# Patient Record
Sex: MALE | Race: WHITE
[De-identification: names, ages, dates, MRNs, and addresses within clinical notes are randomized per-mention and may not be internally consistent; named-entity substitution may affect disease eponyms.]

---

## 2019-04-18 ENCOUNTER — HOSPITAL ENCOUNTER (OUTPATIENT)
Dept: HOSPITAL 75 - LAB FS | Age: 69
End: 2019-04-18
Attending: PEDIATRICS
Payer: MEDICARE

## 2019-04-18 DIAGNOSIS — E87.5: Primary | ICD-10-CM

## 2019-04-18 LAB
ALBUMIN SERPL-MCNC: 4.2 GM/DL (ref 3.2–4.5)
ALP SERPL-CCNC: 49 U/L (ref 40–136)
ALT SERPL-CCNC: 59 U/L (ref 0–55)
BILIRUB SERPL-MCNC: 0.5 MG/DL (ref 0.1–1)
BUN/CREAT SERPL: 19
CALCIUM SERPL-MCNC: 9.2 MG/DL (ref 8.5–10.1)
CHLORIDE SERPL-SCNC: 102 MMOL/L (ref 98–107)
CO2 SERPL-SCNC: 27 MMOL/L (ref 21–32)
CREAT SERPL-MCNC: 1.11 MG/DL (ref 0.6–1.3)
GFR SERPLBLD BASED ON 1.73 SQ M-ARVRAT: > 60 ML/MIN
GLUCOSE SERPL-MCNC: 105 MG/DL (ref 70–105)
POTASSIUM SERPL-SCNC: 5 MMOL/L (ref 3.6–5)
PROT SERPL-MCNC: 7 GM/DL (ref 6.4–8.2)
SODIUM SERPL-SCNC: 141 MMOL/L (ref 135–145)

## 2019-04-18 PROCEDURE — 36415 COLL VENOUS BLD VENIPUNCTURE: CPT

## 2019-04-18 PROCEDURE — 80053 COMPREHEN METABOLIC PANEL: CPT

## 2021-11-17 ENCOUNTER — HOSPITAL ENCOUNTER (EMERGENCY)
Dept: HOSPITAL 75 - ER FS | Age: 71
Discharge: ANOTHER HOSPITAL | End: 2021-11-17
Payer: MEDICARE

## 2021-11-17 VITALS — DIASTOLIC BLOOD PRESSURE: 65 MMHG | SYSTOLIC BLOOD PRESSURE: 98 MMHG

## 2021-11-17 DIAGNOSIS — J44.9: ICD-10-CM

## 2021-11-17 DIAGNOSIS — I62.02: Primary | ICD-10-CM

## 2021-11-17 DIAGNOSIS — E87.2: ICD-10-CM

## 2021-11-17 DIAGNOSIS — R41.0: ICD-10-CM

## 2021-11-17 DIAGNOSIS — D64.9: ICD-10-CM

## 2021-11-17 DIAGNOSIS — R00.0: ICD-10-CM

## 2021-11-17 DIAGNOSIS — E86.0: ICD-10-CM

## 2021-11-17 DIAGNOSIS — N17.9: ICD-10-CM

## 2021-11-17 LAB
%HYPO/RBC NFR BLD AUTO: (no result) %
ALBUMIN SERPL-MCNC: 4 GM/DL (ref 3.2–4.5)
ALP SERPL-CCNC: 68 U/L (ref 40–136)
ALT SERPL-CCNC: 70 U/L (ref 0–55)
ANISOCYTOSIS BLD QL SMEAR: (no result)
APTT BLD: 29 SEC (ref 24–35)
APTT PPP: YELLOW S
ARTERIAL PATENCY WRIST A: (no result)
BACTERIA #/AREA URNS HPF: (no result) /HPF
BARBITURATES UR QL: NEGATIVE
BASE EXCESS STD BLDA CALC-SCNC: 2.6 MMOL/L (ref -2.5–2.5)
BASOPHILS # BLD AUTO: 0 10^3/UL (ref 0–0.1)
BASOPHILS NFR BLD AUTO: 0 % (ref 0–10)
BASOPHILS NFR BLD MANUAL: 1 %
BDY SITE: (no result)
BENZODIAZ UR QL SCN: NEGATIVE
BILIRUB SERPL-MCNC: 0.7 MG/DL (ref 0.1–1)
BILIRUB UR QL STRIP: NEGATIVE
BODY TEMPERATURE: 36.8
BUN/CREAT SERPL: 29
CALCIUM SERPL-MCNC: 9.3 MG/DL (ref 8.5–10.1)
CHLORIDE SERPL-SCNC: 107 MMOL/L (ref 98–107)
CO2 BLDA CALC-SCNC: 33.3 MMOL/L (ref 21–31)
CO2 SERPL-SCNC: 26 MMOL/L (ref 21–32)
COCAINE UR QL: NEGATIVE
CREAT SERPL-MCNC: 1.57 MG/DL (ref 0.6–1.3)
EOSINOPHIL # BLD AUTO: 0.1 10^3/UL (ref 0–0.3)
EOSINOPHIL NFR BLD AUTO: 1 % (ref 0–10)
EOSINOPHIL NFR BLD MANUAL: 0 %
FIBRINOGEN PPP-MCNC: CLEAR MG/DL
GFR SERPLBLD BASED ON 1.73 SQ M-ARVRAT: 44 ML/MIN
GLUCOSE SERPL-MCNC: 110 MG/DL (ref 70–105)
GLUCOSE UR STRIP-MCNC: NEGATIVE MG/DL
HCT VFR BLD CALC: 26 % (ref 40–54)
HGB BLD-MCNC: 6.7 G/DL (ref 13.3–17.7)
HYALINE CASTS #/AREA URNS LPF: (no result) /LPF
INHALED O2 FLOW RATE: (no result) L/MIN
INR PPP: 1.2 (ref 0.8–1.4)
KETONES UR QL STRIP: (no result)
LEUKOCYTE ESTERASE UR QL STRIP: NEGATIVE
LYMPHOCYTES # BLD AUTO: 0.5 X 10^3 (ref 1–4)
LYMPHOCYTES NFR BLD AUTO: 5 % (ref 12–44)
MANUAL DIFFERENTIAL PERFORMED BLD QL: YES
MCH RBC QN AUTO: 22 PG (ref 25–34)
MCHC RBC AUTO-ENTMCNC: 26 G/DL (ref 32–36)
MCV RBC AUTO: 85 FL (ref 80–99)
METHADONE UR QL SCN: NEGATIVE
METHAMPHETAMINE SCREEN URINE S: NEGATIVE
MONOCYTES # BLD AUTO: 1.1 X 10^3 (ref 0–1)
MONOCYTES NFR BLD AUTO: 11 % (ref 0–12)
MONOCYTES NFR BLD: 2 %
NEUTROPHILS # BLD AUTO: 8.1 X 10^3 (ref 1.8–7.8)
NEUTROPHILS NFR BLD AUTO: 82 % (ref 42–75)
NEUTS BAND NFR BLD MANUAL: 91 %
NEUTS BAND NFR BLD: 0 %
NITRITE UR QL STRIP: NEGATIVE
OPIATES UR QL SCN: NEGATIVE
OXYCODONE UR QL: NEGATIVE
PCO2 BLDA: 68 MMHG (ref 35–45)
PH BLDA: 7.27 [PH] (ref 7.37–7.43)
PH UR STRIP: 5.5 [PH] (ref 5–9)
PLATELET # BLD: 434 10^3/UL (ref 130–400)
PMV BLD AUTO: 11.9 FL (ref 9–12.2)
PO2 BLDA: 85 MMHG (ref 79–93)
POTASSIUM SERPL-SCNC: 4.8 MMOL/L (ref 3.6–5)
PROPOXYPH UR QL: NEGATIVE
PROT SERPL-MCNC: 7 GM/DL (ref 6.4–8.2)
PROT UR QL STRIP: (no result)
PROTHROMBIN TIME: 15.5 SEC (ref 12.2–14.7)
RBC #/AREA URNS HPF: (no result) /HPF
SAO2 % BLDA FROM PO2: 95 % (ref 94–100)
SODIUM SERPL-SCNC: 145 MMOL/L (ref 135–145)
SP GR UR STRIP: 1.02 (ref 1.02–1.02)
SQUAMOUS #/AREA URNS HPF: (no result) /HPF
TRICYCLICS UR QL SCN: NEGATIVE
VARIANT LYMPHS NFR BLD MANUAL: 6 %
VENTILATION MODE VENT: NO
WBC # BLD AUTO: 9.9 10^3/UL (ref 4.3–11)
WBC #/AREA URNS HPF: (no result) /HPF

## 2021-11-17 PROCEDURE — 85610 PROTHROMBIN TIME: CPT

## 2021-11-17 PROCEDURE — 86141 C-REACTIVE PROTEIN HS: CPT

## 2021-11-17 PROCEDURE — 83880 ASSAY OF NATRIURETIC PEPTIDE: CPT

## 2021-11-17 PROCEDURE — 83605 ASSAY OF LACTIC ACID: CPT

## 2021-11-17 PROCEDURE — 87040 BLOOD CULTURE FOR BACTERIA: CPT

## 2021-11-17 PROCEDURE — 80053 COMPREHEN METABOLIC PANEL: CPT

## 2021-11-17 PROCEDURE — 84484 ASSAY OF TROPONIN QUANT: CPT

## 2021-11-17 PROCEDURE — 93041 RHYTHM ECG TRACING: CPT

## 2021-11-17 PROCEDURE — 82805 BLOOD GASES W/O2 SATURATION: CPT

## 2021-11-17 PROCEDURE — 36415 COLL VENOUS BLD VENIPUNCTURE: CPT

## 2021-11-17 PROCEDURE — 80320 DRUG SCREEN QUANTALCOHOLS: CPT

## 2021-11-17 PROCEDURE — 71045 X-RAY EXAM CHEST 1 VIEW: CPT

## 2021-11-17 PROCEDURE — 70450 CT HEAD/BRAIN W/O DYE: CPT

## 2021-11-17 PROCEDURE — 80306 DRUG TEST PRSMV INSTRMNT: CPT

## 2021-11-17 PROCEDURE — 99291 CRITICAL CARE FIRST HOUR: CPT

## 2021-11-17 PROCEDURE — 85730 THROMBOPLASTIN TIME PARTIAL: CPT

## 2021-11-17 PROCEDURE — 93005 ELECTROCARDIOGRAM TRACING: CPT

## 2021-11-17 PROCEDURE — 82274 ASSAY TEST FOR BLOOD FECAL: CPT

## 2021-11-17 PROCEDURE — 85027 COMPLETE CBC AUTOMATED: CPT

## 2021-11-17 PROCEDURE — 81000 URINALYSIS NONAUTO W/SCOPE: CPT

## 2021-11-17 PROCEDURE — 85007 BL SMEAR W/DIFF WBC COUNT: CPT

## 2021-11-17 NOTE — DIAGNOSTIC IMAGING REPORT
INDICATION: Confusion, altered mental status, shortness of

breath.



COMPARISON: None.



FINDINGS:  shunt line is seen on the right. Lungs are clear.

The heart is normal. There is no pneumothorax. Osseous structures

are stable.



IMPRESSION: Negative chest.



Dictated by: 



  Dictated on workstation # [id number]

## 2021-11-17 NOTE — DIAGNOSTIC IMAGING REPORT
PROCEDURE: CT head without contrast.



TECHNIQUE: Multiple contiguous axial images were obtained through

the brain without the use of intravenous contrast. Auto Exposure

Controls were utilized during the CT exam to meet ALARA standards

for radiation dose reduction. 



INDICATION: Altered mental status, confusion.



COMPARISON: None available.



FINDINGS: A lead/catheter is identified extending into the right

frontal lobe through the right frontal bone. This is associated

with hypodensity along the course of this lead. This does not

extend into the ventricular system. An additional shunt catheter

is present entering via a posterior right parietal approach with

the distal tip extending to midline within the ventricular

system. A small amount of slightly hyperdense fluid is noted

within the extra-axial location overlying the left cerebral

hemisphere. This measures up to 4 mm in maximal thickness. There

is no significant midline shift. No uncal herniation. Focal

hypodensity is identified involving the posterior right frontal

and anterior right parietotemporal lobes adjacent to the sylvian

fissure. This appears to be associated with the cortex and

underlying white matter. Prominence of the ventricular system is

noted. Aneurysm coils are identified near the expected location

of the tip of the basilar artery. Scattered vascular

calcifications are present. The paranasal sinuses are clear.

Besides postsurgical changes, the calvarium and extracalvarial

soft tissues are unremarkable.



IMPRESSION: Minimal extra-axial slight hyperdensities overlying

the left cerebellar hemisphere concerning for a tiny

chronic-to-subacute subdural hematoma without mass effect.



Focal hypodensities adjacent to the right sylvian fissure. Though

indeterminate, this could relate to a recent infarction. Further

evaluation with MRI of the brain would help to further evaluate.



Ventricular shunt catheters are in place. Mild prominence of the

ventricular system is identified. Comparison to prior imaging

would be recommended to evaluate for change or developing

hydrocephalus.



Additional chronic and postsurgical changes as described above.



Findings discussed with Dr. Elizabeth at 1623 hours on November 17, 2021.



Dictated by: 



  Dictated on workstation # [id number]

## 2021-11-17 NOTE — ED GENERAL
General


Stated Complaint:  AMS


Source of Information:  Patient, EMS


Exam Limitations:  Other (slow to answer questions and somnolent)





History of Present Illness


Date Seen by Provider:  2021


Time Seen by Provider:  15:10


Initial Comments


71 year old male presenting by EMS with altered mental status. He has a history 

of COPD and is to be wearing oxygen but was found laying on the floor without 

any oxygen tubing on him.  There was no evidence of acute fall or injury.  He 

had been found by a neighbor when they went to check on him.  When EMS put him 

back on his oxygen transported the said that he did become more alert.  

According to his family he has had a previous brain aneurysm and had some mental

difficulties since then.  That was over 15 years ago when [facility] [place] was 

going by [facility].  He does live on his own and cares for himself but

his family does have DPOA.  He is answering questions as to his name and place 

as being in the hospital but is not oriented to time.  He cannot answer any 

questions about why he was here or what was going on for him today.  Family 

reports that last Thursday they had called 911 to try and get him transported to

be evaluated because he was having slurred speech and confusion and thought that

he was having a stroke at that time.  However when EMS arrived he had refused 

transport.


Associated Systoms:  No Chest Pain (denies chest pains), No Cough, No 

Fever/Chills, No Headaches (denies headache), No Nausea/Vomiting; Shortness of 

Air (chronic with COPD)





Allergies and Home Medications


Allergies


Coded Allergies:  


     No Known Drug Allergies (Unverified , 21)





Patient Home Medication List


Home Medication List Reviewed:  Yes





Review of Systems


Review of Systems


Constitutional:  No chills, No fever


Unable to obtain full ROS due to patient being somnolent and not answering 

questions.





Past Medical-Social-Family Hx


Past Medical History


Surgery/Hospitalization HX:  


Brain aneurysm treated at [place],  shunt, COPD that is oxygen dependent,


Hepatitis C





Physical Exam


Vital Signs





Vital Signs - First Documented








 21





 15:10 15:15


 


Temp 36.0 


 


Pulse 115 


 


Resp 23 


 


B/P (MAP) 126/67 (86) 


 


Pulse Ox 90 


 


O2 Delivery Nasal Cannula 


 


O2 Flow Rate  2.00





Capillary Refill :


Height, Weight, BMI


Height: '"


Weight: lbs. oz. kg;  BMI


Method:


General Appearance:  No Apparent Distress, Chronically ill, Other (somnolent and

slow to answer questions. oriented to person and place as hospital)


HEENT:  PERRL/EOMI; No Pharynx Normal (dry mucous membranes)


Neck:  Full Range of Motion, Supple


Respiratory:  Chest Non Tender, Lungs Clear, No Accessory Muscle Use, No 

Respiratory Distress, Decreased Breath Sounds; No Stridor


Cardiovascular:  Normal Peripheral Pulses, Tachycardia


Gastrointestinal:  Normal Bowel Sounds, No Pulsatile Mass, Non Tender, Soft


Rectal:  Heme Negative Stool


Extremity:  Normal Capillary Refill, Normal Range of Motion, Pedal Edema (1+ 

pitting edema BLE)


Neurologic/Psychiatric:  No Alert (somnolent but awakens to voice), No Oriented 

x3 (oriented to self and place); No Motor/Sensory Deficits, Other (moving all 

extremities without any evidence of focal deficit. he was not following commands

consistently enough to obtain a NIHSS.)


Skin:  Warm/Dry, Ecchymosis (several bruises in various stages of healing), 

Pallor





Focused Exam


Sepsis Stage:  Ruled Out


Reason for ruling out sepsis:  No source for infection


Lactate Level


21 15:30: Lactic Acid Level 4.12*H


21 18:40: Lactic Acid Level 1.41





Time of Focused Exam:  18:30


Respiratory:  Chest Non Tender, Lungs Clear, No Accessory Muscle Use, No 

Respiratory Distress, Decreased Breath Sounds


Cardiovascular:  Normal Peripheral Pulses


Capillary Refill:  Less Than 3 Seconds


Peripheral Pulses:  2+ Radial Pulses (R), 2+ Radial Pulses (L)


Skin:  warm/dry, pallor


Lactic Acid Level





Laboratory Tests








Test


 21


15:30 21


18:40


 


Lactic Acid Level


 4.12 MMOL/L


(0.50-2.00)  *H 1.41 MMOL/L


(0.50-2.00)








Within 3hrs of presentation:  Admin fluids, Blood cultures prior to ABX's, Focus

exam, Lactate level





Progress/Results/Core Measures


Suspected Sepsis


Recent Fever Within 48 Hours:  No


New/Unexplained  Altered Menta:  Yes


Within 3hrs of presentation:  Admin fluids, Blood cultures prior to ABX's, Focus

exam


SIRS


Temperature: 


Pulse:  


Respiratory Rate: 


 


Laboratory Tests


21 15:30: White Blood Count 9.9


Blood Pressure  / 


Mean: 


 





21 15:30: Lactic Acid Level 4.12*H


21 18:40: Lactic Acid Level 1.41


Laboratory Tests


21 15:30: 


Creatinine 1.57H, INR Comment 1.2, Platelet Count 434H, Total Bilirubin 0.7








Results/Orders


Lab Results





Laboratory Tests








Test


 21


15:30 21


15:42 21


18:40 21


19:20 Range/Units


 


 


White Blood Count


 9.9 


 


 


 


 4.3-11.0


10^3/uL


 


Red Blood Count


 3.06 L


 


 


 


 4.30-5.52


10^6/uL


 


Hemoglobin 6.7 *L    13.3-17.7  g/dL


 


Hematocrit 26 L    40-54  %


 


Mean Corpuscular Volume 85     80-99  fL


 


Mean Corpuscular Hemoglobin 22 L    25-34  pg


 


Mean Corpuscular Hemoglobin


Concent 26 L


 


 


 


 32-36  g/dL





 


Red Cell Distribution Width 21.2 H    10.0-14.5  %


 


Platelet Count


 434 H


 


 


 


 130-400


10^3/uL


 


Mean Platelet Volume 11.9     9.0-12.2  fL


 


Immature Granulocyte % (Auto) 1      %


 


Neutrophils (%) (Auto) 82 H    42-75  %


 


Lymphocytes (%) (Auto) 5 L    12-44  %


 


Monocytes (%) (Auto) 11     0-12  %


 


Eosinophils (%) (Auto) 1     0-10  %


 


Basophils (%) (Auto) 0     0-10  %


 


Neutrophils # (Auto) 8.1 H    1.8-7.8  X 10^3


 


Lymphocytes # (Auto) 0.5 L    1.0-4.0  X 10^3


 


Monocytes # (Auto) 1.1 H    0.0-1.0  X 10^3


 


Eosinophils # (Auto)


 0.1 


 


 


 


 0.0-0.3


10^3/uL


 


Basophils # (Auto)


 0.0 


 


 


 


 0.0-0.1


10^3/uL


 


Immature Granulocyte # (Auto)


 0.1 


 


 


 


 0.0-0.1


10^3/uL


 


Neutrophils % (Manual) 91      %


 


Lymphocytes % (Manual) 6      %


 


Monocytes % (Manual) 2      %


 


Eosinophils % (Manual) 0      %


 


Basophils % (Manual) 1      %


 


Band Neutrophils 0      %


 


Hypochromasia MODERATE      


 


Anisocytosis MODERATE      


 


Prothrombin Time 15.5 H    12.2-14.7  SEC


 


INR Comment 1.2     0.8-1.4  


 


Activated Partial


Thromboplast Time 29 


 


 


 


 24-35  SEC





 


Sodium Level 145     135-145  MMOL/L


 


Potassium Level 4.8     3.6-5.0  MMOL/L


 


Chloride Level 107       MMOL/L


 


Carbon Dioxide Level 26     21-32  MMOL/L


 


Anion Gap 12     5-14  MMOL/L


 


Blood Urea Nitrogen 46 H    7-18  MG/DL


 


Creatinine


 1.57 H


 


 


 


 0.60-1.30


MG/DL


 


Estimat Glomerular Filtration


Rate 44 


 


 


 


  





 


BUN/Creatinine Ratio 29      


 


Glucose Level 110 H      MG/DL


 


Lactic Acid Level


 4.12 *H


 


 1.41 


 


 0.50-2.00


MMOL/L


 


Calcium Level 9.3     8.5-10.1  MG/DL


 


Corrected Calcium 9.3     8.5-10.1  MG/DL


 


Total Bilirubin 0.7     0.1-1.0  MG/DL


 


Aspartate Amino Transf


(AST/SGOT) 117 H


 


 


 


 5-34  U/L





 


Alanine Aminotransferase


(ALT/SGPT) 70 H


 


 


 


 0-55  U/L





 


Alkaline Phosphatase 68       U/L


 


Troponin I < 0.30     <0.30  NG/ML


 


C-Reactive Protein < 0.30     <0.50  MG/DL


 


Pro-B-Type Natriuretic Peptide 3700.0 H    <75.0  PG/ML


 


Total Protein 7.0     6.4-8.2  GM/DL


 


Albumin 4.0     3.2-4.5  GM/DL


 


Serum Alcohol < 10     <10  MG/DL


 


Urine Color  YELLOW     


 


Urine Clarity  CLEAR     


 


Urine pH  5.5    5-9  


 


Urine Specific Gravity  1.025 H   1.016-1.022  


 


Urine Protein  TRACE H   NEGATIVE  


 


Urine Glucose (UA)  NEGATIVE    NEGATIVE  


 


Urine Ketones  TRACE H   NEGATIVE  


 


Urine Nitrite  NEGATIVE    NEGATIVE  


 


Urine Bilirubin  NEGATIVE    NEGATIVE  


 


Urine Urobilinogen  0.2    < = 1.0  MG/DL


 


Urine Leukocyte Esterase  NEGATIVE    NEGATIVE  


 


Urine RBC (Auto)  NEGATIVE    NEGATIVE  


 


Urine RBC  NONE     /HPF


 


Urine WBC  2-5     /HPF


 


Urine Squamous Epithelial


Cells 


 NONE 


 


 


  /HPF





 


Urine Crystals  NONE     /LPF


 


Urine Bacteria  TRACE     /HPF


 


Urine Casts  PRESENT     /LPF


 


Urine Hyaline Casts  2-5 H    /LPF


 


Urine Mucus  NEGATIVE     /LPF


 


Urine Culture Indicated  NO     


 


Urine Opiates Screen  NEGATIVE    NEGATIVE  


 


Urine Oxycodone Screen  NEGATIVE    NEGATIVE  


 


Urine Methadone Screen  NEGATIVE    NEGATIVE  


 


Urine Propoxyphene Screen  NEGATIVE    NEGATIVE  


 


Urine Barbiturates Screen  NEGATIVE    NEGATIVE  


 


Ur Tricyclic Antidepressants


Screen 


 NEGATIVE 


 


 


 NEGATIVE  





 


Urine Phencyclidine Screen  NEGATIVE    NEGATIVE  


 


Urine Amphetamines Screen  NEGATIVE    NEGATIVE  


 


Urine Methamphetamines Screen  NEGATIVE    NEGATIVE  


 


Urine Benzodiazepines Screen  NEGATIVE    NEGATIVE  


 


Urine Cocaine Screen  NEGATIVE    NEGATIVE  


 


Urine Cannabinoids Screen  NEGATIVE    NEGATIVE  


 


Blood Gas Puncture Site    LT RADIAL   


 


Blood Gas Patient Temperature    36.8   


 


Arterial Blood pH    7.27 *L 7.37-7.43  


 


Arterial Blood Partial


Pressure CO2 


 


 


 68 H


 35-45  MMHG





 


Arterial Blood Partial


Pressure O2 


 


 


 85 


 79-93  MMHG





 


Arterial Blood HCO3    31 H 23-27  MMOL/L


 


Arterial Blood Total CO2


 


 


 


 33.3 H


 21.0-31.0


MMOL/L


 


Arterial Blood Oxygen


Saturation 


 


 


 95 


   %





 


Arterial Blood Base Excess


 


 


 


 2.6 H


 -2.5-2.5


MMOL/L


 


Omkar Test    OK   


 


Blood Gas Ventilator Setting    NO   


 


Blood Gas Inspired Oxygen    2 LITERS   








My Orders





Orders - EDMUND FORD MD


Monitor-Rhythm Ecg Trace Only (21 15:21)


Ed Iv/Invasive Line Start (21 15:21)


Cbc With Automated Diff (21 15:21)


Comprehensive Metabolic Panel (21 15:21)


Crp Fs (21 15:21)


Troponin I Fs (21 15:21)


Protime With Inr (21 15:)


Partial Thromboplastin Time (21 15:21)


Ekg Tracing (21 15:21)


O2 (21 15:21)


Blood Culture (21 15:21)


Probnp Fs (21 15:21)


Lactic Acid Analyzer (21 15:21)


Drug Screen Stat (Urine) (21 15:21)


Alcohol (21 15:21)


Ua Culture If Indicated (21 15:21)


Chest 1 View Ap/Pa Only (21 15:21)


Ct Head Wo (21 15:21)


Manual Differential (21 15:30)


Fecal Occult Bedside (21 16:00)


Ns Iv 1000 Ml (Sodium Chloride 0.9%) (21 17:14)


Ns Iv 1000 Ml (Sodium Chloride 0.9%) (21 19:11)


Arterial Blood Gas (21 19:20)





Vital Signs/I&O











 11/17/21 11/17/21





 15:10 15:15


 


Temp 36.0 


 


Pulse 115 


 


Resp 23 


 


B/P (MAP) 126/67 (86) 


 


Pulse Ox 90 92


 


O2 Delivery Nasal Cannula Nasal Cannula


 


O2 Flow Rate  2.00





Capillary Refill :


Progress Note #1:  


Progress Note


With his decreased mental status will obtain CT head to look for stroke or 

bleeding. ECG to look for acute MI or arrhythmia. Labs to check for anemia, 

sepsis, electrolyte imbalance, renal failure, hepatic failure, heart failure, 

coagulopathy. Try to get ABG since pt has history of COPD and is not compliant 

with oxygen and treatment.


Progress Note #2:  


   Time:  16:00


Progress Note


Hemoglobin came back at 6.7 so Hemoccult at bedside was done.  The bedside 

Hemoccult was negative.  His chemistry was still pending.


Progress Note #3:  


   Time:  16:23


Progress Note


Call received by radiology about CT scan of the head showing subacute to chronic

subdural hematoma, with findings for hypodensity along the right sylvian 

fissure, that might indicate a recent infarct.  shunt in place with dilated 

ventricles but no old study for comparison so unable to state if ventricles were

more dilated than usual or not. Findings for previous clipping or coiling 

consistent with hx from family about 2006 aneurysm. 





Chemistry came back showing elevated lactic acid of 4.12 and elevated BUN of 46 

with creatinine of 1.57.  His other electrolytes were all stable.  He did have 

negative troponin of less than 0.3. Will give a Liter of NS to help with sinus 

tachycardia from his ECG and telemetry monitoring as well as dehydration and 

elevated Lactic acid. Castellano catheter was placed to obtain UA and monitor urine 

output. Despite multiple attempts pt was moving too much and ABG was not able to

be obtained. CXR clear of infiltrate or effusion. 





When updated patient and family, the family was requesting redo what ever was 

felt to be medically indicated to help him.  Patient was not making any comments

1 way or the other about admission or transfer.  Family was requesting patient 

try to go to [place] since he initially had his brain aneurysm treated in  at

[facility] which is now [facility].  They were also okay with going to [facility] which 

is also in [place].





 call placed to [facility] One call and reached LISETH Moon.  She stated that [facility] was at capacity and that [facility] was at capacity with 36 admit 

holds in the ED.  She stated that if we called back overnight to order in 24 

hours they may have a change in bed status but currently they were at capacity.





1737 call placed to [facility] in [place] and spoke with LISETH Plascencia.  He stated that 

they were on MedSurg diversion.  They did have me speak with Dr. Coronel from 

Neurosurgery in case she thought the patient could go to ICU type bed at 

[facility]. After discussing the case with her, she felt he would be better served 

to go to a facility that also had vascular surgery with his history of aneurysm 

and now having subdural hemorrhage and hypodensities in brain. 





1759 I spoke with LISETH Ingram at [facility].  He stated that all of the [facility] 

facilities in the [facility] area were at capacity for PCU and ICU beds.





1803 I spoke with LISETH Can at [facility] and gave her information 

about the patient.  She stated that she would go over things with the physician 

liaison and reach out if the patient could be accepted to .





1845 Pamella called back from  to state that they were at capacity and were 

unable to accept the patient.





185 I spoke with LISETH Dorantes, at [facility] and gave her some 

basic information on the patient.  She requested facesheet and the images to be 

clouded so they could be reviewed by Neurosurgery and transfer doctor. Will call

back once they have those things. 





 Call placed to [facility] for assistance with finding bed placement 

for the pateint. 





 [facility] called back and Dr. Felipe and Dr. Amador 

called back. After review of the case with them they accepted him to come to the

ED at [facility] on the [address] to be further evaluated and worked up for anemia, 

subdural hematoma, possible new infarct with hypodensities along sylvian 

fissure. 


Will have nurses call back to obtain report so patient may come to the ED.





Patient did have an ABG come back that showed mild acidosis with pH of 7.27 and 

pCO2 retention of 68 with pO2 of 85 and O2 sat of 95 on 2 lpm O2. His recheck of

Lactic acid after 1 L of NS did improve from 4.12 to 1.4. His heart rate came 

down from 120 to 106. As he never had a source for infection it was felt his 

elevated lactic acid was due to dehydration, hypoxia from non compliance with 

COPD treatment. Thus no antibiotics were given. 


He did have elevated proBNP of 3700 but no prior level for comparison and his 

CXR was clear without signs of infiltrate or failure.





ECG


Initial ECG Impression Date:  2021


Initial ECG Impression Time:  16:11


Initial ECG Rate:  115


Initial ECG Rhythm:  S.Tach


Initial ECG Comparisson:  No Previous ECG Available


Comment


Sinus tachycardia with a heart rate of 115 bpm.  Early repolarization changes in

diffuse leads with ST depression.  IN interval 138 ms.  QT interval 329 ms with 

a QTc interval 455 ms.  No acute ST elevation.  No prior tracing for comparison.

 There is baseline wander and artifact due to movement





Diagnostic Imaging





   Diagonstic Imaging:  Xray


   Plain Films/CT/US/NM/MRI:  chest


Comments


                 [facility] [place], [place]





NAME:   REBECCA BAR R


MED REC#:   [id number]


ACCOUNT#:   [id number]


PT STATUS:   REG ER


:   1950


PHYSICIAN:   EDMUND FORD MD


ADMIT DATE:   21/ER FS


                                  ***Signed***


Date of Exam:21





CHEST 1 VIEW AP/PA ONLY








INDICATION: Confusion, altered mental status, shortness of


breath.





COMPARISON: None.





FINDINGS:  shunt line is seen on the right. Lungs are clear.


The heart is normal. There is no pneumothorax. Osseous structures


are stable.





IMPRESSION: Negative chest.





Dictated by: 





  Dictated on workstation # [id number]








Dict:   218


Trans:   21


AS6 [id number]





Interpreted by:     JORDAN NASH


Electronically signed by: JORDAN NASH 21


   Reviewed:  Reviewed by Me








   Diagonstic Imaging:  CT


   Plain Films/CT/US/NM/MRI:  head


Comments


                 [facility] [place] [place]





NAME:   REBECCA BAR R


MED REC#:   [id number]


ACCOUNT#:   [id number]


PT STATUS:   REG ER


:   1950


PHYSICIAN:   EDMUND FORD MD


ADMIT DATE:   21/ER FS


                                  ***Signed***


Date of Exam:21





CT HEAD WO








PROCEDURE: CT head without contrast.





TECHNIQUE: Multiple contiguous axial images were obtained through


the brain without the use of intravenous contrast. Auto Exposure


Controls were utilized during the CT exam to meet ALARA standards


for radiation dose reduction. 





INDICATION: Altered mental status, confusion.





COMPARISON: None available.





FINDINGS: A lead/catheter is identified extending into the right


frontal lobe through the right frontal bone. This is associated


with hypodensity along the course of this lead. This does not


extend into the ventricular system. An additional shunt catheter


is present entering via a posterior right parietal approach with


the distal tip extending to midline within the ventricular


system. A small amount of slightly hyperdense fluid is noted


within the extra-axial location overlying the left cerebral


hemisphere. This measures up to 4 mm in maximal thickness. There


is no significant midline shift. No uncal herniation. Focal


hypodensity is identified involving the posterior right frontal


and anterior right parietotemporal lobes adjacent to the sylvian


fissure. This appears to be associated with the cortex and


underlying white matter. Prominence of the ventricular system is


noted. Aneurysm coils are identified near the expected location


of the tip of the basilar artery. Scattered vascular


calcifications are present. The paranasal sinuses are clear.


Besides postsurgical changes, the calvarium and extracalvarial


soft tissues are unremarkable.





IMPRESSION: Minimal extra-axial slight hyperdensities overlying


the left cerebellar hemisphere concerning for a tiny


chronic-to-subacute subdural hematoma without mass effect.





Focal hypodensities adjacent to the right sylvian fissure. Though


indeterminate, this could relate to a recent infarction. Further


evaluation with MRI of the brain would help to further evaluate.





Ventricular shunt catheters are in place. Mild prominence of the


ventricular system is identified. Comparison to prior imaging


would be recommended to evaluate for change or developing


hydrocephalus.





Additional chronic and postsurgical changes as described above.





Findings discussed with Dr. Ford at 1623 hours on 2021.





Dictated by: 





  Dictated on workstation # [id number]








Dict:   21


Trans:   21


AS6 [id number]





Interpreted by:     JOSE MANUEL BELL MD


Electronically signed by: JOSE MANUEL BELL MD 21


   Reviewed:  Reviewed by Me





Critical Care Note


Critical Care


Total Time (minutes)


60 minutes


Progress


60 minutes of critical care time was spent in direct care of the patient.  This 

time excludes separately billable procedures.  Time was spent in obtaining 

history from patient, family, electronic medical record, ordering test and 

reviewing results, ordering interventions and reviewing response, discussion 

with consultants, documentation in the chart.  Patient was at risk of neurologic

compromise, cardiovascular compromise. He required my direct attention and care 

to manage his medical condition and then spent over 2.5 hours working on 

arranging transfer of patient.





Departure


Impression





   Primary Impression:  


   Subacute subdural hematoma


   Additional Impressions:  


   Anemia


   Qualified Codes:  D64.9 - Anemia, unspecified


   Acute kidney injury


   Dehydration


   COPD (chronic obstructive pulmonary disease)


   Qualified Codes:  J44.9 - Chronic obstructive pulmonary disease, unspecified


   Subacute confusional state of cerebrovascular origin


   Carbon dioxide retention


Disposition:   XFER SHT-TRM HOSP


Condition:  Stable





Transfer


Transfer Reason:  Exceeds level of care 

(Neurosurgery/Neurology/Hematology/Pulmonology)


Time Spoke to Accepting Phy:  19:54


Transfer Progress Notes


Discussed with Dr. Felipe and Dr. Amador at [facility]. 

They had reviewed his images and I reviewed information about the patient. They 

agreed he needed additional work up and evaluation to determine better what was 

going on for him with his anemia, subdural hematoma, possible new infarct, 

chronic hypoxia with CO2 retention and COPD.


Transfer Facility:  


[facility]


Method of Transfer:  EMS





Departure-Patient Inst.


Referrals:  


SAM SANTIZO MD (PCP/Family)


Primary Care Physician











EDMUND FORD MD               2021 15:20

## 2021-11-22 NOTE — XMS REPORT
Encounter Summary

                             Created on: 2021



Catrachito Angel

External Reference #: [id number]

: 1950

Sex: Male



Demographics





                          Address                   [address] [place]  [address]

 

                          Home Phone                [phone]

 

                          Preferred Language        English

 

                          Marital Status            Single

 

                          Hinduism Affiliation     Unknown

 

                          Race                      White

 

                          Ethnic Group              Not  or 





Author





                          Author                    [facility]

 

                          Organization              [facility]

 

                          Address                   Unknown

 

                          Phone                     Unavailable







Support





                Name            Relationship    Address         Phone

 

                Steven Yanez ECON            Unknown         [phone]







Care Team Providers





                    Care Team Member Name Role                Phone

 

                    Henrik Allison MD        PCP                 [phone]







Reason for Visit

* 



 



                           Reason                    Comments

 

 



                           Altered Mental Status     pt transfer from [facility]

i. Found on floor today  by 

neighbor. Dx with



                                         SDH and subacute stroke. pt is A&Ox1





* Auth/Cert



                    Diagnoses / Procedures Referred By Contact Referred To Conta

ct



                                         Specialty   

 

                                        



Diagnoses



Subdural hemorrhage (HCC)



Dehydration



Altered mental status, unspecified altered mental status type















SDH, altered mental status



Fall

                                        



                                        











      



           Referral ID  Status    Reason    Start Date  Expiration  Visits    Vi

sits



                     Date                Requested           Authorized

 

      



                     [id number]             1                   1











Encounter Details





                          Care Team                 Description



                     Date                Type                Department  

 

                                        



Sarah Lugo MD



[address] [place], [place] [id number] [phone] (Work)



[phone] (Fax)





Francisco Herrmann MD



[address] [facility] [address]



[place], [place] [id number] [phone] (Work)



[phone] (Fax)





Alysha Tsai MD



[address] [place] [address] [phone] (Work)



[phone] (Fax)                      Altered mental status, unspecified alter

ed mental status type

(Primary Dx); 

Subdural hemorrhage (HCC); 

Dehydration



                     2021          [facility]  



                           Encounter                 [address] [place], [place] [id number] [phone]  







Social History





                                        Date



                 Tobacco Use     Types           Packs/Day       Years Used 

 

                                         



                                         Never Assessed    







 



                           Sex Assigned at Birth     Date Recorded

 

 



                                         Not on file 



documented as of this encounter



Last Filed Vital Signs





                    Reading             Time Taken          Comments



                                         Vital Sign   

 

                    160/71              2021  9:00 AM CST  



                                         Blood Pressure   

 

                    120                 2021  9:00 AM CST  



                                         Pulse   

 

                    37 C (98.6 F)   2021  8:00 AM CST  



                                         Temperature   

 

                    27                  2021  9:00 AM CST  



                                         Respiratory Rate   

 

                    98%                 2021  9:00 AM CST  



                                         Oxygen Saturation   

 

                    -                   -                    



                                         Inhaled Oxygen   



                                         Concentration   

 

                    68.4 kg (150 lb 12.7 oz) 2021  5:27 AM CST  



                                         Weight   

 

                    167.6 cm (5' 6")    2021 11:48 PM CST  



                                         Height   

 

                    24.34               2021 11:48 PM CST  



                                         Body Mass Index   



documented in this encounter



Progress Notes

* Kristen Cowart MD - 2021  1:52 PM CST



Formatting of this note is different from the original.



[facility] 



INFECTIOUS DISEASE PROGRESS NOTE 



REASON FOR SEEING PATIENT: 



Suspected meningitis



SUBJECTIVE: 



Mental status is about the same.  No change in mental status



OBJECTIVE: 



No fever, white blood cell count 10.85



MEDICATIONS: 



Scheduled Medications:

 acyclovir  10 mg/kg (Ideal) Intravenous Q12H MARQUISE 

 ampicillin  2 g Intravenous Q6H MARQUISE 

 bisacodyL  10 mg Rectal Daily 

 cefepime  2 g Intravenous Q12H MARQUISE 

 famotidine  20 mg Oral Q12H 

 Or 

 famotidine  20 mg Intravenous Q12H 

 guanFACINE  2 mg Per NG tube BID 

 haloperidol lactate  5 mg Intravenous Once 

 insulin lispro  2-7 Units Subcutaneous Q6H MARQUISE 

 ipratropium-albuteroL  3 mL Inhalation 4x daily 

 therapeutic multivitamin  1 tablet Oral Daily 

 polyethylene glycol  17 g Oral Daily 

 senna-docusate  2 tablet Oral BID 

 thiamine  250 mg Intravenous TID 

 vancomycin  750 mg Intravenous Q12H 



Continuous Infusions:



PRN Medications:

alteplase, dextrose 50%, flumazeniL, glucagon **OR** glucagon, glucose, hydrALAZ
INE, lipase-protease-amylase **AND** sodium bicarbonate, LORazepam **OR** LORaze
luis, magnesium hydroxide, ondansetron, prochlorperazine **OR** prochlorperazine 
**OR** prochlorperazine



LABORATORY RESULTS: 



Last CBC:

Most Recent Result within the last 7 days 

Lab Units 21

2350 21

0627 21

0029 21

1223 21

0028 21

1226 21

0102 21

2249 21 

WBC TH/uL 10.85  --  11.36*  --  12.75*  --  12.78*  --  13.22* 

HEMOGLOBIN g/dL 8.0* 8.0* 6.8* 7.0* 7.3* 7.8* 6.0*   < > 6.1* 

HEMATOCRIT % 29* 28* 25* 26* 27* 28* 23*   < > 23* 

PLATELET COUNT Th/uL 169  --  202  --  238  --  322  --  330 

 < > = values in this interval not displayed. 



Last BMP:

Most Recent Result within the last 7 days 

Lab Units 21

0827 21

2350 21

1945 21

0803 21

0029 21

2143 21

2325 21 

SODIUM mEq/L 153* 153* 153* 150* 152* 149* 147*   < > 147* 

POTASSIUM mEq/L  --  4.4  --   --  4.3 4.6 4.6  --  5.0 

CARBON DIOXIDE mEq/L  --  29  --   --  28 28 29  --  28 

BLOOD UREA NITROGEN mg/dL  --  46*  --   --  46* 46* 44*  --  43* 

CREATININE mg/dL  --  1.20  --   --  1.20 1.20 1.40*  --  1.50* 

CALCIUM mg/dL  --  8.3  --   --  8.0* 9.6 8.8  --  8.9 

 < > = values in this interval not displayed. 



Lab Results 

Component Value Date 

 PROCALCIT 0.09 (H) 2021 



CULTURES: 



Results for orders placed or performed during the hospital encounter of 21


Extra Urine Specimen in Grey Tube 

 Collection Time: 21 11:16 PM 

 Specimen: Urine Clean Catch 

     -  

 RAINBOW DRAW HOLD SPECIMENS [place] Draw/Extra Tube Hold Specimen  



CULTURE SUMMARY: 



Not available



SEROLOGIES: 



SARS-CoV-2 PCR negative



IMAGING 



MRI brain pending



PHYSICAL EXAMINATION: 



Vitals: 

 21 1315 

BP: (!) 147/115 

Pulse: (!) 123 

Resp: 22 

Temp:  

TempSrc:  

SpO2: 94% 

Weight:  

Height:  



Temp (24hrs), Av.5 C (97.7 F), Min:36.1 C (96.9 F), Max:36.8 C (98
.2 F)



General:  Does not follow command

Head: Normocephalic, atraumatic 

Neck: no adenopathy

Chest: clear to auscultation bilaterally 

CV: regular rhythm without murmur, gallop or rub

Abdomen: normal bowel sounds, soft, nontender



PROBLEM LIST: 



Principal Problem:

  Encephalopathy

Active Problems:

  UTI (urinary tract infection)

  SDH (subdural hematoma) (HCC)

  History of cerebral aneurysm

  S/P  shunt

  COPD (chronic obstructive pulmonary disease) (HCC)

  Bronchitis

  Elevated INR

  GREGORY (acute kidney injury) (HCC)

  Hypernatremia

  Other specified anemias

  Elevated troponin

  Acute pain due to trauma

  Coagulopathy (HCC)

  Acute encephalopathy

  Chronic respiratory failure with hypoxia (HCC)

  COPD with acute exacerbation (HCC)

  Leukocytosis

  Anemia

  Transaminitis

  Hx of hepatitis C



 LOS: 4 days 



IMPRESSION: 



1. Altered mental status, possible meningitis encephalitis, lumbar puncture have
been unsuccessful.

2. Chronic hypoxic respiratory failure

3. History of cerebral aneurysm s/p coiling of left occipital

4. Urinary tract infection



RECOMMENDATIONS 



 Follow-up on urine culture

 Follow-up on MRI results

 Noted that family found several empty bottles of whiskey and empty hydrocodon
e bottles around his home.  Patient is started on CIWA protocol.

 Continue current antimicrobial, based on the MRI findings will decide about h
is antimicrobial regimen.



 Electronically signed by Kristen Cowart MD 2021 1:52 PM

--------------







Electronically signed by Kristen Cowart MD at 2021  1:53 PM CST

* Valerie Esqueda APRN - 2021  8:43 AM CST



Formatting of this note is different from the original.

NEUROLOGY PROGRESS NOTE 



Patient: Catrachito Angel

: 1950

MRN: [id number]

PCP: Henrik Allison MD

LOS: 4



CHIEF COMPLAINT

Encephalopathy 



INTERVAL HISTORY

History is provided via: nurse 



No acute events overnight. Does not participate in exam this morning, does not f
ollow commands or answer questions. Remains on precedex for agitation and receiv
ed Ativan per CIWA protocol. MRI brain pending; awaiting records regarding  sh
unt to determine if MRI compatible.



REVIEW OF SYSTEMS

Unable to obtain ROS; does not answer questions 



MEDICATIONS

 dexmedeTOMIDINE 0.8 mcg/kg/hr (21 0251) 



 acyclovir  10 mg/kg (Ideal) Intravenous Q12H MARQUISE 

 ampicillin  2 g Intravenous Q6H MARQUISE 

 bisacodyL  10 mg Rectal Daily 

 cefepime  2 g Intravenous Q12H MARQUISE 

 famotidine  20 mg Oral Q12H 

 Or 

 famotidine  20 mg Intravenous Q12H 

 guanFACINE  2 mg Per NG tube BID 

 haloperidol lactate  5 mg Intravenous Once 

 insulin lispro  2-7 Units Subcutaneous Q6H MARQUISE 

 ipratropium-albuteroL  3 mL Inhalation 4x daily 

 therapeutic multivitamin  1 tablet Oral Daily 

 polyethylene glycol  17 g Oral Daily 

 senna-docusate  2 tablet Oral BID 

 thiamine  500 mg Intravenous TID 

 Followed by 

 thiamine  250 mg Intravenous TID 

 vancomycin  750 mg Intravenous Q12H 



alteplase, dextrose 50%, flumazeniL, glucagon **OR** glucagon, glucose, hydrALAZ
INE, lipase-protease-amylase **AND** sodium bicarbonate, LORazepam **OR** LORaze
luis, magnesium hydroxide, ondansetron, prochlorperazine **OR** prochlorperazine 
**OR** prochlorperazine



PHYSICAL EXAMINATION

Patient Vitals for the past 4 hrs:

 BP Temp Temp src Pulse Resp SpO2 Weight 

21 0823    92 28 97 %  

21 0800 136/65 36.6 C (97.9 F) Axillary 80 20 99 %  

21 0700 (!) 140/69   76 20 98 %  

21 0600 126/84   83 18 97 % 88 kg (194 lb 0.1 oz) 

21 0500 122/60   75 18 95 %  



Systolic (24hrs), Av , Min:112 , Max:153 



Diastolic (24hrs), Av, Min:52, Max:91



Ht Readings from Last 1 Encounters: 

21 1.676 m (5' 6") 



Wt Readings from Last 1 Encounters: 

21 88 kg (194 lb 0.1 oz) 



Body mass index is 31.31 kg/m.



General:

 Mr. Angel is a thin ill appearing male in no acute distress. 

 Head:  Oropharynx dry and cracked.  Head normocephalic, atraumatic. 

 Cardiac:  Regular rate and rhythm.  

 Lungs: Wheezing lung sounds bilaterally 

 Abdomen: soft, non-tender with + BS. NG tube in place 

 Extremities:  Edema in bilateral upper and lower extremities. Scattered bruis
ing in bilateral upper extremities  



Mental status, cognition, and cortical functions:

 Mental status: Sleeping; minimally responsive to voice and painful stimuli. D
oes not follow commands or answer questions.



Cranial nerves:

 Pupils are equal, round, and reactive to light. 

 Does not open eyes at time of exam to track 

 Gaze is conjugate with eyes held open 

 Muscles of facial expression are symmetric at rest 



Motor:

 Muscle bulk is thin throughout. 

 Muscle tone is normal throughout. 

 Spontaneous movement in all extremities 



Reflexes (right/left):

 Biceps:  2/2 

 Brachioradialis:  2/2 

 Patellae:  2/2

 Plantar:  Mute bilaterally. 

 No clonus 



Sensation:

 Withdrawals to painful stimuli in all extremities



STUDIES REVIEWED

Neurology specific images personally reviewed



CT Head wo contrast



Result Date: 2021

Impression: Patient motion artifact and suboptimal positioning degrades image qu
ality. 1. Mixed attenuation left holohemispheric likely subacute on chronic subd
ural hematoma which measures up to 6 mm in maximal thickness. No significant mas
s effect or discrete midline shift given suboptimal patient positioning. Similar
ventriculomegaly when compared to outside CT. 2. Gray-white loss is identified 
within the right frontotemporal lobe likely representing a subacute or chronic r
ight MCA territory infarct. 3. Right parietal approach ventricular shunt cathete
r is identified with the distal intracranial and terminating near the foramen of
Cook. The partially visualized ventricular shunt catheter tubing and port alonzo
ear intact where visualized. 4. Postprocedural changes of prior aneurysm coiling
are identified possibly involving a basilar tip aneurysm. ATTESTATION STATEMENT:
The staff radiologist has personally reviewed the images and dictated, reviewed,
or edited the final report. READING SITE: [facility]. 



CT Head wo contrast



Result Date: 2021

Impression: 1. Stable mixed attenuation left holohemispheric extra-axial collect
ion likely representing subacute on chronic subdural hematoma measures up to 5 m
m in maximal thickness. No new or enlarging intracranial hemorrhage. No signific
ant mass effect or discrete midline shift. Similar ventriculomegaly. 2. Stable g
ray-white loss involving the right frontotemporal lobes and right insula likely 
representing a subacute right MCA territory infarct. 3. Stable right parietal ap
proach ventricular shunt catheter. 4. Redemonstrated postprocedural changes of p
rior aneurysm coiling are identified possibly involving a basilar tip aneurysm o
r PCOM aneurysm given location. ATTESTATION STATEMENT: The Staff Radiologist has
personally reviewed the images and dictated, reviewed, or edited the final repo
rt.



CT Lumbar Spine reconstructed



Result Date: 2021

1.  No acute osseous abnormality of the lumbar spine. 2.  Mild to moderate multi
level lumbar spondylosis. READING SITE: [facility]. ATTESTATION STATEMENT
: The Staff Radiologist has personally reviewed the images and dictated, reviewe
d, or edited the final report.



CT Thoracic Spine reconstructed



Result Date: 2021

Impression: Mild to moderate multilevel degenerative thoracic spondylosis. READI
NG SITE: [facility]. ATTESTATION STATEMENT: The Staff Radiologist has per
sonally reviewed the images and dictated, reviewed, or edited the final report. 



IR Lumbar puncture w fluoro



Result Date: 2021

Unable to collect spinal fluid at multiple levels. The spinal needle tip was con
firmed within the thecal sac on AP and lateral on multiple levels. ATTESTATION S
TATEMENT: The Staff Physician has personally reviewed the images and dictated, r
eviewed, or edited the final report. READING SITE: [facility]
[place]



Echo Complete with Doppler and Color Flow



Result Date: 2021

  1. Normal left ventricular systolic function, with an estimated ejection fract
ion of 60%. 

  2. Normal right ventricular size and systolic function. 

  3. No significant valvular abnormalities. 

  No previous study available for comparison. 



  Dr Johnny Benavidez

  (Electronically Signed)

  Final Date:    2021 12:38



LABS REVIEWED

Most Recent Result within the last 7 days 

Lab Units 21

2350 21

0627 21

0029 21

0640 21

0028 21

1226 21

1200 

WBC TH/uL 10.85   < > 11.36*  --  12.75*  --   --  

HEMOGLOBIN g/dL 8.0*   < > 6.8*   < > 7.3*   < >  --  

HEMATOCRIT % 29*   < > 25*   < > 27*   < >  --  

PLATELET COUNT Th/uL 169   < > 202  --  238  --   --  

% SEGMENTED NEUTROPHILS %  --   --   --   --  87*  --   --  

% LYMPHOCYTES %  --   --   --   --  3*  --   --  

% MONOCYTES %  --   --   --   --  9  --   --  

% EOSINOPHILS %  --   --   --   --  0  --   --  

% BASOPHILS %  --   --   --   --  0  --   --  

SODIUM mEq/L 153*   < > 152*  --   --    < >  --  

POTASSIUM mEq/L 4.4   < > 4.3  --   --    < >  --  

CARBON DIOXIDE mEq/L 29   < > 28  --   --    < >  --  

BLOOD UREA NITROGEN mg/dL 46*   < > 46*  --   --    < >  --  

GLUCOSE mg/dL 173*   < > 151*  --   --    < >  --  

CREATININE mg/dL 1.20   < > 1.20  --   --    < >  --  

GFR FEMALE AA mL/min/1.73m*2 53.4*   < > 53.4*  --   --    < >  --  

GFR FEMALE NON-AA mL/min/1.73m*2 44.0*   < > 44.0*  --   --    < >  --  

CALCIUM mg/dL 8.3   < > 8.0*  --   --    < >  --  

PHOSPHORUS mg/dL 3.3  --   --   --   --    < >  --  

ALBUMIN g/dL 3.2*   < > 3.2*  --   --   --   --  

PROTEIN TOTAL SERUM g/dL  --   --  5.2*  --   --   --   --  

ALKALINE PHOSPHATASE U/L  --   --  50  --   --   --   --  

ALANINE AMINOTRANSFERASE U/L  --   --  114*  --   --   --   --  

ASPARTATE AMINOTRANSFERASE U/L  --   --  98*  --   --   --   --  

APTT Sec  --   --   --   --   --   --  30 

INR   --   --  1.3*   < >  --   --  1.1 

 < > = values in this interval not displayed. 



Most Recent Result within the last 7 days 

Lab Units 21

1238 21

1035 

VITAMIN B12 pg/mL  --  1,218* 

THYROID STIMULATING HORMONE uIU/mL 5.53*  --  



IMPRESSION

 Encephalopathy; with decline over the past few months and significant change
in mental status over the past week. Etiology unclear; metabolic derangements, 
medication effects, possible alcohol withdrawal are likely contributing factor. 
Due to slow decline over past few months question if he has another underlying c
ause-unable to obtain CSF on lumbar puncture  

 Anemia (?) cause

 Chronic subdural hematoma 

 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
balbina s/p  shunt

 Suspected alcohol abuse

 Anemia

 Coagulopathy

 COPD exacerbation

 Acute kidney injury



 

RECOMMENDATIONS

 MRI head w/wo contrast pending- awaiting information on  shunt 

 Consider further workup of anemia/coagulopathy  

 Labs: serum paraneoplastic panel, TPO and thyroglobulin antibody 

 Continue Thiamine replacement 

 CIWA protocol based on family reported alcohol abuse 

 Neuro checks per unit routine 

 Supportive care and ongoing management of metabolic derangements 

 Neurology will continue to follow 



GIA Olivo  2021 8:43 AM  Can contact me through [employer] with
any questions or concerns 



PPE Statement: GIA Olivo used Yellow precautions (Level 1 mask wo
rn over level 3 mask, eye protection, and gloves).



Principal Problem:

  Encephalopathy

Active Problems:

  UTI (urinary tract infection)

  SDH (subdural hematoma) (HCC)

  History of cerebral aneurysm

  S/P  shunt

  COPD (chronic obstructive pulmonary disease) (HCC)

  Bronchitis

  Elevated INR

  GREGORY (acute kidney injury) (HCC)

  Hypernatremia

  Other specified anemias

  Elevated troponin

  Acute pain due to trauma

  Coagulopathy (HCC)

  Acute encephalopathy

  Chronic respiratory failure with hypoxia (HCC)

  COPD with acute exacerbation (HCC)

  Leukocytosis

  Anemia

  Transaminitis

  Hx of hepatitis C







Electronically signed by GIA Olivo at 2021  9:24 AM CST

* Jocy Matt, PharmD - 2021  1:59 PM CST



Formatting of this note is different from the original.

Follow-up Pharmacokinetic Consult: Anti-Infective Dosing



Assessment/Plan

Pharmacy has been consulted on Catrachito Angel to dose vancomycin for meningi
tis.



Based on a vancomycin trough of 20 mcg/mL, will decrease dose to vancomycin 750 
mg  IV every 12 hours as further accumulation expected. Plan to target vancomyci
n trough of 15-20 mcg/mL.



Pharmacy will continue to follow the patients culture results and clinical pr
ogress daily. 



Relevant clinical data and objective history reviewed:

Creatinine 

Date Value Ref Range Status 

2021 1.20 0.70 - 1.30 mg/dL Final 

2021 1.20 0.70 - 1.30 mg/dL Final 

2021 1.40 (H) 0.70 - 1.30 mg/dL Final 



Dialysis Modality Requirements: None; Estimated Creatinine Clearance: 54.7 mL/mi
n (based on SCr of 1.2 mg/dL).



I/O last 3 completed shifts:

In: 7580.2 [I.V.:4274.2; Blood:443; NG/GT:494; IV Piggyback:2369.1]

Out: 1145 [Urine:1145]

Lab Results 

Component Value Date/Time 

 WBC 11.36 (H) 2021 12:29 AM 



Lab Results 

Component Value Date/Time 

 PROCALCIT 0.09 (H) 2021 01:02 AM 



Temp Readings from Last 3 Encounters: 

21 36.4 C (97.6 F) (Axillary) 



Culture: No results found for this visit on 21. 



Carley HackettD



Electronically signed by Jocy Matt PharmD at 2021  2:00 PM CST

* Kristen Cowart MD - 2021 10:50 AM CST



Formatting of this note is different from the original.



[facility] 



INFECTIOUS DISEASE PROGRESS NOTE 



REASON FOR SEEING PATIENT: 



Suspected meningitis



SUBJECTIVE: 



Mental status is about the same.



OBJECTIVE: 



No fever, white blood cell count 11.36



MEDICATIONS: 



Scheduled Medications:

 acyclovir  10 mg/kg (Ideal) Intravenous Q12H MARQUISE 

 ampicillin  2 g Intravenous Q6H MARQUISE 

 bisacodyL  10 mg Rectal Daily 

 cefepime  2 g Intravenous Q12H MARQUISE 

 famotidine  20 mg Oral Q12H 

 Or 

 famotidine  20 mg Intravenous Q12H 

 guanFACINE  2 mg Per NG tube BID 

 haloperidol lactate  5 mg Intravenous Once 

 insulin lispro  2-7 Units Subcutaneous Q6H MARQUISE 

 ipratropium-albuteroL  3 mL Inhalation 4x daily 

 senna-docusate  2 tablet Oral BID 

 [START ON 2021] thiamine  100 mg Intravenous Daily 

 Or 

 [START ON 2021] thiamine  100 mg Oral Daily 

 vancomycin  1,000 mg Intravenous Q12H 



Continuous Infusions:

 dexmedeTOMIDINE Stopped (21 1018) 

 lactated Ringers 100 mL/hr (21 0722) 



PRN Medications:

alteplase, dextrose 50%, flumazeniL, glucagon **OR** glucagon, glucose, hydrALAZ
INE, lipase-protease-amylase **AND** sodium bicarbonate, LORazepam **OR** LORaze
luis, ondansetron, prochlorperazine **OR** prochlorperazine **OR** prochlorperazi
ne, sodium chloride 0.9%



LABORATORY RESULTS: 



Last CBC:

Most Recent Result within the last 7 days 

Lab Units 21

0627 21

0029 21

1223 21

0028 21

1226 21

0102 21

2249 

WBC TH/uL  --  11.36*  --  12.75*  --  12.78* 13.22* 

HEMOGLOBIN g/dL 8.0* 6.8* 7.0* 7.3* 7.8* 6.0* 6.1* 

HEMATOCRIT % 28* 25* 26* 27* 28* 23* 23* 

PLATELET COUNT Th/uL  --  202  --  238  --  322 330 



Last BMP:

Most Recent Result within the last 7 days 

Lab Units 21

0803 21

0029 21

2143 21

2325 21

2249 

SODIUM mEq/L 150* 152* 149* 147* 147* 

POTASSIUM mEq/L  --  4.3 4.6 4.6 5.0 

CARBON DIOXIDE mEq/L  --  28 28 29 28 

BLOOD UREA NITROGEN mg/dL  --  46* 46* 44* 43* 

CREATININE mg/dL  --  1.20 1.20 1.40* 1.50* 

CALCIUM mg/dL  --  8.0* 9.6 8.8 8.9 



Lab Results 

Component Value Date 

 PROCALCIT 0.09 (H) 2021 



CULTURES: 



No results found for this visit on 21.



CULTURE SUMMARY: 



Not available



SEROLOGIES: 



SARS-CoV-2 PCR negative



IMAGING 



MRI brain pending



PHYSICAL EXAMINATION: 



Vitals: 

 21 0900 

BP: 135/52 

Pulse: 87 

Resp: (!) 31 

Temp:  

TempSrc:  

SpO2: 96% 

Weight:  

Height:  



Temp (24hrs), Av.5 C (97.7 F), Min:36.1 C (97 F), Max:37.2 C (99 
F)



General:  Does not follow command

Head: Normocephalic, atraumatic 

Neck: no adenopathy

Chest: clear to auscultation bilaterally 

CV: regular rhythm without murmur, gallop or rub

Abdomen: normal bowel sounds, soft, nontender



PROBLEM LIST: 



Principal Problem:

  Encephalopathy

Active Problems:

  UTI (urinary tract infection)

  SDH (subdural hematoma) (HCC)

  History of cerebral aneurysm

  S/P  shunt

  COPD (chronic obstructive pulmonary disease) (HCC)

  Bronchitis

  Elevated INR

  GREGORY (acute kidney injury) (HCC)

  Hypernatremia

  Other specified anemias

  Elevated troponin

  Acute pain due to trauma

  Coagulopathy (HCC)

  Acute encephalopathy

  Chronic respiratory failure with hypoxia (HCC)

  COPD with acute exacerbation (HCC)

  Leukocytosis

  Anemia

  Transaminitis

  Hx of hepatitis C



 LOS: 3 days 



IMPRESSION: 



1. Altered mental status, possible meningitis encephalitis, lumbar puncture have
been unsuccessful.

2. Chronic hypoxic respiratory failure

3. History of cerebral aneurysm s/p coiling of left occipital

4. Urinary tract infection



RECOMMENDATIONS 



 Follow-up on urine culture

 Follow-up on MRI results

 Noted that family found several empty bottles of whiskey and empty hydrocodon
e bottles around his home.  Patient is started on CIWA protocol.

 Continue current antimicrobial, based on the MRI findings will decide about h
is antimicrobial regimen.



 Electronically signed by Kristen Cowart MD 2021 10:50 AM

--------------







Electronically signed by Kristen Cowart MD at 2021 10:54 AM CST

* CHANO Grimm - 2021  5:53 AM CST



Formatting of this note is different from the original.

  Critical Care Progress Note  



PATIENT NAME: Catrachito Angel

DATE of SERVICE: 2021 

CPI: [id number]

AGE: 71 y.o.

: 1950



CHIEF COMPLAINT: Acute encephalopathy, concern for meninginitis/encephalitis on 
broad spectrum therapies 



DATE OF PROCEDURES: none



HOSPITAL COURSE: Due to patient arriving with altered mental status, primary med
ical information obtained from medical records/family report at bedside. Mr. Darcie paredes is a 70 yo male with a PMHx of COPD requiring baseline O2 supplement, H
x of cerebral aneurysm s/p coil, chronic hepatis C and Hx of  shunt placement.
He presented to [facility] ED via EMS on  when neighbor reportedly found him unco
nscious in his home with his home oxygen off. Per family report, patient had bee
n declining over the last month significantly. CT head with chronic Left SDH gerardo
suring 3 mm and demonstrates  shunt. Decision made to transfer to [facility] ED for c
omprehensive trauma w/u. On arrival to ED, he was hypertensive and started on ca
rdene infusion for SBP < 160 mmHg. He was subsequently admitted to Neurosurgical
ICU for further management. 2021 attempted 2 LP's and unable to obtained. 
Initiated antibiotics for meningitis coverage and consulted ID. INR trending up 
required vitamin K.



PAST MEDICAL HISTORY: 

Past Medical History: 

Diagnosis Date 

 Cerebral aneurysm  

 COPD (chronic obstructive pulmonary disease) (HCC)  



PAST SURGICAL HISTORY:

Past Surgical History: 

Procedure Laterality Date 

 VENTRICULOPERITONEAL SHUNT   



SOCIAL HISTORY:

Social History 



Socioeconomic History 

 Marital status: Single 

  Spouse name: Not on file 

 Number of children: Not on file 

 Years of education: Not on file 

 Highest education level: Not on file 

Occupational History 

 Not on file 

Tobacco Use 

 Smoking status: Not on file 

 Smokeless tobacco: Not on file 

Substance and Sexual Activity 

 Alcohol use: Not on file 

 Drug use: Not on file 

 Sexual activity: Not on file 

Other Topics Concern 

 Not on file 

Social History Narrative 

 Not on file 



Social Determinants of Health 



Financial Resource Strain:  

 Difficulty of Paying Living Expenses: Not on file 

Stress:  

 Feeling of Stress : Not on file 

Intimate Partner Violence:  

 Fear of Current or Ex-Partner: Not on file 

 Emotionally Abused: Not on file 

 Physically Abused: Not on file 

 Sexually Abused: Not on file 



FAMILY HISTORY:

History reviewed. No pertinent family history.



ALLERGIES:

Patient has no known allergies.



PRIOR TO ADMISSION MEDICATIONS:

No medications prior to admission. 



24-HOUR HISTORY/EVENTS OF NOTE:

Remains critically ill with unknown etiology of his encephalopathy. Intermittent
ly becomes restless and agitated. Continue Precedex drip.  Unable to obtain LP. 
Antibiotics initiated for suspected meningitis and consulted ID. PICC obtained f
or multiple antibiotics. MRI of brain pending. INR trending up. Initiated vitami
n K. Consulted nutrition for tube feedings. Consulted ID. Discontinued steroids.
Initiated Tenex 1mg po bid and wean Precedex infusion to goal. Streptococcus pn
eumoniae negative. Repeat Hbg 7.0. Overnight, unable to wean off precedex infusi
on. Gave 25mcg fentanyl x1 for agitation. Midnight Hgb 6.8 and transfused 1 U ME
BCs. 



ROS:

ROS unobtainable because incomprehensible speech, would not particpiate in quest
ions 



               

PHYSICAL EXAM:

Vitals:

/71  | Pulse 81  | Temp (!) 35.9 C (96.6 F)  | Resp 20  | Ht 1.676 m (
5' 6")  | Wt 59.4 kg (130 lb 15.3 oz)  | SpO2 90%  | BMI 21.14 kg/m 



Respiratory Support: 

O2 Device: Nasal cannula

O2 Flow Rate (L/min): 4 L/min 

  



T-High:

Temp (24hrs), Av.5 C (97.7 F), Min:35.9 C (96.6 F), Max:36.9 C (98
.4 F)



Fluid Balance:

I/O last 24 Hours:

In: 3481.9 [I.V.:1960.5; NG/GT:307; IV Piggyback:1214.4]

Out: 680 [Urine:680]



General Appearance:    Lying in bed, agitated  

Neurologic:  Agitated, Opens eyes spontaneously and with painful stimulation. Do
es not answer orientation questions, responds to deep painful stimulation, with 
stimulation had attempts at vocalization of words, but speech was  Incomprehensi
ble speech.  No apparent facial droop present. Does not follow commands

RUE: Spontaneously moves; localizes 

RLE: Spontaneously moves; localizes

LUE: Spontaneously moves; localizes

LLE: Spontaneously moves; localizes 

HEENT:  Eyes: Left pupil 2mm and Right 2 mm round and reactive to light. Unable 
to test EOM's and visual fields due to mentation. Sclera white, no edema. Head: 
normocephalic, atraumatic. Neck: Trachea midline. No JVD. Throat/Mouth: oral muc
martina pink, no lesions 

Lungs:     Clear, but diminished throughout to auscultation bilaterally, no acce
ssory muscle use 

 Heart:    Regular rate and rhythm, S1/S2, no murmur, no rub 

Abdomen:     Soft, non-tender, bowel sounds hypoactive all four quadrants 

Genitourinary:    Castellano in place  

Extremities:   Extremities with full active ROM, moderate BUE edema  

Pulses/Perfusion:   2+ pulses radial and R pedal 1+ L pedal dopplered, warm and 
well perfused 

                               Skin:   Scattered generalized bruising  

                 Surgical Site:  None 

Exam copied from previous note and updated appropriately based on today's exam, 
Roberta Bailey, ANP 

LAB RESULTS:

Most Recent Result from last 24 hours 

Lab Units 21

0029 

WBC TH/uL 11.36* 

HEMOGLOBIN g/dL 6.8* 

HEMATOCRIT % 25* 

PLATELET COUNT Th/uL 202 



Most Recent Result from last 24 hours 

Lab Units 21

0029 

SODIUM mEq/L 152* 

POTASSIUM mEq/L 4.3 

CARBON DIOXIDE mEq/L 28 

BLOOD UREA NITROGEN mg/dL 46* 

CREATININE mg/dL 1.20 

GLUCOSE mg/dL 151* 

CALCIUM mg/dL 8.0* 



Most Recent Result from last 24 hours 

Lab Units 21

0029 

PROTEIN TOTAL SERUM g/dL 5.2* 

ALKALINE PHOSPHATASE U/L 50 

ALANINE AMINOTRANSFERASE U/L 114* 

ASPARTATE AMINOTRANSFERASE U/L 98* 

GLUCOSE mg/dL 151* 



Most Recent Result from last 24 hours 

Lab Units 21

0029 

MAGNESIUM mg/dL 2.30 



Most Recent Result from last 24 hours 

Lab Units 21

0029 

INR  1.3* 



ABG:

Most Recent Result within the last 7 days 

Lab Units 21

1110 

PH ARTERIAL units 7.29* 

PCO2 ARTERIAL mmHg 63* 

PO2 ARTERIAL mmHg 87 

BICARBONATE mEq/L 30.3* 



Cultures:

No results found for this visit on 21.



ECHOCARDIOGRAPHY:

Echo Complete with Doppler and Color Flow



Result Date: 2021

  1. Normal left ventricular systolic function, with an estimated ejection fract
ion of 60%. 

  2. Normal right ventricular size and systolic function. 

  3. No significant valvular abnormalities. 

  No previous study available for comparison. 



  Dr Johnny Benavidez

  (Electronically Signed)

  Final Date:    2021 12:38



RADIOLOGY/IMAGING:

CT Abdomen Pelvis wo contrast



Result Date: 2021



1. No evidence of acute traumatic or solid organ injury within the

abdomen.

2. Hyperdense material within mildly distended gallbladder may relate to

gallbladder sludge or cholelithiasis. Right upper quadrant ultrasound

can be obtained for further evaluation, as clinically indicated.

3. Hepatic cirrhosis. Small volume abdominal pelvic ascites.

4. Moderate colonic stool.



ATTESTATION STATEMENT:

The Staff Radiologist has personally reviewed the images and dictated,

reviewed, or edited the final report.



READING SITE: [facility]



CT Cervical Spine wo contrast



Result Date: 2021

1. No acute fracture. Slight anterolisthesis of C2 on C3 with mild

widening of the C2-C3 disc space may relate to patient positioning,

however ligamentous injury would be difficult to exclude given patient

history of trauma. MRI cervical spine is recommended for further

evaluation if clinically indicated.

2. Moderate degenerative cervical spondylosis.



ATTESTATION STATEMENT: The Staff Radiologist has personally reviewed the

images and dictated, reviewed, or edited the final report.



READING SITE: [facility]



CT Chest wo contrast



Result Date: 2021

1.  Diffuse interstitial edema.

2.  Posterior relaxation and scattered subsegmental atelectasis. No

pulmonary mass or confluent consolidation.

3.  Cardiomegaly. Heavy coronary artery calcifications. Aortic valve

leaflet calcifications could represent calcific aortic stenosis.

4.  Small bilateral pleural effusions with features of partial

loculation on the right. No pneumothorax.

5.  Small upper abdominal ascites.



READING SITE: [facility]



CT Head wo contrast



Result Date: 2021

Impression:



1. Stable mixed attenuation left holohemispheric extra-axial collection

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

5 mm in maximal thickness. No new or enlarging intracranial hemorrhage.

No significant mass effect or discrete midline shift. Similar

ventriculomegaly.

2. Stable gray-white loss involving the right frontotemporal lobes and

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



3. Stable right parietal approach ventricular shunt catheter. 

4. Redemonstrated postprocedural changes of prior aneurysm coiling are

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

given location.



ATTESTATION STATEMENT: The Staff Radiologist has personally reviewed the

images and dictated, reviewed, or edited the final report.



CT Lumbar Spine reconstructed



Result Date: 2021



1.  No acute osseous abnormality of the lumbar spine.

2.  Mild to moderate multilevel lumbar spondylosis.



READING SITE: [facility].



ATTESTATION STATEMENT:

The Staff Radiologist has personally reviewed the images and dictated,

reviewed, or edited the final report.



CT Thoracic Spine reconstructed



Result Date: 2021

Impression:



Mild to moderate multilevel degenerative thoracic spondylosis.



READING SITE: [facility].



ATTESTATION STATEMENT:

The Staff Radiologist has personally reviewed the images and dictated,

reviewed, or edited the final report.



IR Lumbar puncture w fluoro



Result Date: 2021



Unable to collect spinal fluid at multiple levels. The spinal needle tip

was confirmed within the thecal sac on AP and lateral on multiple

levels. 



ATTESTATION STATEMENT: The Staff Physician has personally reviewed the

images and dictated, reviewed, or edited the final report.



READING SITE: [facility]



XR Abdomen single view AP



Result Date: 2021

1.  Enteric tube coiled in the proximal stomach with sidehole near the

cardia and tip near the GE junction.

2.  Nonobstructive bowel gas pattern.

3.  Heterogeneous opacities in the right midlung zone. Recommend

attention on ordered follow-up CT.



COMMUNICATION: Finding of tube position was verbally communicated and

acknowledged via telephone to Angie Boles RN, at 2021 10:50 AM.



ATTESTATION STATEMENT: Staff radiologist has personally reviewed the

images and dictated, reviewed and/or edited the final report.



READING SITE: [facility]



XR Chest single view frontal



Result Date: 2021



1. Abnormal curvilinear lucency at the left lateral lung base and

costophrenic angle with apparent deep sulcus. Although no definitive

pleural line is noted, findings are equivocal for a small pneumothorax

given history of trauma. Contrast-enhanced chest CT is recommended for

further evaluation.



2. No focal airspace disease.



Results were communicated by telephone to patient's nurse Angie by Dr. Wali Garrett on 2021 at 9:56 AM.



READING SITE: [facility]



ATTESTATION STATEMENT:

The Staff Radiologist has personally reviewed this study and agrees with

the findings in this report.



Echo Complete with Doppler and Color Flow



Result Date: 2021

  1. Normal left ventricular systolic function, with an estimated ejection fract
ion of 60%. 

  2. Normal right ventricular size and systolic function. 

  3. No significant valvular abnormalities. 

  No previous study available for comparison. 



  Dr Johnny Benavidez

  (Electronically Signed)

  Final Date:    2021 12:38



MEDICATIONS:

acyclovir, 10 mg/kg (Ideal), Q12H MARQUISE

ampicillin, 2 g, Q6H MARQUISE

cefepime, 2 g, Q12H MARQUISE

famotidine, 20 mg, Q12H

 Or

famotidine, 20 mg, Q12H

guanFACINE, 1 mg, BID

ipratropium-albuteroL, 3 mL, 4x daily

senna-docusate, 1 tablet, BID

vancomycin, 1,000 mg, Q12H



INFUSIONS:

 dexmedeTOMIDINE 0.9 mcg/kg/hr (21) 

 lactated Ringers 100 mL/hr (21) 



ICU BEST PRACTICE:

 CODE STATUS: FULL CODE

 LOS: 3

 

 DELIRIUM PRESENT:  No

 SEDATION VACATION: N/A

 SBT: N/A

 

 DIET: tube feed Osmolite 1.5 Lonnie 



 GI PROPHYLAXIS: not indicated at this time 

 GLYCEMIC CONTROL: No

 VTE PREVENTION: SCDs

 

 CASTELLANO: No Castellano

 LINES: PICC Line left

NG/OG Tube

 DRAINS: none 

 AIRWAY: Not Intubated



 ANTIBIOTIC REVIEW: Yes

 MAR/HOME MED REVIEW: Yes

 BOWEL REGIMEN: Yes

 BM LAST 48 HRS: No



 THERAPIES: PT, OT and ST

 MOBILITY:  PT and OT



 PPE Statement: CHANO Grimm used Yellow precautions (Level 1 mask wor
n over level 3 mask, eye protection, and gloves).



DIAGNOSIS:

Neuro:

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 

Hx of cerebral aneurysm with coil

 shunt 

Encephalopathy  



Cardo:

        Elevated troponin



Pulm:

Chronic respiratory failure

COPD requiring baseline oxygen supplementation 

Bronchitis 



HEME:

Anemia 

Leukocytosis 

Elevated INR  



Renal:

Acute Kidney Injury vs CKD

Hypernatremia  



ID:

Concern for Encephalitis/Meningitis - Acyclovir, Ampicillin, Vancomycin, Cefepim
e D 3 



PLAN: 

Consult Neurology 

Liberalize neuro checks Q 4 hours

CIWA protocol with PRN ativan 

Thiamine 100 mg Q daily   

SBP goal < 160

Continue Acyclovir, Ampicillin, Vancomycin, Cefepime 

Wean Precedex gtt 

Increase Tenex 2 mg BID 

5 mg of IV Haldol x 1 for MRI 

Increase Senna 2 tablets BID

Bisacodyl 10 mg suppository  

Increase  mL Q 4 hours 

Trend NA every 12 hours 

Obtain MRI of the head when able 

Venous Duplex BUE 

Day/Night hygiene 



During multidisciplinary rounds, I personally reviewed the patient events of the
 previous 24 hours, physical exam, laboratory findings, radiologic studies inclu
ding images, fluid balance, neurologic status, cardiovascular status,  pulmonary
 status, metabolic status including nutrition, and medications. Catrachito Pelaez
er is HD 3 following admission after being found down at home. He remains enceph
alopathic today and requires both Tenex and precedex infusion for acute agitatio
n. His MRI imaging was delayed, since there was no documentation about his VPS. 
Radiology is planning on completing MRI today, hopefully it will give us more in
sight into potential infectious process with possible meningitis vs encephalitis
. Given that two LPs were attempted without success we will continue broad spect
rum therapies and follow ID recommendations closely. Given that his encephalopat
hy has not improved we consulted Neurology to help with treatment guidance. I di
scussed with Valerie NP with Neurology. She spoke to the family and reportedly t
hey found several empty bottles of whisky and empty hydrocodone bottles around h
is home. Given this new information he is likely in acute alcohol withdrawal, so
 I will add CIWA protocol. His NA continues to trend upwards so I will increase 
 ML Q 4 hours. I will trend NA Q 12 hours and continue LR at this time gi
anjali potential free water deficit. Unable to use 1/2 NS given his chronic SDH and
 risk for cerebral edema. He did have BUE swelling and I am concerned for DVT, s
o I will get venous duplex. He has yet to have bowel movement, so I will increas
e his senna 2 tablets BID and add bisacodyl suppository. I will continue to foll
ow him closely while he remains in the ICU. 



Unable to provide education due to mentation. 



I, CHANO Grimm, have reviewed all of these findings and the overall a
ssessment and plans for the day are discussed and documented in the Medical Pete
rd Note. I was personally present and involved in all aspects of patient care.



Level 3

CHANO Grimm  







Electronically signed by Alysha Tsai MD at 2021  8:06 AM CST





Associated attestation - Alysha Tsai MD - 2021  8:06 AM CST



Formatting of this note is different from the original.

Attestation signed by Alysha Tsai MD at 2021  1:43 PM 

Patient was seen and examined.  Agree with outlined plan

 

1-Encephalopathy: Slightly improved exam but remains encephalopathic. 

   MRI Pending

   Increase Tenex and wean Precedex 

   Neurology consult

 

Hypernatremia: Increase Free water per FT

 

I spent 33 min of Critical Care excluding procedure  



* Sara Nye RN CWON - 2021  4:07 PM CST



Formatting of this note might be different from the original.

Images from the original note were not included.

Initial Wound Assessment



Chief Complaint/Reason for Consult: feet



History of Present Illness: SDH and altered mental status



Focused Review of Systems: PMH: COPD,  shunt, 



Assessment:

Patient with chronic dry plaque to left foot.  Recommend daily hygiene and moist
urizing. Wound care team will complete consult at this time. 



  

Left 



Right 



Plan/Recommendation: 

 Q 2 hour turn while in bed

 Q 1 hour turn when in chair or HOB is over 30 degrees

 Pressure Redistribution Boot

 Chair Cushion 

 Reduce layers of linen

 Position Wedges

 Dressing changes: see above



Sara Nye RN CWON





Electronically signed by Sara Nye RN CWON at 2021  4:10 PM CST

* Griselda Bledsoe RN APRN - 2021  9:15 AM CST



Formatting of this note is different from the original.

  Critical Care Progress Note  



PATIENT NAME: Catrachito Angel

DATE of SERVICE: 2021 

CPI: [id number]

AGE: 71 y.o.

: 1950



CHIEF COMPLAINT: here with encephalopathy 



DATE OF PROCEDURES: none



HOSPITAL COURSE: Due to patient arriving with altered mental status, primary med
ical information obtained from medical records/family report at bedside. Mr. Darcie paredes is a 70 yo male with a PMHx of COPD requiring baseline O2 supplement, H
x of cerebral aneurysm s/p coil, chronic hepatis C and Hx of  shunt placement.
 He presented to [facility] ED via EMS on  when neighbor reportedly found him unco
nscious in his home with his home oxygen off. Per family report, patient had bee
n declining over the last month significantly. CT head with chronic Left SDH gerardo
suring 3 mm and demonstrates  shunt. Decision made to transfer to [facility] ED for c
omprehensive trauma w/u. On arrival to ED, he was hypertensive and started on ca
rdene infusion for SBP < 160 mmHg. He was subsequently admitted to Neurosurgical
 ICU for further management. 



PAST MEDICAL HISTORY: 

Past Medical History: 

Diagnosis Date 

 Cerebral aneurysm  

 COPD (chronic obstructive pulmonary disease) (HCC)  



PAST SURGICAL HISTORY:

Past Surgical History: 

Procedure Laterality Date 

 VENTRICULOPERITONEAL SHUNT   



SOCIAL HISTORY:

Social History 



Socioeconomic History 

 Marital status: Single 

  Spouse name: Not on file 

 Number of children: Not on file 

 Years of education: Not on file 

 Highest education level: Not on file 

Occupational History 

 Not on file 

Tobacco Use 

 Smoking status: Not on file 

 Smokeless tobacco: Not on file 

Substance and Sexual Activity 

 Alcohol use: Not on file 

 Drug use: Not on file 

 Sexual activity: Not on file 

Other Topics Concern 

 Not on file 

Social History Narrative 

 Not on file 



Social Determinants of Health 



Financial Resource Strain:  

 Difficulty of Paying Living Expenses: Not on file 

Stress:  

 Feeling of Stress : Not on file 

Intimate Partner Violence:  

 Fear of Current or Ex-Partner: Not on file 

 Emotionally Abused: Not on file 

 Physically Abused: Not on file 

 Sexually Abused: Not on file 



FAMILY HISTORY:

History reviewed. No pertinent family history.



ALLERGIES:

Patient has no known allergies.



PRIOR TO ADMISSION MEDICATIONS:

No medications prior to admission. 



24-HOUR HISTORY/EVENTS OF NOTE:

Continue NS @ 100ml/hr. Echocardiogram obtained with 60% EF. Discontinue azithro
mycin and start

Rocephin 2gm. Lactate 1.0. Ammonia <10. Repeat ABG 7.29/63/87/30.3. VEEG prolong
 obtained. NGT placed. Repeat H/H 7.8/28. Repeat INR 1.1. Initiated Precedex 
infusion for agitation. Overnight, unable to obtain LP in Neuro IR. Received 
multiple IV pushes of fentanyl and versed, and 5 mg haldol during procedure. 
Started on Vancomycin, cefepime, ampicillin and acyclovir. Upon return to NSICU 
notified of right pupil 1mm and left pupil 2mm, both reactive to light. Patient 
moved all extremities spontaneously, will continue to monitor. Episode of 
hypotension and bradycardia, paused precedex infusion. Gave 500mL bolus, repeat 
lactate 1.5. Restarted precedex infusion at 0.4 (half original dose) for 
agitation and gave 25mcg fentanyl x2 and 2.5mg of haldol. Nursing unable to 
obtain IV assess. Placed peripheral IV x2 under ultrasound guidance. Repeat H/H 
ordered for noon.



ROS:

ROS unobtainable because patient with AMS.  

               

PHYSICAL EXAM:

Vitals:

/53  | Pulse 76  | Temp 36.5 C (97.7 F)  | Resp 17  | Ht 1.676 m (5' 6
")  | Wt 59.4 kg (130 lb 15.3 oz)  | SpO2 100%  | BMI 21.14 kg/m 



Respiratory Support: 

O2 Device: Nasal cannula

O2 Flow Rate (L/min): 3 L/min 

  



T-High:

Temp (24hrs), Av.5 C (97.7 F), Min:35 C (95 F), Max:37.5 C (99.5 
F)



Fluid Balance:

I/O last 24 Hours:

In: 3031.3 [I.V.:1921.9; NG/GT:20; IV Piggyback:1089.4]

Out: 350 [Urine:350]



General Appearance:    Lying in bed, no acute distress 

Neurologic:  Arousable with noxious stimuli. Incomprehensible speech Responds to
 noxious stimuli. No facial droop present. Does not follow commands

RUE: Spontaneously moves; localizes 

RLE: Spontaneously moves; localizes

LUE: Spontaneously moves; localizes

LLE: Spontaneously moves; localizes 

HEENT:  Eyes: Left pupil 4mm and Right 3mm round and reactive to light. Unable t
o test EOM's and visual fields. Sclera white, no edema. Head: normocephalic, atr
aumatic. Neck: Trachea midline. No JVD. Throat/Mouth: oral mucosa pink, no lesio
ns 

Lungs:     Coarse to auscultation bilaterally, no accessory muscle use 

 Heart:    Regular rate and rhythm, S1/S2, no murmur, no rub 

Abdomen:     Soft, non-tender, bowel sounds active all four quadrants 

Genitourinary:    No edema; no lesions 

Extremities:   Extremities with full active ROM, no edema 

Pulses/Perfusion:   2+ pulses radial and dorsalis pedis, warm and well perfused 

                               Skin:   No rashes or lesions 

                 Surgical Site:  None 

Exam completed and changes made Griselda Bledsoe



LAB RESULTS:

Most Recent Result from last 24 hours 

Lab Units 21

0028 

WBC TH/uL 12.75* 

HEMOGLOBIN g/dL 7.3* 

HEMATOCRIT % 27* 

PLATELET COUNT Th/uL 238 



Most Recent Result from last 24 hours 

Lab Units 21

2143 

SODIUM mEq/L 149* 

POTASSIUM mEq/L 4.6 

CARBON DIOXIDE mEq/L 28 

BLOOD UREA NITROGEN mg/dL 46* 

CREATININE mg/dL 1.20 

GLUCOSE mg/dL 127* 

CALCIUM mg/dL 9.6 



Most Recent Result from last 24 hours 

Lab Units 21

2143 

GLUCOSE mg/dL 127* 



Most Recent Result from last 24 hours 

Lab Units 21

2143 

MAGNESIUM mg/dL 2.40 



Most Recent Result from last 24 hours 

Lab Units 21

0640 21

1200 21

1200 

APTT Sec  --   --  30 

INR  1.6*   < > 1.1 

 < > = values in this interval not displayed. 



ABG:

Most Recent Result within the last 7 days 

Lab Units 21

1110 

PH ARTERIAL units 7.29* 

PCO2 ARTERIAL mmHg 63* 

PO2 ARTERIAL mmHg 87 

BICARBONATE mEq/L 30.3* 



Cultures:

No results found for this visit on 21.



ECHOCARDIOGRAPHY:

Echo Complete with Doppler and Color Flow



Result Date: 2021

  1. Normal left ventricular systolic function, with an estimated ejection fract
ion of 60%. 

  2. Normal right ventricular size and systolic function. 

  3. No significant valvular abnormalities. 

  No previous study available for comparison. 



  Dr Johnny Benavidez

  (Electronically Signed)

  Final Date:    2021 12:38



RADIOLOGY/IMAGING:

CT Abdomen Pelvis wo contrast



Result Date: 2021



1. No evidence of acute traumatic or solid organ injury within the

abdomen.

2. Hyperdense material within mildly distended gallbladder may relate to

gallbladder sludge or cholelithiasis. Right upper quadrant ultrasound

can be obtained for further evaluation, as clinically indicated.

3. Hepatic cirrhosis. Small volume abdominal pelvic ascites.

4. Moderate colonic stool.



ATTESTATION STATEMENT:

The Staff Radiologist has personally reviewed the images and dictated,

reviewed, or edited the final report.



READING SITE: [facility] CT Cervical Spine wo contrast



Result Date: 2021

1. No acute fracture. Slight anterolisthesis of C2 on C3 with mild

widening of the C2-C3 disc space may relate to patient positioning,

however ligamentous injury would be difficult to exclude given patient

history of trauma. MRI cervical spine is recommended for further

evaluation if clinically indicated.

2. Moderate degenerative cervical spondylosis.



ATTESTATION STATEMENT: The Staff Radiologist has personally reviewed the

images and dictated, reviewed, or edited the final report.



READING SITE: [facility] CT Chest wo contrast



Result Date: 2021

1.  Diffuse interstitial edema.

2.  Posterior relaxation and scattered subsegmental atelectasis. No

pulmonary mass or confluent consolidation.

3.  Cardiomegaly. Heavy coronary artery calcifications. Aortic valve

leaflet calcifications could represent calcific aortic stenosis.

4.  Small bilateral pleural effusions with features of partial

loculation on the right. No pneumothorax.

5.  Small upper abdominal ascites.



READING SITE: [facility]



CT Head wo contrast



Result Date: 2021

Impression: Patient motion artifact and suboptimal positioning degrades

image quality. 



1. Mixed attenuation left holohemispheric likely subacute on chronic

subdural hematoma which measures up to 6 mm in maximal thickness. No

significant mass effect or discrete midline shift given suboptimal

patient positioning. Similar ventriculomegaly when compared to outside

CT.

2. Gray-white loss is identified within the right frontotemporal lobe

likely representing a subacute or chronic right MCA territory infarct.

3. Right parietal approach ventricular shunt catheter is identified with

the distal intracranial and terminating near the foramen of Cook. The

partially visualized ventricular shunt catheter tubing and port appear

intact where visualized. 

4. Postprocedural changes of prior aneurysm coiling are identified

possibly involving a basilar tip aneurysm.



ATTESTATION STATEMENT: The staff radiologist has personally reviewed the

images and dictated, reviewed, or edited the final report.



READING SITE: [facility].



CT Head wo contrast



Result Date: 2021

Impression:



1. Stable mixed attenuation left holohemispheric extra-axial collection

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

5 mm in maximal thickness. No new or enlarging intracranial hemorrhage.

No significant mass effect or discrete midline shift. Similar

ventriculomegaly.

2. Stable gray-white loss involving the right frontotemporal lobes and

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



3. Stable right parietal approach ventricular shunt catheter. 

4. Redemonstrated postprocedural changes of prior aneurysm coiling are

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

given location.



ATTESTATION STATEMENT: The Staff Radiologist has personally reviewed the

images and dictated, reviewed, or edited the final report.



CT Lumbar Spine reconstructed



Result Date: 2021



1.  No acute osseous abnormality of the lumbar spine.

2.  Mild to moderate multilevel lumbar spondylosis.



READING SITE: [facility].



ATTESTATION STATEMENT:

The Staff Radiologist has personally reviewed the images and dictated,

reviewed, or edited the final report.



CT Thoracic Spine reconstructed



Result Date: 2021

Impression:



Mild to moderate multilevel degenerative thoracic spondylosis.



READING SITE: [facility].



ATTESTATION STATEMENT:

The Staff Radiologist has personally reviewed the images and dictated,

reviewed, or edited the final report.



IR Lumbar puncture w fluoro



Result Date: 2021



Unable to collect spinal fluid at multiple levels. The spinal needle tip

was confirmed within the thecal sac on AP and lateral on multiple

levels. 



ATTESTATION STATEMENT: The Staff Physician has personally reviewed the

images and dictated, reviewed, or edited the final report.



READING SITE: [facility]



XR Abdomen single view AP



Result Date: 2021

1.  Enteric tube coiled in the proximal stomach with sidehole near the

cardia and tip near the GE junction.

2.  Nonobstructive bowel gas pattern.

3.  Heterogeneous opacities in the right midlung zone. Recommend

attention on ordered follow-up CT.



COMMUNICATION: Finding of tube position was verbally communicated and

acknowledged via telephone to Angie Boles RN, at 2021 10:50 AM.



ATTESTATION STATEMENT: Staff radiologist has personally reviewed the

images and dictated, reviewed and/or edited the final report.



READING SITE: [facility]



XR Chest single view frontal



Result Date: 2021



1. Abnormal curvilinear lucency at the left lateral lung base and

costophrenic angle with apparent deep sulcus. Although no definitive

pleural line is noted, findings are equivocal for a small pneumothorax

given history of trauma. Contrast-enhanced chest CT is recommended for

further evaluation.



2. No focal airspace disease.



Results were communicated by telephone to patient's nurse Angie by Dr. Wali Garrett on 2021 at 9:56 AM.



READING SITE: [facility]



ATTESTATION STATEMENT:

The Staff Radiologist has personally reviewed this study and agrees with

the findings in this report.



XR Chest single view frontal



Result Date: 2021

No acute chest abnormality. 



READING SITE: [facility]



THIS DOCUMENT HAS BEEN ELECTRONICALLY SIGNED BY ALEX GONCALVES MD



XR Pelvis one or two views



Result Date: 2021

Negative for acute pelvic fracture. 



READING SITE: [facility]



THIS DOCUMENT HAS BEEN ELECTRONICALLY SIGNED BY ALEX GONCALVES MD



Echo Complete with Doppler and Color Flow



Result Date: 2021

  1. Normal left ventricular systolic function, with an estimated ejection fract
ion of 60%. 

  2. Normal right ventricular size and systolic function. 

  3. No significant valvular abnormalities. 

  No previous study available for comparison. 



  Dr Johnny Benavidez

  (Electronically Signed)

  Final Date:    2021 12:38



MEDICATIONS:

acyclovir, 10 mg/kg (Ideal), Q12H MARQUISE

ampicillin, 2 g, Q6H MARQUISE

cefepime, 2 g, Q12H MARQUISE

famotidine, 20 mg, Q12H

 Or

famotidine, 20 mg, Q12H

ipratropium-albuteroL, 3 mL, 4x daily

methylPREDNISolone sodium succinate, 40 mg, Q8H MARQUISE

senna-docusate, 1 tablet, BID

vancomycin, 1,000 mg, Q12H



INFUSIONS:

 dexmedeTOMIDINE 0.8 mcg/kg/hr (21 2622) 

 lactated Ringers 100 mL/hr (21 1005) 



ICU BEST PRACTICE:

 CODE STATUS: FULL CODE

 LOS: 2

 

 DELIRIUM PRESENT:  No

 SEDATION VACATION: N/A

 SBT: N/A

 

 DIET: NPO, strict



 GI PROPHYLAXIS: not indicated at this time 

 GLYCEMIC CONTROL: No

 VTE PREVENTION: SCDs

 

 CASTELLANO: No Castellano

 LINES: PIV x2

 DRAINS: none 

 AIRWAY: Not Intubated



 ANTIBIOTIC REVIEW: Yes

 MAR/HOME MED REVIEW: Yes

 BOWEL REGIMEN: Yes

 BM LAST 48 HRS: N/A



 THERAPIES: PT, OT and ST

 MOBILITY:  PT and OT



 PPE Statement: Griselda Bledsoe RN APRN used Yellow precautions (Level 1 mask
 worn over level 3 mask, eye protection, and gloves).



DIAGNOSIS:

Neuro:

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 

Hx of cerebral aneurysm with coil

 shunt 

Encephalopathy  



Cardo:

        Elevated troponin



Pulm:

Chronic respiratory failure

COPD requiring baseline oxygen supplementation 

Bronchitis

 



HEME:

Anemia 

Leukocytosis 

Elevated INR  



Renal:

Acute Kidney Injury vs CKD

Hypernatremia  



ID:

Urinary tract infection - Ceftriaxone D2 



PLAN: 

Continue antibiotics for meningitis coverage

Consult for PICC

Consult ID

Continue Precedex infusion

Repeat H/H @ 1200

Maintain INR <1.4

INR now

Consult nutrition for tube feedings

Vitamin K 10mg IV

MRI of head

Consult ID



During multidisciplinary rounds, I personally reviewed the patient events of the
 previous 24 hours, physical exam, laboratory findings, radiologic studies inclu
ding images, fluid balance, neurologic status, cardiovascular status including p
ulmonary status, metabolic status including nutrition, and medications. Catrachito dan remains critically ill with unknown etiology of his encephalopathy. H
e intermittently becomes restless and agitated. Continue Precedex drip.  Unable 
to obtain LP. Antibiotics initiated for suspected meningitis and consulted ID. P
ICC obtained for multiple antibiotics. Will get MRI today. His INR is trending u
p. Initiated vitamin K. Consulted nutrition for tube feedings. I have personally
 reviewed his images, hospital medications, home medications, labs and discussed
 his care and plan with pharmacy, and the critical care team. Will continue to m
onitor closely while in the ICU.



I, Griselda Bledsoe RN APRN, have reviewed all of these findings and the overa
ll assessment and plans for the day are discussed and documented in the Medical 
Record Note. I was personally present and involved in all aspects of patient car
e.



Critical Care 35 minutes

Griselda Bledsoe RN APRN  







Electronically signed by Alysha Tsai MD at 2021  1:43 PM CST





Associated attestation - Alysha Tsai MD - 2021  1:43 PM CST



Formatting of this note might be different from the original.

Patient was seen and examined.  Agree with outlined plan



1-Encephalopathy: Slightly improved exam but remains encephalopathic. 

   EEG: diffuse slowing without evidence of Seizure

   LP unsuccessful per myself and IR, On Empiric Abx and Anti viral 

   MRI today



2-Hepatic failure:  INR 1.6, Give Vit K



I spent 33 min of Critical Care excluding procedure 



* Thomas Das NP - 2021  6:28 AM CST



Formatting of this note is different from the original.

[facility]

Trauma and Critical Care Specialists Progress Note



Patient Name:  Catrachito Angel

Date of Service:  2021 



Date of Admission: 2021

 LOS: 2 days 

MRN:  [id number]

YOB: 1950



HPI/BRIEF HOSPITAL COURSE:

Catrachito Angel is a 71 y.o. male w/ PMH most significant for COPD, chronic h
ypoxic respiratory failure dependent on supplemental oxygen, prior cerebral aneu
rysm coiling and  shunt placement, and hx Hep C who transferred from [facility] [place] and presented as a green trauma activation s/p unconscious on .  It was 
reported that patient has been fatigued with a decrease in function over the 
t month.  The family was concerned that the patient may have had a stroke last w
[employer] [place] but refused EMS transport.  Then , paramedics were called by patient's 
neighbor d/t found unresponsive with his oxygen off.  EMS took patient to [facility] and imaging revealed subacute CVA and left cerebellar subdural hemorrhage,
 then transferred to [facility] for further Trauma and Neurosurgical evaluation. 
 Initial labs significant for Na 147, Chl 112, BUN 43, Creat. 1.5, eGFR 55.6, tr
op 171, , , WBC, Hgb 6.1, COVID negative, ABG 7.31/60/90/2.4/30/2 
on 3L/NC, .  Initial 12 lead ECG: ST w/ PVC's.  Initial imaging significan
t for mixed attenuation left holohemispheric likely subacute on chronic subdural
 hematoma, ventriculomegaly, R frontotemporal lobes and R insula likely represen
ting a subacute R MCA territory infarct. 



: Admitted to Trauma service and NSICU.  Neuro Critical Care consulted for 
ICU management.  Neurosurgery consulted for SDH.  Given Vitamin K 10mg IV, 2u FF
P for coagulopathy.   Ceftriaxone IV initiated for UTI and Azithromycin initiate
d for complicated COPD exacerbation w/ solumedrol daily and DuoNebs.  CTH 0500 u
nchanged. 1u PRBC's ordered for Hgb 6.  Troponin elevated.



INJURIES:

1. None



24-Hour History/Events of Note:

[facility] scanned patient.



Past Medical History: 

Past Medical History: 

Diagnosis Date 

 Cerebral aneurysm  

 COPD (chronic obstructive pulmonary disease) (HCC)  



Past Surgical History:

Past Surgical History: 

Procedure Laterality Date 

 VENTRICULOPERITONEAL SHUNT   



Family History:

History reviewed. No pertinent family history.

Social History:

Social History 



Socioeconomic History 

 Marital status: Single 

  Spouse name: Not on file 

 Number of children: Not on file 

 Years of education: Not on file 

 Highest education level: Not on file 

Occupational History 

 Not on file 

Tobacco Use 

 Smoking status: Not on file 

 Smokeless tobacco: Not on file 

Substance and Sexual Activity 

 Alcohol use: Not on file 

 Drug use: Not on file 

 Sexual activity: Not on file 

Other Topics Concern 

 Not on file 

Social History Narrative 

 Not on file 



Social Determinants of Health 



Financial Resource Strain:  

 Difficulty of Paying Living Expenses: Not on file 

Stress:  

 Feeling of Stress : Not on file 

Intimate Partner Violence:  

 Fear of Current or Ex-Partner: Not on file 

 Emotionally Abused: Not on file 

 Physically Abused: Not on file 

 Sexually Abused: Not on file 



Allergies:

Patient has no known allergies.

Prior to Admission Medications:

No medications prior to admission. 



SUBJECTIVE:

Catrachito Angel is not able to participate in ROS or PE d/t LOC



Review of Systems:

ROS unobtainable because LOC



             

OBJECTIVE:

Vital Signs:

/54  | Pulse 73  | Temp 36.6 C (97.9 F)  | Resp 17  | Ht 1.676 m (5' 6
")  | Wt 59.4 kg (130 lb 15.3 oz)  | SpO2 100%  | BMI 21.14 kg/m 

Tmax:  Temp (24hrs), Av.6 C (97.8 F), Min:35 C (95 F), Max:37.5 C 
(99.5 F)



VS Ranges:

Temp:  [35 C (95 F)-37.5 C (99.5 F)] 36.6 C (97.9 F)

Pulse:  [] 73

Resp:  [9-30] 17

BP: ()/(40-99) 123/54 



Physical Exam:

General Appearance: lying with eyes open and restless, in no acute distress

Neurologic:  GCS 13 (E4 V4 M5), DORIS 3+, unable to fully assess d/t agitation

HEENT:  NC/AT, white sclera, no obvious abnormality or drainage 

Neck:  Supple, trachea midline, no JVD. 

Respiratory:

     Airway:  Patent, intact.

     Lungs:  Diffuse expiratory wheezes, diminished bilaterally. Unlabored respi
rations, symmetric chest rise, in no acute respiratory distress.  

Cardiovascular:

Heart:  RRR, no MRG noted

Peripheral Vascular: Bilateral radial and DP pulses +2, cap refill < 3 sec

Abdomen/Gastrointestinal:  Soft, non-distended. Unable to fully assess tendernes
s to palpation given current level of sedation.

Genitourinary:  Castellano in place with clear yellow urine. 

Extremities/musculoskeletal:  No obvious abnormality. Minimal spontaneous moveme
nt secondary to current LOC. Extremities warm and well perfused. No peripheral e
vlad.

Integumentary:  Scattered ecchymosis.  Warm and dry. Adequate peripheral perfusi
on.

Lines/drains/tubes:   PIV, castellano, R nare NGT

Incision(s)/wound(s):  None



Respiratory Support:  

2L/NC

Nasal cannula oxygen as needed to maintain adequate oxygen saturation

 



Intake/Output:

I/O last 24 Hours:

In: 3209.8 [I.V.:2179.9; NG/GT:20; IV Piggyback:1009.9]

Out: 875 [Urine:875]



Laboratory Results:

Most Recent Result from last 24 hours 

Lab Units 21

0028 

WBC TH/uL 12.75* 

HEMOGLOBIN g/dL 7.3* 

HEMATOCRIT % 27* 

PLATELET COUNT Th/uL 238 



Most Recent Result from last 24 hours 

Lab Units 21

2143 

SODIUM mEq/L 149* 

POTASSIUM mEq/L 4.6 

CARBON DIOXIDE mEq/L 28 

BLOOD UREA NITROGEN mg/dL 46* 

CREATININE mg/dL 1.20 

GLUCOSE mg/dL 127* 

CALCIUM mg/dL 9.6 



Most Recent Result from last 24 hours 

Lab Units 21

2143 

GLUCOSE mg/dL 127* 



Most Recent Result from last 24 hours 

Lab Units 21

2143 

MAGNESIUM mg/dL 2.40 



Most Recent Result from last 24 hours 

Lab Units 21

1200 

APTT Sec 30 

INR  1.1 



Microbiology:

No results found for this or any previous visit (from the past 168 hour(s)).

Imaging:

CT Abdomen Pelvis wo contrast



Result Date: 2021

No acute abdominopelvic injury identified. 



READING SITE: [facility]



THIS DOCUMENT HAS BEEN ELECTRONICALLY SIGNED BY ALEX GONCALVES MD



CT Cervical Spine wo contrast



Result Date: 2021

1. No acute fracture. Slight anterolisthesis of C2 on C3 with mild

widening of the C2-C3 disc space may relate to patient positioning,

however ligamentous injury would be difficult to exclude given patient

history of trauma. MRI cervical spine is recommended for further

evaluation if clinically indicated.

2. Moderate degenerative cervical spondylosis.



ATTESTATION STATEMENT: The Staff Radiologist has personally reviewed the

images and dictated, reviewed, or edited the final report.



READING SITE: [facility]



CT Chest wo contrast



Result Date: 2021

1.  Diffuse interstitial edema.

2.  Posterior relaxation and scattered subsegmental atelectasis. No

pulmonary mass or confluent consolidation.

3.  Cardiomegaly. Heavy coronary artery calcifications. Aortic valve

leaflet calcifications could represent calcific aortic stenosis.

4.  Small bilateral pleural effusions with features of partial

loculation on the right. No pneumothorax.

5.  Small upper abdominal ascites.



READING SITE: [facility]



CT Head wo contrast



Result Date: 2021

Impression: Patient motion artifact and suboptimal positioning degrades

image quality. 



1. Mixed attenuation left holohemispheric likely subacute on chronic

subdural hematoma which measures up to 6 mm in maximal thickness. No

significant mass effect or discrete midline shift given suboptimal

patient positioning. Similar ventriculomegaly when compared to outside

CT.

2. Gray-white loss is identified within the right frontotemporal lobe

likely representing a subacute or chronic right MCA territory infarct.

3. Right parietal approach ventricular shunt catheter is identified with

the distal intracranial and terminating near the foramen of Cook. The

partially visualized ventricular shunt catheter tubing and port appear

intact where visualized. 

4. Postprocedural changes of prior aneurysm coiling are identified

possibly involving a basilar tip aneurysm.



ATTESTATION STATEMENT: The staff radiologist has personally reviewed the

images and dictated, reviewed, or edited the final report.



READING SITE: [facility].



CT Head wo contrast



Result Date: 2021

Impression:



1. Stable mixed attenuation left holohemispheric extra-axial collection

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

5 mm in maximal thickness. No new or enlarging intracranial hemorrhage.

No significant mass effect or discrete midline shift. Similar

ventriculomegaly.

2. Stable gray-white loss involving the right frontotemporal lobes and

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



3. Stable right parietal approach ventricular shunt catheter. 

4. Redemonstrated postprocedural changes of prior aneurysm coiling are

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

given location.



ATTESTATION STATEMENT: The Staff Radiologist has personally reviewed the

images and dictated, reviewed, or edited the final report.



CT Lumbar Spine reconstructed



Result Date: 2021



1.  No acute osseous abnormality of the lumbar spine.

2.  Mild to moderate multilevel lumbar spondylosis.



READING SITE: [facility].



ATTESTATION STATEMENT:

The Staff Radiologist has personally reviewed the images and dictated,

reviewed, or edited the final report.



CT Thoracic Spine reconstructed



Result Date: 2021

Impression:



Mild to moderate multilevel degenerative thoracic spondylosis.



READING SITE: [facility].



ATTESTATION STATEMENT:

The Staff Radiologist has personally reviewed the images and dictated,

reviewed, or edited the final report.



XR Abdomen single view AP



Result Date: 2021

1.  Enteric tube coiled in the proximal stomach with sidehole near the

cardia and tip near the GE junction.

2.  Nonobstructive bowel gas pattern.

3.  Heterogeneous opacities in the right midlung zone. Recommend

attention on ordered follow-up CT.



COMMUNICATION: Finding of tube position was verbally communicated and

acknowledged via telephone to Angie Boles RN, at 2021 10:50 AM.



ATTESTATION STATEMENT: Staff radiologist has personally reviewed the

images and dictated, reviewed and/or edited the final report.



READING SITE: [facility]



XR Chest single view frontal



Result Date: 2021



1. Abnormal curvilinear lucency at the left lateral lung base and

costophrenic angle with apparent deep sulcus. Although no definitive

pleural line is noted, findings are equivocal for a small pneumothorax

given history of trauma. Contrast-enhanced chest CT is recommended for

further evaluation.



2. No focal airspace disease.



Results were communicated by telephone to patient's nurse Angie by Dr. Wali Garrett on 2021 at 9:56 AM.



READING SITE: [facility]



ATTESTATION STATEMENT:

The Staff Radiologist has personally reviewed this study and agrees with

the findings in this report.



XR Chest single view frontal



Result Date: 2021

No acute chest abnormality. 



READING SITE: [facility]



THIS DOCUMENT HAS BEEN ELECTRONICALLY SIGNED BY ALEX GONCALVES MD



XR Pelvis one or two views



Result Date: 2021

Negative for acute pelvic fracture. 



READING SITE: [facility]



THIS DOCUMENT HAS BEEN ELECTRONICALLY SIGNED BY ALEX GONCALVES MD



Echo Complete with Doppler and Color Flow



Result Date: 2021

  1. Normal left ventricular systolic function, with an estimated ejection fract
ion of 60%. 

  2. Normal right ventricular size and systolic function. 

  3. No significant valvular abnormalities. 

  No previous study available for comparison. 



  Dr Johnny Benavidez

  (Electronically Signed)

  Final Date:    2021 12:38



Medications:

Scheduled:

acyclovir, 10 mg/kg (Ideal), Q12H MARQUISE

ampicillin, 2 g, Q6H MARQUISE

cefepime, 2 g, Q12H MRAQUISE

famotidine, 20 mg, Q12H

 Or

famotidine, 20 mg, Q12H

ipratropium-albuteroL, 3 mL, 4x daily

methylPREDNISolone sodium succinate, 40 mg, Q8H MARQUISE

phenylephrine HCl in 0.9% NaCl, , 

senna-docusate, 1 tablet, BID

vancomycin, 1,000 mg, Q12H



PRN:

dextrose 50%, 25-50 mL, PRN * glucagon, 1 mg, PRN **OR** glucagon, 1 mg, PRN * g
lucose, 16-32 g, PRN * hydrALAZINE, 10-20 mg, Q2H PRN * ondansetron, 4 mg, Q6H P
RN * prochlorperazine, 2.5-5 mg, Q4H PRN **OR** prochlorperazine, 2.5-5 mg, Q4H 
PRN **OR** prochlorperazine, 25 mg, Q12H PRN



Infusions:

 dexmedeTOMIDINE 0.6 mcg/kg/hr (21) 

 sodium chloride 0.9 % 100 mL/hr (21) 

 



Multidisciplinary:

   Code Status: DNR

   Diet: Diet NPO

   GI Prophylaxis:  na

   VTE Prophylaxis:  SCDs

   :  Castellano for accurate I/Os

   Bowel regimen: Yes

   PT/OT/SLT/SW: PT, OT and ST

   Tertiary complete: Yes - NO ACUTE INJURIES REVEALED



Active Hospital Problems 

 Diagnosis 

 *Encephalopathy 

 UTI (urinary tract infection) 

 SDH (subdural hematoma) (HCC) 

 History of cerebral aneurysm 

 S/P  shunt 

 COPD (chronic obstructive pulmonary disease) (HCC) 

 Bronchitis 

 Elevated INR 

 GREGORY (acute kidney injury) (HCC) 

 Hypernatremia 

 Other specified anemias 

 Elevated troponin 

 Acute pain due to trauma 

 Coagulopathy (HCC) 

 Acute encephalopathy 

 Chronic respiratory failure with hypoxia (HCC) 

 COPD with acute exacerbation (HCC) 

 Leukocytosis 

 Anemia 

 Transaminitis 

 Hx of hepatitis C 



ASSESSMENT/PLAN:

- Patient admitted s/p being found unconscious by neighbors w/o his supplemental
 oxygen on

- CTH revealed subacute on chronic SDH

- Arteaga scanned patient  w/ no acute injuries revealed

- Patient's acute encephalopathy is not due to any acute injuries

- Trauma will sign off at this time d/t no traumatic injuries.  Please contact t
he Trauma Team at any time with questions or concerns regarding traumatic injury
.



PPE Statement: Thomas Das NP used Yellow precautions (Level 3 mask, eye prot
ection, and gloves).



During rounds today the patient events of the previous 24 hours, physical exam, 
laboratory findings, radiologic studies, fluid balance,neurologic status, cardio
vascular status including invasive monitoring data, pulmonary status, and metabo
lic status including nutrition, and medications were reviewed. Catrachito miguel remains in ICU after being found down at home. Pt continues to require monitor
ing of neurologic, hemodynamic, pulmonary, infectious, and pain management statu
s for signs of recovery and/or decline.  



Education on injuries, trajectory of hospitalization, recovery, medications, adilia
atment goal, and plan of care were discussed and mutually agreed upon with patie
nt/family. 



Thomas ROBIN have reviewed all of the above findings and the overall assessmen
t and plans for the day are discussed and documented in the Medical Record Note.
 I was personally present and involved in all aspects of patient care.  Time spe
nt, not including procedures 30.



Thomas Das, MSN, RN, APRN, AGAP-BC [facility] Nurse Practitioner

Available on [employer] [phone]

Pager [phone]



Electronically signed by Thomas Das NP at 2021 11:25 AM CST

* Sara Nye RN CWON - 2021  4:10 PM CST



Formatting of this note might be different from the original.

Attempted to see patient for wound care consult.  Patient undergoing bedside pro
cedure.  Will follow up at a later time. 



Electronically signed by Sara Nye RN CWON at 2021  4:11 PM CST

* Angie Boles RN - 2021  3:45 PM CST



Formatting of this note might be different from the original.

Precedex gtt increased to 1.3mcg/kg/hr, 5mg haldol, 2mg versed, and 50mcg fentan
yl given per Resident Nacho for bedside LP. VSS, pt appears comfortable. Will
 cont to monitor. 



Electronically signed by Angie Boles RN at 2021  3:49 PM CST

* Thomas Das NP - 2021  6:29 AM CST



Formatting of this note is different from the original.

[facility]

Trauma and Critical Care Specialists Progress Note



Patient Name:  Catrachito Angel

Date of Service:  2021 



Date of Admission: 2021

 LOS: 1 day 

MRN:  [id number]

YOB: 1950



HPI/BRIEF HOSPITAL COURSE:

Catrachito Angel is a 71 y.o. male w/ PMH most significant for COPD, chronic h
ypoxic respiratory failure dependent on supplemental oxygen, prior cerebral aneu
rysm coiling and  shunt placement, and hx Hep C who transferred from [facility] [place] and presented as a green trauma activation s/p found down w/ AMS on .  I
t was reported that patient has been fatigued with a decrease in function over t
he last month.  The family was concerned that the patient may have had a stroke 
last week but refused EMS transport.  Then , paramedics were called by sukh
ent's neighbor d/t found unresponsive with his oxygen off.  EMS took patient to 
[facility] and imaging revealed subacute CVA and left cerebellar subdural hemor
rhage, then transferred to [facility] for further Trauma and Neurosurgical evalua
tion.  Initial labs significant for Na 147, Chl 112, BUN 43, Creat. 1.5, eGFR 55
.6, trop 171, , , WBC, Hgb 6.1, COVID negative, ABG 7.31/60/90/2.4
/30/2 on 3L/NC, .  Initial 12 lead ECG: ST w/ PVC's.  Initial imaging sign
ificant for mixed attenuation left holohemispheric likely subacute on chronic gallo
bdural hematoma, ventriculomegaly, R frontotemporal lobes and R insula likely re
presenting a subacute R MCA territory infarct. 



: Admitted to Trauma service and NSICU.  Neuro Critical Care consulted for 
ICU management.  Neurosurgery consulted for SDH.  



INJURIES:

1. Unknown



24-Hour History/Events of Note:

Given Vitamin K 10mg IV, 2u FFP for coagulopathy.   Ceftriaxone IV initiated for
 UTI and Azithromycin initiated for complicated COPD exacerbation w/ solumedrol 
daily and DuoNebs.  CTH 0500 unchanged. 1u PRBC's ordered for Hgb 6.  Troponin e
levated.



Past Medical History: 

Past Medical History: 

Diagnosis Date 

 Cerebral aneurysm  

 COPD (chronic obstructive pulmonary disease) ([facility])  



Past Surgical History:

Past Surgical History: 

Procedure Laterality Date 

 VENTRICULOPERITONEAL SHUNT   



Family History:

History reviewed. No pertinent family history.

Social History:

Social History 



Socioeconomic History 

 Marital status: Single 

  Spouse name: Not on file 

 Number of children: Not on file 

 Years of education: Not on file 

 Highest education level: Not on file 

Occupational History 

 Not on file 

Tobacco Use 

 Smoking status: Not on file 

 Smokeless tobacco: Not on file 

Substance and Sexual Activity 

 Alcohol use: Not on file 

 Drug use: Not on file 

 Sexual activity: Not on file 

Other Topics Concern 

 Not on file 

Social History Narrative 

 Not on file 



Social Determinants of Health 



Financial Resource Strain:  

 Difficulty of Paying Living Expenses: Not on file 

Stress:  

 Feeling of Stress : Not on file 

Intimate Partner Violence:  

 Fear of Current or Ex-Partner: Not on file 

 Emotionally Abused: Not on file 

 Physically Abused: Not on file 

 Sexually Abused: Not on file 



Allergies:

Patient has no known allergies.

Prior to Admission Medications:

No medications prior to admission. 



SUBJECTIVE:

Catrachito Jeanne is not able to participate in ROS or PE d/t LOC



Review of Systems:

ROS unobtainable because LOC



             

OBJECTIVE:

Vital Signs:

/67 (BP Location: Left arm, Patient position: Supine)  | Pulse 95  | Temp 
36.6 C (97.8 F) (Oral)  | Resp 14  | Ht 1.676 m (5' 6")  | Wt 73.1 kg (161 l
b 2.5 oz)  | SpO2 94%  | BMI 26.01 kg/m 

Tmax:  Temp (24hrs), Av.8 C (98.3 F), Min:36.6 C (97.8 F), Max:37.2 
C (99 F)



VS Ranges:

Temp:  [36.6 C (97.8 F)-37.2 C (99 F)] 36.6 C (97.8 F)

Pulse:  [] 95

Resp:  [14-30] 14

BP: ()/() 135/67 



Physical Exam:

General Appearance: lying with eyes closed, appears comfortable lying in bed, in
no acute distress

Neurologic:  GCS 12 (E3 V4 M5), DORIS 3+, unable to fully assess d/t LOC

HEENT:  NC/AT, white sclera, no obvious abnormality or drainage 

Neck:  Supple, trachea midline, no JVD. 

Respiratory:

     Airway:  Patent, intact.

     Lungs:  Diffuse expiratory wheezes, diminished bilaterally. Unlabored respi
rations, symmetric chest rise, in no acute respiratory distress.  

Cardiovascular:

Heart:  RRR, no MRG noted

Peripheral Vascular: Bilateral carotid arteries palpable, bilateral radial and D
P pulses +2, cap refill < 3 sec

Abdomen/Gastrointestinal:  Soft, non-distended. Unable to fully assess tendernes
s to palpation given current level of sedation.

Genitourinary:  Castellano in place with clear yellow urine. 

Extremities/musculoskeletal:  No obvious abnormality. Minimal spontaneous moveme
nt secondary to current LOC. Extremities warm and well perfused. No peripheral e
vlad.

Integumentary:  Warm and dry. Adequate peripheral perfusion.

Lines/drains/tubes:   PIV, castellano

Incision(s)/wound(s):  None



Respiratory Support:  

2-4L/NC

Nasal cannula oxygen as needed to maintain adequate oxygen saturation

 



Intake/Output:

I/O last 24 Hours:

In: 669 [I.V.:622.3; IV Piggyback:46.8]

Out: 1225 [Urine:1225]



Laboratory Results:

Most Recent Result from last 24 hours 

Lab Units 21

1226 21

0102 21

0102 

WBC TH/uL  --   --  12.78* 

HEMOGLOBIN g/dL 7.8*   < > 6.0* 

HEMATOCRIT % 28*   < > 23* 

PLATELET COUNT Th/uL  --   --  322 

 < > = values in this interval not displayed. 



Most Recent Result from last 24 hours 

Lab Units 21

2325 

SODIUM mEq/L 147* 

POTASSIUM mEq/L 4.6 

CARBON DIOXIDE mEq/L 29 

BLOOD UREA NITROGEN mg/dL 44* 

CREATININE mg/dL 1.40* 

GLUCOSE mg/dL 83 

CALCIUM mg/dL 8.8 



Most Recent Result from last 24 hours 

Lab Units 21

0514 21

2325 21

2325 

PROTEIN TOTAL SERUM g/dL 6.0   < > 5.9 

ALKALINE PHOSPHATASE U/L 63   < > 62 

ALANINE AMINOTRANSFERASE U/L 146*   < > 123* 

ASPARTATE AMINOTRANSFERASE U/L 245*   < > 213* 

GLUCOSE mg/dL  --   --  83 

 < > = values in this interval not displayed. 

No lab components to display

Most Recent Result from last 24 hours 

Lab Units 21

1200 

APTT Sec 30 

INR  1.1 



Microbiology:

No results found for this or any previous visit (from the past 168 hour(s)).

Imaging:

CT Chest wo contrast



Result Date: 2021

1.  Diffuse interstitial edema.

2.  Posterior relaxation and scattered subsegmental atelectasis. No

pulmonary mass or confluent consolidation.

3.  Cardiomegaly. Heavy coronary artery calcifications. Aortic valve

leaflet calcifications could represent calcific aortic stenosis.

4.  Small bilateral pleural effusions with features of partial

loculation on the right. No pneumothorax.

5.  Small upper abdominal ascites.



READING SITE: [facility]



CT Head wo contrast



Result Date: 2021

Impression: Patient motion artifact and suboptimal positioning degrades

image quality. 



1. Mixed attenuation left holohemispheric likely subacute on chronic

subdural hematoma which measures up to 6 mm in maximal thickness. No

significant mass effect or discrete midline shift given suboptimal

patient positioning. Similar ventriculomegaly when compared to outside

CT.

2. Gray-white loss is identified within the right frontotemporal lobes

and right insula likely representing a subacute right MCA territory

infarct.

3. Right parietal approach ventricular shunt catheter is identified with

the distal intracranial and terminating near the foramen of Cook. The

partially visualized ventricular shunt catheter tubing and port appear

intact where visualized. 

4. Postprocedural changes of prior aneurysm coiling are identified

possibly involving a basilar tip aneurysm or PCOM aneurysm given

location.



------------------------------------------------------

PRELIMINARY REPORT



This examination has not been reviewed by a staff radiologist.

A final report will be issued after staff review.



-----------------------------------------------------------



READING SITE: [facility].



XR Abdomen single view AP



Result Date: 2021

1.  Enteric tube coiled in the proximal stomach with sidehole near the

cardia and tip near the GE junction.

2.  Nonobstructive bowel gas pattern.

3.  Heterogeneous opacities in the right midlung zone. Recommend

attention on ordered follow-up CT.



COMMUNICATION: Finding of tube position was verbally communicated and

acknowledged via telephone to Angie Boles RN, at 2021 10:50 AM.



ATTESTATION STATEMENT: Staff radiologist has personally reviewed the

images and dictated, reviewed and/or edited the final report.



READING SITE: [facility]



XR Chest single view frontal



Result Date: 2021



1. Abnormal curvilinear lucency at the left lateral lung base and

costophrenic angle with apparent deep sulcus. Although no definitive

pleural line is noted, findings are equivocal for a small pneumothorax

given history of trauma. Contrast-enhanced chest CT is recommended for

further evaluation.



2. No focal airspace disease.



Results were communicated by telephone to patient's nurse Angie by Dr. Wali Garrett on 2021 at 9:56 AM.



READING SITE: [facility]



ATTESTATION STATEMENT:

The Staff Radiologist has personally reviewed this study and agrees with

the findings in this report.



XR Chest single view frontal



Result Date: 2021

No acute chest abnormality. 



READING SITE: [facility]



THIS DOCUMENT HAS BEEN ELECTRONICALLY SIGNED BY ALEX GONCALVES MD



XR Pelvis one or two views



Result Date: 2021

Negative for acute pelvic fracture. 



READING SITE: [facility]



THIS DOCUMENT HAS BEEN ELECTRONICALLY SIGNED BY ALEX GONCALVES MD



Echo Complete with Doppler and Color Flow



Result Date: 2021

  1. Normal left ventricular systolic function, with an estimated ejection fract
ion of 60%. 

  2. Normal right ventricular size and systolic function. 

  3. No significant valvular abnormalities. 

  No previous study available for comparison. 



  Dr Johnny Benavidez

  (Electronically Signed)

  Final Date:    2021 12:38



Medications:

Scheduled:

cefTRIAXone, 2 g, Daily

famotidine, 20 mg, Q12H

 Or

famotidine, 20 mg, Q12H

ipratropium-albuteroL, 3 mL, 4x daily

methylPREDNISolone sodium succinate, 40 mg, Q8H MARQUISE

senna-docusate, 1 tablet, BID



PRN:

dextrose 50%, 25-50 mL, PRN * glucagon, 1 mg, PRN **OR** glucagon, 1 mg, PRN * g
lucose, 16-32 g, PRN * hydrALAZINE, 10-20 mg, Q2H PRN * labetaloL, 10-20 mg, Q2H
 PRN * ondansetron, 4 mg, Q6H PRN * prochlorperazine, 2.5-5 mg, Q4H PRN **OR** p
rochlorperazine, 2.5-5 mg, Q4H PRN **OR** prochlorperazine, 25 mg, Q12H PRN * so
dium chloride 0.9%, 250 mL, Once PRN * sodium chloride 0.9%, 250 mL, Once PRN



Infusions:

 dexmedeTOMIDINE 0.4 mcg/kg/hr (21 1237) 

 sodium chloride 0.9 % 100 mL/hr (21 0111) 

 



Multidisciplinary:

   Code Status: DNR

   Diet: Diet NPO

   GI Prophylaxis:  na

   VTE Prophylaxis:  SCDs

   :  Castellano for accurate I/Os

   Bowel regimen: Yes

   PT/OT/SLT/SW: PT, OT and ST

   Tertiary complete: No, Reassessment needed w/ improved LOC

   Cervical spine clearance:

   T & L spine clearance: 



Procedures/Surgeries:

None



Active Hospital Problems 

 Diagnosis 

 *Encephalopathy 

 UTI (urinary tract infection) 

 SDH (subdural hematoma) (HCC) 

 History of cerebral aneurysm 

 S/P  shunt 

 COPD (chronic obstructive pulmonary disease) (HCC) 

 Bronchitis 

 Elevated INR 

 GREGORY (acute kidney injury) (HCC) 

 Hypernatremia 

 Other specified anemias 

 Elevated troponin 

 Acute pain due to trauma 

 Coagulopathy (HCC) 

 Acute encephalopathy 

 Chronic respiratory failure with hypoxia (HCC) 

 COPD with acute exacerbation (HCC) 

 Leukocytosis 

 Anemia 

 Transaminitis 

 Hx of hepatitis C 



ASSESSMENT/PLAN:

Neurologic:

Acute pain due to trauma

- Continue multimodal pain regimen



Subacute on chronic SDH vs hygroma

Acute encephalopathy, possibly hepatic or multifactorial (toxic, hepatic, posttr
aumatic)

Hx cerebral aneurysm w/ coil

 shunt

- vEEG, ammonia, lactate

- Continue Precedex infusion for agitation/combativeness 

- Neurosurgery consulted

 - No neurosurgical interventions indicated

 - Obtain skull XR

 - Recommend further evaluation for causes of AMS

 - Recommend continuing to correct his coagulopathy and maintain his INR less th
an 1.4

 - Recommend holding SQH until coagulopathy has been corrected



Cardiovascular:

Elevated troponin

- Continue to trend troponin and EKG per protocol

- Echocardiogram ordered

- Continue PRN IVP hydralazine & labetalol for SBP > 160

- Continue to monitor hemodynamics w/ goal SBP < 160 & goal MAP > 65



Pulmonary:

COPD w/ exacerbation

Chronic hypoxic respiratory failure (baseline home supplemental oxygen)

- Continue Duo Nebs QID, methylprednisolone 40mg Q8h, 

- Titrate supplemental oxygen to maintain oxygen saturation >/= 90%

- Continue pulmonary hygiene w/ IS 10x/hr while awake, TCDB



Renal/Genitourinary:

GREGORY vs CKD

Hypernatremia

- Continue maintenance IVF at this time

- Continue castellano catheter for accurate I/O, evaluate for indication for removal 
daily

- Continue to monitor accurate I/O

- Continue to monitor electrolytes and replace as indicated



FEN/Gastrointestinal:

- NPO



GI prophylaxis

- Continue bowel regimen to prevent constipation, hold for multiple loose stools



Transaminitis

Hx Hep C



Endocrine:

No hx DM, glucose monitoring and management per protocol



Hematology/Oncology:

Coagulopathy w/ elevated INR

Leukocytosis

Anemia

- Continue to trend and transfuse as indicated

- Continue to monitor CBC, s/s hemorrhage, s/s infection



VTE prophylaxis:

- Continue SCD's

- Pharmacological ppx contraindicated d/t coagulopathy



Infectious Disease:

UTI

COPD exacerbation

- Continue ceftriaxone IV

- S/p azithromycin

- Continue to monitor fever, WBC, and s/s infection



Integumentary/Musculoskeletal:

BLE wounds

- Wound care consulted, appreciate recs



Trauma pt w/ AMS

- Pan scan pt to evaluate for other traumatic injuries/cause of ABLA



- Continue to monitor skin condition and assess for wounds, continue Q2h turns o
r encourage frequent position changes

- Encourage out of bed as tolerated

- Head of bed to 30 - 45 degrees

- Continue PT/OT as indicated



Incidental Findings:

- None



Disposition/Family:

- Continue ICU status at this time, appreciate nursing and staff

- ICU care per Neuro critical care team, appreciate assistance

- Home medications reviewed and restarted/continued as appropriate

- Appreciate assistance of care coordination



PPE Statement: Thomas Das, GAYATHRI used Yellow precautions (Level 3 mask, eye prot
ection, and gloves).



During rounds today the patient events of the previous 24 hours, physical exam, 
laboratory findings, radiologic studies, fluid balance,neurologic status, cardio
vascular status including invasive monitoring data, pulmonary status, and metabo
lic status including nutrition, and medications were reviewed. Catrachito Bowens
r remains in ICU after being found down at home. Pt continues to require monitor
ing of neurologic, hemodynamic, pulmonary, infectious, and pain management statu
s for signs of recovery and/or decline.  



Education on injuries, trajectory of hospitalization, recovery, medications, adilia
atment goal, and plan of care were discussed and mutually agreed upon with patie
nt/family. 



I, Thomas Das have reviewed all of the above findings and the overall assessmen
t and plans for the day are discussed and documented in the Medical Record Note.
 I was personally present and involved in all aspects of patient care.  Time spe
nt, not including procedures 35.



Thomas Das, MSN, RN, APRN, AGACNP-BC [facility] Nurse Practitioner

Available on [employer]

Mobile [phone]

Pager [phone]



Electronically signed by Thomas Das NP at 2021  1:10 PM CST

* JESSICA Mejía - 2021 12:34 AM CST



Formatting of this note is different from the original.

  Critical Care Progress Note  



PATIENT NAME: Catrachito Angel

DATE of SERVICE: 2021 

CPI: [id number]

AGE: 71 y.o.

: 1950



CHIEF COMPLAINT: Altered mental status 



DATE OF PROCEDURES: none



HOSPITAL COURSE: Due to patient arriving with altered mental status, primary med
ical information obtained from medical records/family report at bedside. Mr. Darcie paredes is a 70 yo male with a PMHx of COPD requiring baseline O2 supplement, H
x of cerebral aneurysm s/p coil, and Hx of  shunt placement. He presented to [facility] ED via EMS on  when neighbor reportedly found him unconscious in his miguel
e with his home oxygen off. Per family report, patient had been declining over t
he last month significantly. CT head with chronic Left SDH measuring 3 mm and de
monstrates  shunt. Decision made to transfer to [facility] ED for comprehensive traum
a w/u. On arrival to ED, he was hypertensive and started on cardene infusion for
 SBP < 160 mmHg. He was subsequently admitted to Neurosurgical ICU for further 
management. 



PAST MEDICAL HISTORY: 

Past Medical History: 

Diagnosis Date 

 Cerebral aneurysm  

 COPD (chronic obstructive pulmonary disease) (HCC)  



PAST SURGICAL HISTORY:

Past Surgical History: 

Procedure Laterality Date 

 VENTRICULOPERITONEAL SHUNT   



SOCIAL HISTORY:

Social History 



Socioeconomic History 

 Marital status: Single 

  Spouse name: Not on file 

 Number of children: Not on file 

 Years of education: Not on file 

 Highest education level: Not on file 

Occupational History 

 Not on file 

Tobacco Use 

 Smoking status: Not on file 

 Smokeless tobacco: Not on file 

Substance and Sexual Activity 

 Alcohol use: Not on file 

 Drug use: Not on file 

 Sexual activity: Not on file 

Other Topics Concern 

 Not on file 

Social History Narrative 

 Not on file 



Social Determinants of Health 



Financial Resource Strain:  

 Difficulty of Paying Living Expenses: Not on file 

Stress:  

 Feeling of Stress : Not on file 

Intimate Partner Violence:  

 Fear of Current or Ex-Partner: Not on file 

 Emotionally Abused: Not on file 

 Physically Abused: Not on file 

 Sexually Abused: Not on file 



FAMILY HISTORY:

History reviewed. No pertinent family history.



ALLERGIES:

Patient has no known allergies.



PRIOR TO ADMISSION MEDICATIONS:

No medications prior to admission. 



24-HOUR HISTORY/EVENTS OF NOTE:

Admit to NSICU for close neuro monitoring. 



ROS:

ROS unobtainable because patient with AMS. 

               

PHYSICAL EXAM:

Vitals:

BP (!) 184/158 Comment: MD Veronica aware of elevated BP | Pulse (!) 113 Co
mment: MD Veronica aware of elevated BP | Temp 37 C (98.6 F) (Axillary)
  | Resp 26 Comment: MD Veronica aware of elevated BP | Wt 73.1 kg (161 lb 
2.5 oz)  | SpO2 97% Comment: MD Veronica aware of elevated BP



Respiratory Support: 

O2 Device: Nasal cannula

O2 Flow Rate (L/min): 4 L/min 

  



T-High:

Temp (24hrs), Av.9 C (98.4 F), Min:36.7 C (98.1 F), Max:37 C (98.6
 F)



Fluid Balance:

No intake/output data recorded.



General Appearance:    Lying in bed, no acute distress 

Neurologic:  Arousable with noxious stimuli. Unable to assess speech or sensatio
n 2/2 AMS. Responds to noxious stimuli. No facial droop present. Moves all extre
mities spontaneously.  

HEENT:  Eyes: Pupils  mm, equal, round and reactive to light. EOMs intact withou
t nystagmus. Sclera white, no edema. Visual fields intact. Head: normocephalic, 
atraumatic. Neck: Trachea midline. No JVD. Throat/Mouth: oral mucosa pink, no le
sions 

Lungs:     Diffuse expiratory wheezing to auscultation bilaterally, no accessory
 muscle use 

 Heart:    Regular rate and rhythm, S1/S2, no murmur, no rub 

Abdomen:     Soft, non-tender, bowel sounds active all four quadrants 

Genitourinary:    Deferred 

Extremities:   Extremities with full active ROM, no edema 

Pulses/Perfusion:   2+ pulses radial and dorsalis pedis, warm and well perfused 

                               Skin:   No rashes or lesions 

                 Surgical Site:  None 



LAB RESULTS:

No lab components to display

Most Recent Result from last 24 hours 

Lab Units 21

2325 

SODIUM mEq/L 147* 

POTASSIUM mEq/L 4.6 

CARBON DIOXIDE mEq/L 29 

BLOOD UREA NITROGEN mg/dL 44* 

CREATININE mg/dL 1.40* 

GLUCOSE mg/dL 83 

CALCIUM mg/dL 8.8 



Most Recent Result from last 24 hours 

Lab Units 21

2325 

PROTEIN TOTAL SERUM g/dL 5.9 

ALKALINE PHOSPHATASE U/L 62 

ALANINE AMINOTRANSFERASE U/L 123* 

ASPARTATE AMINOTRANSFERASE U/L 213* 

GLUCOSE mg/dL 83 



No lab components to display

Most Recent Result from last 24 hours 

Lab Units 21

2249 

APTT Sec 43* 

INR  2.7* 



ABG:

Most Recent Result within the last 7 days 

Lab Units 21

2325 

PH ARTERIAL units 7.31* 

PCO2 ARTERIAL mmHg 60* 

PO2 ARTERIAL mmHg 90 

BICARBONATE mEq/L 30.2* 



Cultures:

No results found for this visit on 21.



ECHOCARDIOGRAPHY:

No results found.



RADIOLOGY/IMAGING:

CT Head wo contrast



Result Date: 2021

Impression: Patient motion artifact and suboptimal positioning degrades

image quality. 



1. Mixed attenuation left holohemispheric likely subacute on chronic

subdural hematoma which measures up to 6 mm in maximal thickness. No

significant mass effect or discrete midline shift given suboptimal

patient positioning. Similar ventriculomegaly when compared to outside

CT.

2. Gray-white loss is identified within the right frontotemporal lobes

and right insula likely representing a subacute right MCA territory

infarct.

3. Right parietal approach ventricular shunt catheter is identified with

the distal intracranial and terminating near the foramen of Cook. The

partially visualized ventricular shunt catheter tubing and port appear

intact where visualized. 

4. Postprocedural changes of prior aneurysm coiling are identified

possibly involving a basilar tip aneurysm or PCOM aneurysm given

location.



------------------------------------------------------

PRELIMINARY REPORT



This examination has not been reviewed by a staff radiologist.

A final report will be issued after staff review.



-----------------------------------------------------------



READING SITE: [facility].



XR Chest single view frontal



Result Date: 2021

No acute chest abnormality. 



READING SITE: [facility]



THIS DOCUMENT HAS BEEN ELECTRONICALLY SIGNED BY ALEX GONCALVES MD



XR Pelvis one or two views



Result Date: 2021

Negative for acute pelvic fracture. 



READING SITE: [facility]



THIS DOCUMENT HAS BEEN ELECTRONICALLY SIGNED BY ALEX GONCALVES MD



MEDICATIONS:

azithromycin, 500 mg, Daily

famotidine, 20 mg, Q12H

 Or

famotidine, 20 mg, Q12H

ipratropium-albuteroL, 3 mL, 4x daily

methylPREDNISolone sodium succinate, 40 mg, Q8H MARQUISE

phytonadione ((AQUA-MEPHYTON) IVPB, 10 mg, Once

senna-docusate, 1 tablet, BID



INFUSIONS:

 sodium chloride 0.9 % 100 mL/hr (21 9195) 



ICU BEST PRACTICE:

 CODE STATUS: FULL CODE

 LOS: 1

 

 DELIRIUM PRESENT:  No

 SEDATION VACATION: N/A

 SBT: N/A

 

 DIET: NPO, strict



 GI PROPHYLAXIS: not indicated at this time 

 GLYCEMIC CONTROL: No

 VTE PREVENTION: SCDs

 

 CASTELLANO: No Castellano

 LINES: PIV x2

 DRAINS: none 

 AIRWAY: Not Intubated



 ANTIBIOTIC REVIEW: Yes

 MAR/HOME MED REVIEW: Yes

 BOWEL REGIMEN: Yes

 BM LAST 48 HRS: N/A



 THERAPIES: PT, OT and ST

 MOBILITY:  PT and OT



 PPE Statement: JESSICA Mejía used Yellow precautions (Level 1 mask worn 
over level 3 mask, eye protection, and gloves). 



DIAGNOSIS:

Neuro:

Chronic Left sided SDH 

Altered mental status 

Hx of cerebral aneurysm with coil

 shunt  



Pulm:

COPD requiring baseline oxygen supplementation 

Bronchitis 



HEME:

Elevated INR  



Renal:

Acute Kidney Injury vs CKD

Hypernatremia  



ID:

Urinary tract infection - Ceftriaxone D1  



PLAN: 

Goal SBP < 160 mmHg

Labetalol 10-20 mg IV PRN q2h

Hydralazine 10-20 mg IV PRN q2h

Ceftriaxone 1 g for 5 days 

NS 0.9% @ 100 mL/hr 

Solumedrol 40 mg Daily  

Duoneb treatment q6h

Vit K 10 mg IV 

FFP transfusion 2 units 

Bowel regimen 

PRN pain regimen 

PRN antiemetics 

Labs - BMP, CBC, procal, CK, glucose, UA, tox screen 

F/u CT head @ 0500

Consult NSGY

PT/OT

Oxygen supplementation 

SCDs for VTE prophylaxis



I personally reviewed the patient events of the previous 24 hours, physical exam
, laboratory findings, radiologic studies including images, fluid balance, neuro
logic status, cardiovascular status, pulmonary status, metabolic status includin
g nutrition, and medications discussed with Dr. Sadler. Catrachito Angel is be
ing cared for following a chronic SDH with AMS. Will maintain SBP < 160 mmHg 
with PRN antihypertensives. UA demonstrated UTI, will treat with ceftriaxone and
 give IVF for adequate hydration. On exam he has diffuse pulmonary wheeze, will 
give solumedrol daily and scheduled duoneb treatments for COPD. Additionally, 
will start Azithromycin to cover pulmonary atypical for possible bronchitis. 
Will obtain labs including procalcitonin and leukocytosis for comprehensive 
infectious w/u. Coagulopathy with INR at 2.7, will give 2 units FFP and Vit K IV
 for reversal. No anticoagulation therapy noted on Rx review or chart review. W
ill obtain F/u CT head to reevaluate SDH and consult NSGY for further recommenda
tions for chronic SDH in setting of  drain. Will maintain SCDs for VTE prophyl
axis and started SQH once appropriate. I will continue to closely monitor his ne
urologic status while he remains in NSICU.  



No education provided 2/2 patient mental status. Will update family via phone on
 patient's condition and provide education as well as answer any questions. 



I, JESSICA Mejía, have reviewed all of these findings and the overall ass
essment and plans for the day are discussed and documented in the Medical Record
 Note. I was personally present and involved in all aspects of patient care.



Level 3

JESSICA Mejía  







Electronically signed by David Sadler MD at 2021  4:38 AM CST





Associated attestation - David Sadler MD - 2021  4:38 AM CST



Formatting of this note might be different from the original.

Pt presented after being found down in his apartment.  Small SDH on head CT, how
ever neuro findings most c/w global encephalopathy.  Pt alternates between somno
lence and agitation.   On exam, pt with cloudy urine from Castellano, and wheezing on
 exam.  lab findings with INR 2.7 from unknown etiology.  I am concerned that in
dolent infection may be the underlying etiology of encephalopathy.  Cx obtained 
and abx started.  Covering for both UTI and bronchitis.  Giving vit K and FFP gi
anjali SDH and high INR.  As patient is wheezing on exam, will treat for possible C
OPD exacerbation with steroids and duonebs.



David Sadler



documented in this encounter



H&P Notes

* Francisco Herrmann MD - 2021 11:13 PM CST



Formatting of this note is different from the original.

Trauma Surgery History and Physical

[facility]



 



 



PATIENT NAME: Catrachito Angel

DATE: 2021

MRN: [id number]

AGE: 71 y.o.

: 1950



 



TIME OF TRAUMA: Today

TRAUMA ACTIVATION: GREEN

TIME OF ACTIVATION: 

TIME OF TEAM ARRIVAL: 

MODE OF ARRIVAL: EMS Transfer



 



HISTORY OF PRESENT ILLNESS:  Catrachito Angel is a 71 y.o. male with history o
f oxygen dependent COPD, prior aneurysm coiling, and  shunt placement who pres
ents as a transfer GREEN Trauma activation s/p SDH and AMS. Per Triage report, lewis davis was found by neighbors unconscious in his home with his oxygen off. Per f
amily, patient "has never been quite right mentally" but has been able to care f
or himself until about a month ago and has declined  Significantly. Family expre
ssed concern of stroke last week but patient refused EMS transport to the ED. OS
H evaluation was significant for SDN, AMS, and anemia Hgb 6.7. On presentation t
o [facility], patient was GCS of 13, somnolent, not cooperative, minimally verbal, and 
not in acute distress. 



PAST MEDICAL HISTORY:

Past Medical History: 

Diagnosis Date 

 Cerebral aneurysm  

 COPD (chronic obstructive pulmonary disease) (HCC)  



PAST SURGICAL HISTORY:

Past Surgical History: 

Procedure Laterality Date 

 VENTRICULOPERITONEAL SHUNT   



 

FAMILY HISTORY:

History reviewed. No pertinent family history.

 



SOCIAL HISTORY:

Social History 



Socioeconomic History 

 Marital status: Single 

  Spouse name: Not on file 

 Number of children: Not on file 

 Years of education: Not on file 

 Highest education level: Not on file 

Occupational History 

 Not on file 

Tobacco Use 

 Smoking status: Not on file 

 Smokeless tobacco: Not on file 

Substance and Sexual Activity 

 Alcohol use: Not on file 

 Drug use: Not on file 

 Sexual activity: Not on file 

Other Topics Concern 

 Not on file 

Social History Narrative 

 Not on file 



Social Determinants of Health 



Financial Resource Strain:  

 Difficulty of Paying Living Expenses: Not on file 

Stress:  

 Feeling of Stress : Not on file 

Intimate Partner Violence:  

 Fear of Current or Ex-Partner: Not on file 

 Emotionally Abused: Not on file 

 Physically Abused: Not on file 

 Sexually Abused: Not on file 



ALLERGIES:

Patient has no known allergies.



 

PRIOR TO ADMISSION MEDICATIONS:

(Not in a hospital admission)



 



REVIEW OF SYSTEMS:

Review of Systems 

Unable to perform ROS: Acuity of condition 

Patient not cooperative, somnolent.

           



PHYSICAL EXAM:

BP (!) 150/135  | Pulse (!) 105  | Temp 36.7 C (98.1 F) (Axillary)  | Resp 2
4  | SpO2 98% 



 

PRIMARY SURVEY:

Airway: intact,: patent

Breathing: equal and symmetrical chest expansion, spontaneous

Circulation: +2 pulses: radial

Disability: GCS 13 E 4, V 4, M 5

 

Resuscitation: None



SECONDARY SURVEY:

HENT: Pupils: L 3mm R unable to assess; L DORIS; Csp: unable to assess; Trachea: 
no deviation; JVD: not observed; Face: atraumatic; Wounds: none

Chest: NTTP, CTA; wounds: none

Back: NTTP, ND; wounds: none

Abdomen: soft, NTTP, BS: 4 quadrants, wounds: none

Pelvis: NTTP, ND

: normal, Castellano: none

JAVID: tone intact, no heme

Extremity: RUE: 4/5, pulses: +2; LUE: 4/5; pulses: +1; RLE: 5/5; pulses: unable 
to assess;  LLE: 5/5; pulses: unable to assess; NGUYEN; 5/5 Motor; Sensation: intac
t

 



FRAIL Screen J Nutr Health Aging 2012;16(5)871

Unable to assess

 

Screen For Ask  

Fatigue Are you fatigued throughout the day?  

Resistance Are you unable to walk up a flight of stairs?  

Ambulation Are you unable to walk a block?  

Illness Does the patient have 5 or more of the following:



Hypertension

Diabetes Mellitus

Cancer (other than minor skin cancer)

Chronic lung disease

H/o Myocardial Infarction

Congestive Heart Failure

Angina

Asthma

Arthritis

H/o stroke

Chronic Kidney Disease  

Loss of Weight  Have you lost weight unexpectedly in the past 6 mos?



Or Has Epic documented >5% wt loss in 6 months?  

  Total Yes: 

Any yes or + for dementia = Use frailty order set

1-2 yes = pre-frail

3-5 yes = frail 

          

General Appearance: Somnolent, uncooperative, no distress

Neurologic: unable to assess.

Neck: Supple, symmetrical, trachea midline

Respiratory: Lungs clear to auscultation bilaterally

Heart: Regular rate and rhythm, S1 and S2 normal, no murmur

Abdomen: Soft, non-tender, bowel sounds active all four quadrants, no masses, no
 organomegaly

Back:  No C/T/L-spine TTP.  No step-offs

Extremities: Extremities normal, no edema

Skin: No rashes or lesions

Psychiatric: unable to assess



 

LAB RESULTS

Last CBC: 

No lab components to display



Last BMP:

Most Recent Result within the last 7 days 

Lab Units 21

2249 

SODIUM mEq/L 147* 

POTASSIUM mEq/L 5.0 

CARBON DIOXIDE mEq/L 28 

BLOOD UREA NITROGEN mg/dL 43* 

CALCIUM mg/dL 8.9 



Last CMP:

Most Recent Result within the last 7 days 

Lab Units 21

2249 

SODIUM mEq/L 147* 

POTASSIUM mEq/L 5.0 

CARBON DIOXIDE mEq/L 28 

BLOOD UREA NITROGEN mg/dL 43* 

CALCIUM mg/dL 8.9 

PROTEIN TOTAL SERUM g/dL 5.8 

ALKALINE PHOSPHATASE U/L 62 

ALANINE AMINOTRANSFERASE U/L 123* 

ASPARTATE AMINOTRANSFERASE U/L 217* 



 

No results found for: PHOS, HGB, HGBA1C

No lab components to display

Most Recent Result within the last 7 days 

Lab Units 21

2249 

APTT Sec 43* 

INR  2.7* 



RADIOLOGY:

XR Chest single view frontal



Result Date: 2021

No acute chest abnormality. READING SITE: [facility] THIS DOCUMENT HAS B
EEN ELECTRONICALLY SIGNED BY ALEX GONCALVES MD



XR Pelvis one or two views



Result Date: 2021

Negative for acute pelvic fracture. READING SITE: [facility] THIS DOCUME
NT HAS BEEN ELECTRONICALLY SIGNED BY ALEX GONCALVES MD



ASSESSMENT:

Catrachito Angel is a 71 y.o. male with history of oxygen dependent COPD, prio
r aneurysm coiling, and  shunt placement who presents as a transfer GREEN Trau
ma activation s/p SDH and AMS. Per Triage report, patient was found by neighbors
 unconscious in his home with his oxygen off. Per family, patient has declined s
ignificantly in the past month and is not able to care for himself like he used.
 Patient had a possible stroke last week but refused EMS transport to the ED. OS
H evaluation was significant for SDH, AMS, and anemia Hgb 6.7. On presentation t
o [facility], patient was GCS of 13, somnolent, not cooperative, minimally verbal, and 
not in acute distress. Patient was hypertensive 150s-170s, tachycardic in 100s, 
and tachypnic. On supplemental O2 saturating at 98%.



There are no active hospital problems to display for this patient.



PLAN:

-Admit to Neuro ICU

-Neurosurgery consulted

-CT Head STAT pending

-Repeat CT Head at 5AM

-Anemic Hgb 6.7 on presentation, repeat H/H pending.

-Diet: Keep NPO

-DVT ppx: SCD,hold chem ppx

-PT/OTwhen appropriate



Stella Rendon MD PGY-1

General Surgery

Staff: Dr. Herrmann



 

 --------------------------------------------------

[facility]

Surgical Services History and Physical

TRAUMA STAFF



PAGED:[id number] 2021

MD ARRIVED:2021

LEVEL OF ACTIVATION:GREEN

TIME OF ACCIDENT:UNKNOWN

MECHANISM:POSSIBLE GROUND LEVEL FALL. 



I have seen and examined this patient with the resident. I agree with the above 
resident's note, physical exam, assessment, and plan of care. 



Patient is a 71-year-old gentleman who was reportedly found by his neighbors at 
his home with altered mental status.

Patient by report was noted by neighbors about a week ago to be confused, with s
lurred speech, intermittently somnolent.  Concern was raised for possible stroke
 and patient was advised to seek medical attention.  Patient declined to see jose carlos
de attention.  Patient does have a known history of COPD.

Apparently neighbors were able to get into his house, found him to have altered 
mental status, and EMS was called.  Patient was taken to [facility].  Patient had a CT of his head which showed the presenc
e of a right ventricular peritoneal shunt, and a small left parietal acute on ch
ronic subdural hematoma.  There was no mass-effect or any acute stroke that was 
seen.  Patient was felt to have a Magan Coma Scale of approximately 13.  Elena
ion was made to transfer to [facility] for further care.

Patient by report has had previous aneurysm coiling.

Patient is unable to provide further history to details of what happened to him.
  There is no definite history of trauma.



PHx per above. 



10 point review of systems was negative except as mentioned above. 



Pt. Awake, confused, intermittently combative.

Head: No focal tenderness.  No external signs of trauma.

Neck: NT, FROM

Chest: good bilateral breath sounds. No focal chest wall tenderness. 

Cardiac: RRR

Abd: soft, NT. 

Pelvis: stable, non tender. 

Back: Non-tender. No abnormalities. 

Ext: No deformities. 

     -good 2+ bilateral pedal pulses. 

Neuro: Moves all four extremities well. Normal sensation UE's, and LE's. 

     -GCS-eyes 4, verbal 3, motor 6-13



CT head: Small left parietal acute on chronic subdural hematoma, 4 mm in size.

     -Right  shunt in place.

Chest x-ray: No acute abnormality

Pelvis: No acute abnormality



ECG: Sinus tach, PVCs.



Active Hospital Problems 

 Diagnosis 

 *UTI (urinary tract infection) 

 Other specified anemias 

 SDH (subdural hematoma) ([facility]) 

 Altered mental status 

 History of cerebral aneurysm 

 S/P  shunt 

 COPD (chronic obstructive pulmonary disease) ([facility]) 

 Bronchitis 

 Elevated INR 

 GREGORY (acute kidney injury) ([facility]) 

 Hypernatremia 

Anemia



PLAN:

Patient presenting with altered mental status, with Glascow coma scale of 13.

Patient with a small left parietal acute on chronic subdural hematoma, not expla
ining the patient's altered mental status.

No definite history of trauma.

Confusion and altered mental status has been ongoing for the past week.



Patient admitted to the neurosurgical ICU.

Serial neuro checks, physical exam, and labs.

Neurosurgery consultation has been requested from Dr. Ryan, who is aware.  
Routine consultation.

Discussed patient with Dr. Sadler, neurosurgical ICU staff.

Patient will have follow-up CTs of his head.

Patient will receive blood transfusion for anemia.



PPE Statement: Francisco Herrmann MD used Yellow precautions (Level 1 mask worn over
 level 3 mask, eye protection, and gloves).



Electronically signed by Francisco Herrmann 2021 5:46 AM

[phone]







Electronically signed by Francisco Herrmann MD at 2021  6:08 AM CST

documented in this encounter



Procedure Notes

* Alysha Tsai MD - 2021  4:42 PM CST



Procedure(s): LUMBAR PUNCTURE W/O FLUORO



Pre-Procedure Diagnose(s): Encephalopathy



Post-Procedure Diagnose(s): Encephalopathy



Formatting of this note might be different from the original.

Lumbar Puncture Procedure Note



Pre-operative Diagnosis: Enephalopathy



Post-operative Diagnosis: Same 



Indications: Diagnostic



Procedure Details 



Consent: Informed consent was obtained. Risks of the procedure were discussed in
cluding: infection, bleeding, pain and headache.



The patient was positioned under sterile conditions. Betadine solution and steri
le drapes were utilized. A spinal needle was inserted at L3/L4 interspace. Multi
ple passes was attempted but was unsuccessful in obtaining CSF 



Complications: None

      

Condition: Stable

Plan

IR consult for LP under Fluoroscopy 







Electronically signed by Alysha Tsai MD at 2021 11:26 AM CST

* Sebastian Sorto MD - 2021  4:06 PM CST



Associated Order(s): EEG PROLONGED (> 60 MIN)



Formatting of this note is different from the original.

NEURODIAGNOSTICS - EEG Report



DATE OF STUDY: 2021



TYPE OF EEG:  Prolonged



LENGTH OF EE min



REFERRING PROVIDER:  

GIA Hernandez



INTERPRETING PHYSICIAN:  

Sebastian Sorto M.D., M.S.



INDICATION:  

Evaluate for seizures.



MEDICATIONS:

 cefTRIAXone (ROCEPHIN) injection 2 g 

 dexmedeTOMIDINE (PRECEDEX) 4 mcg/mL infusion 100 mL (premix) 

 dextrose 50% (D50W) syringe 25-50 mL 

 famotidine (PEPCID) tablet 20 mg 

 Or 

 famotidine (PEPCID) injection 20 mg 

 glucagon (GLUCAGEN) injection 1 mg 

 Or 

 glucagon (GLUCAGEN) injection 1 mg 

 glucose chewable tablet 16-32 g 

 hydrALAZINE (APRESOLINE) injection 10-20 mg 

 ipratropium-albuteroL (DUO-NEB) 0.5-3 mg/3 mL nebulizer solution 3 mL 

 labetaloL (NORMODYNE,TRANDATE) injection 10-20 mg 

 lidocaine (pf) (XYLOCAINE-MPF) 20 mg/mL (2 %) injection 1-10 mL 

 methylPREDNISolone sod suc(PF) (SOLU-Medrol) injection 40 mg 

 ondansetron (ZOFRAN) injection 4 mg 

 prochlorperazine (COMPAZINE) injection 2.5-5 mg 

 Or 

 prochlorperazine (COMPAZINE) injection 2.5-5 mg 

 Or 

 prochlorperazine (COMPAZINE) suppository 25 mg 

 senna-docusate (PERICOLACE) 8.6-50 mg 1 tablet 

 sodium chloride 0.9% (NS) IV Bolus 

 sodium chloride 0.9% (NS) IV Bolus 

 sodium chloride 0.9% infusion 



FINDINGS:  



ABNORMALITY #1:

Background rhythm shows generalized delta frequency slowing in a 1 to 4 Hz frequ
ency range.  Intermixed faster theta frequency components can occur more promine
ntly over the central regions that at times can have poorly formed sleep morphol
ogies.



BACKGROUND:  

As above.



HYPERVENTILATION: 

Not performed.



INTERMITTENT PHOTIC STIMULATION: 

Not performed.



SLEEP:  

Normal transitions of wakefulness and sleep are not identified.



CARDIAC MONITOR:  

Shows a normal sinus rhythm. 



IMPRESSION:  

This is an abnormal prolonged EEG due to the presence of generalized slowing thr
oughout the recording.  This finding suggest midline subcortical dysfunction lik
ely corresponding to encephalopathy.  No clear epileptiform activity is identifi
ed.







Electronically signed by Sebastian Sorto MD at 2021  4:15 PM CST

documented in this encounter



Consult Notes

* Rosamaria Man MD - 2021 11:40 AM CST



Associated Order(s): IP CONSULT TO NEUROLOGY



Formatting of this note is different from the original.

[facility] Neurologist:



I personally interviewed and examined Mr. Catrachito Angel. I discussed the fi
ndings with GIA Cade and reviewed her note. I agree with the findi
ngs with exceptions noted. 



Mr. Angel is a 71 year-old man with a past history significant for COPD, h
epatitis C and basilar tip aneurysm rupture with SAH causing hydrocephalus and V
P shunt placement.  Patient is unable to provide any history but was admitted fo
r evaluation of confusion.  He has been noted to have a slow decline over the pa
st few months.  Family found him confused 9 days ago and he was taken to an [facility] hospital.  He was found to have a chronic subdural and was transferred to [facility] for evaluation.  He has remained altered and neurology is now asked to 
evaluate him.



Objective findings and Assessment/Plan are as follows:



Physical Exam:

Vital Signs: /65  | Pulse 82  | Temp 36.4 C (97.6 F) (Axillary)  | Res
p 15  | Ht 1.676 m (5' 6")  | Wt 80.6 kg (177 lb 11.1 oz)  | SpO2 98%  | BMI 28.
68 kg/m 



GEN:      Anxious and agitated appearing; pale and generally unkempt.  

NEURO:  Mental status:  The patient is awake and alert.  He focused on my face i
nitially just to the right, but with continued coaxing, also looked to the left 
and focused on nursing staff.  His speech is mostly unintelligible.  He repeated
 "You're the one" and appeared anxious, trying to move his legs off the side of 
the bed

Cranial nerves:  EOM's appear full without nystagmus.  Pupils are reactive symme
trically.  Unable to test visual fields although he focuses on objects to his le
ft and right.  The face is symmetric.  He did not open his mouth to evaluate the
 tongue or palate.

Motor:  He did not cooperate for strength testing, but seemed to be moving all e
xtremities symmetrically.  

Reflexes:  1+ and symmetric throughout

Coordination:  No abnormal tremor or movements noted.  

Gait:  Deferred



Assessment: 

 1)  Encephalopathy

 -  Family reported a decline over the past several months with more noticeabl
e confusion present over the past 9 days

 -  CIWA protocol as family reports finding many empty alcohol bottles in his 
home

 -  Thiamine 500mg IV TID for now

 -  Multivitamin

 -  Will check B12, TSH, RPR

 -  Patient appears dehydrated based on elevated sodium and BUN/creatinine rat
io

 -  Initial ammonia normal, although LFT's elevated.  Will recheck

 -  Attempt MRI brain when patient able to cooperate



Electronically signed by Rosamaria Man 2021 5:01 PM



PPE Statement: Rosamaria Man MD used Yellow precautions (Level 1 mask worn ove
r level 3 mask, eye protection, and gloves).

NEUROLOGY CONSULTATION



Patient Name: Catrachito Angel

: 1950

MRN: [id number]

PCP: Henrik Allison MD



Date of consultation:  2021

Requesting physician/service: Starla Sylvester NP 

Reason for consult: Encephalopathy 



HISTORY OF PRESENT ILLNESS

History is provided via: Spoke to TIBURCIO (cousin Steven Yanez) and his wife on 
the phone. Review of chart 

Visitors at the bedside: none at the time of exam 



Catrachito Angel is a 71 y.o.,  male with a past medical history of COPD- on h
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
rocephalus requiring  shunt, hepatitis C, tobacco and alcohol use. Neurology h
as been consulted for evaluation of persistent encephalopathy. 



Mr. Angel was transferred from [facility] to [facility] on 2021 after being 
found by family with altered mental status. CT head at OSH revealed a chronic gallo
bdural hematoma versus hygroma which measures approximately 4 mm at its widest p
ortion with no mass-effect. He was transferred to [place] for trauma and neurosurg
ical evaluation. 



I spoke to DPSARMAD (cousin Steven and his wife) [facility]. He currently lives in the basement of his parents house (both are 
d) and has for many years. 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 Not  and no children-report for the most
 part he has lived a social isolated life. Denies history of mental health diagn
osis. Report he has had a slow decline over the past few months. Noticed in conv
ersations he would talk about the end of times and "being a chosen one by God." 
 He was also having trouble caring for himself, noticed he was not eating well, 
house was unkept, spent a lot of time on the computer and was not getting out mu
. He didn't show up for [facility] appointments promptly family and neighbors t
o check on him.  On () when they checked on him and he was confused and the
y were concerned he had a stroke so EMS was called but he refused transport. Whe
n they checked on him on () he refused again and they felt he was capable t
o make the decision. When she returned to check on him on () she found him 
naked on the floor in the fetal position not on oxygen. He would respond minimal
ly and speech was incomprehensible, he had been incontinent.  When going through
 his house she found 3 empty bottles of whiskey-unclear how much or how often he
 drinks. She is also concerned that he may be using too much of his hydrocodone,
 his dose was recently increased in October. Denies previous history of seizures
. The last time he was found in this state was when he had his aneurysm.  Family
 reports they know a little about his past medical history, he does not share [facility] about his medical history.



Labs on arrival revealed elevated LFT's, coagulopathy with elevated INR 2.7, BUN
 44, creatinine 1.4, and sodium 147.  Hemoglobin 6.8.  He received FFP, vitamin 
K, and 2 units PRBCs. 



Neurosurgery consulted no interventions indicated. Repeat CT head was stable. ID
 consulted for suspected meningitis.  Multiple attempts by neuro IR for lumbar p
uncture, all attempts failed.  Started empirically on  with IV acyclovir, a
mpicillin, cefepime, and vancomycin to cover for potential bacterial/viral menin
gitis/encephalitis.  He has been afebrile throughout his hospitalization.  WBCs 
12.7>>11.3. 



Prolonged EEG  revealed generalized slowing corresponding to encephalopathy
.  No clear epileptiform activity identified.  No obvious seizure activity per I
CU staff. 



He is confused, agitated, and uncooperative at time of my exam. Moving all extre
mities. Requiring mitts and restraints to keep from pulling at lines. He has rec
eived IV fentanyl and is currently on Precedex infusion for agitation.



REVIEW OF SYSTEMS

Unable to obtain due to current mental status 



PAST MEDICAL HISTORY

Past Medical History: 

Diagnosis Date 

 Cerebral aneurysm  

 COPD (chronic obstructive pulmonary disease) (HCC)  



PAST SURGICAL HISTORY

Past Surgical History: 

Procedure Laterality Date 

 VENTRICULOPERITONEAL SHUNT   



FAMILY HISTORY

History reviewed. No pertinent family history.



No family status information on file. 



SOCIAL HISTORY

Social History 



Socioeconomic History 

 Marital status: Single 

  Spouse name: Not on file 

 Number of children: Not on file 

 Years of education: Not on file 

 Highest education level: Not on file 

Occupational History 

 Not on file 

Tobacco Use 

 Smoking status: Not on file 

 Smokeless tobacco: Not on file 

Substance and Sexual Activity 

 Alcohol use: Not on file 

 Drug use: Not on file 

 Sexual activity: Not on file 

Other Topics Concern 

 Not on file 

Social History Narrative 

 Not on file 



Social Determinants of Health 



Financial Resource Strain:  

 Difficulty of Paying Living Expenses: Not on file 

Stress:  

 Feeling of Stress : Not on file 

Intimate Partner Violence:  

 Fear of Current or Ex-Partner: Not on file 

 Emotionally Abused: Not on file 

 Physically Abused: Not on file 

 Sexually Abused: Not on file 



OUTPATIENT MEDICATIONS

There are no discharge medications for this patient.



CURRENT MEDICATIONS

 dexmedeTOMIDINE Stopped (21 1018) 

 lactated Ringers 100 mL/hr (21 0722) 



 acyclovir  10 mg/kg (Ideal) Intravenous Q12H MARQUISE 

 ampicillin  2 g Intravenous Q6H MARQUISE 

 bisacodyL  10 mg Rectal Daily 

 cefepime  2 g Intravenous Q12H MARQUISE 

 famotidine  20 mg Oral Q12H 

 Or 

 famotidine  20 mg Intravenous Q12H 

 guanFACINE  2 mg Per NG tube BID 

 haloperidol lactate  5 mg Intravenous Once 

 insulin lispro  2-7 Units Subcutaneous Q6H MARQUISE 

 ipratropium-albuteroL  3 mL Inhalation 4x daily 

 senna-docusate  2 tablet Oral BID 

 [START ON 2021] thiamine  100 mg Intravenous Daily 

 Or 

 [START ON 2021] thiamine  100 mg Oral Daily 

 vancomycin  1,000 mg Intravenous Q12H 



alteplase, dextrose 50%, flumazeniL, glucagon **OR** glucagon, glucose, hydrALAZ
INE, lipase-protease-amylase **AND** sodium bicarbonate, LORazepam **OR** LORaze
luis, ondansetron, prochlorperazine **OR** prochlorperazine **OR** prochlorperazi
ne, sodium chloride 0.9%



ALLERGIES

No Known Allergies



EXAMINATION

Patient Vitals for the past 4 hrs:

 BP Pulse Resp SpO2 

21 1132  76 18 98 % 

21 1115 136/68 78 14 97 % 

21 1100  88 24 96 % 

21 1000 (!) 143/73 71 17 98 % 

21 0900 135/52 87 (!) 31 96 % 

21 0800 101/81 77 23 98 % 

21 0747  92 26 97 % 



Systolic (24hrs), Av , Min:101 , Max:160 



Diastolic (24hrs), Av, Min:44, Max:106



Ht Readings from Last 1 Encounters: 

21 1.676 m (5' 6") 



Wt Readings from Last 1 Encounters: 

21 80.6 kg (177 lb 11.1 oz) 



Body mass index is 28.68 kg/m.



Neuro exam limited due to current mental status 

General:

 Mr. Angel is a thin ill appearing male in no acute distress. 

 Head:  Oropharynx dry and cracked.  Head normocephalic, atraumatic. 

 Cardiac:  Regular rate and rhythm.  

 Lungs: Coarse lung sounds bilaterally  

 Abdomen: soft, non-tender with + BS. NG tube in place 

 Extremities:  No cyanosis or edema.  No clear skin lesions noted. 



Mental status, cognition, and cortical functions:

 Mental status: Sleepy but awakens to voice. Restless,. Does not follow comman
ds or answer questions. Speech is incomprehensible. 



Cranial nerves:

 Pupils are equal, round, and reactive to light. 

 Visual fields-blinks to direct threat

 Will focus on examiner and track room appropriately at times

 Gaze is conjugate

 Muscles of facial expression are symmetric at rest 

 Hearing is intact to conversation. 

 Tongue is midline

Motor:

 Muscle bulk is thin throughout. 

 Muscle tone is normal throughout. 

 Moves all extremities spontaneously



Reflexes (right/left):

 Biceps:  2/2 

 Triceps:  2/2 

 Brachioradialis:  2/2 

 Patellae:  2/2

 Plantar:  Upgoing bilaterally. 



Sensation:

 Attends to light touch and noxious stimuli in all extremities



STUDIES REVIEWED

Neurology specific images personally reviewed



CT Cervical Spine wo contrast



Result Date: 2021

1. No acute fracture. Slight anterolisthesis of C2 on C3 with mild widening of t
he C2-C3 disc space may relate to patient positioning, however ligamentous injur
y would be difficult to exclude given patient history of trauma. MRI cervical sp
ine is recommended for further evaluation if clinically indicated. 2. Moderate d
egenerative cervical spondylosis. ATTESTATION STATEMENT: The Staff Radiologist h
as personally reviewed the images and dictated, reviewed, or edited the final re
port. READING SITE: [facility]



CT Head wo contrast



Result Date: 2021

Impression: Patient motion artifact and suboptimal positioning degrades image qu
ality. 1. Mixed attenuation left holohemispheric likely subacute on chronic subd
ural hematoma which measures up to 6 mm in maximal thickness. No significant mas
s effect or discrete midline shift given suboptimal patient positioning. Similar
ventriculomegaly when compared to outside CT. 2. Gray-white loss is identified 
within the right frontotemporal lobe likely representing a subacute or chronic r
ight MCA territory infarct. 3. Right parietal approach ventricular shunt cathete
r is identified with the distal intracranial and terminating near the foramen of
Cook. The partially visualized ventricular shunt catheter tubing and port alonzo
ear intact where visualized. 4. Postprocedural changes of prior aneurysm coiling
are identified possibly involving a basilar tip aneurysm. ATTESTATION STATEMENT:
The staff radiologist has personally reviewed the images and dictated, reviewed,
or edited the final report. READING SITE: [facility]. 



CT Head wo contrast



Result Date: 2021

Impression: 1. Stable mixed attenuation left holohemispheric extra-axial collect
ion likely representing subacute on chronic subdural hematoma measures up to 5 m
m in maximal thickness. No new or enlarging intracranial hemorrhage. No signific
ant mass effect or discrete midline shift. Similar ventriculomegaly. 2. Stable g
ray-white loss involving the right frontotemporal lobes and right insula likely 
representing a subacute right MCA territory infarct. 3. Stable right parietal ap
proach ventricular shunt catheter. 4. Redemonstrated postprocedural changes of p
rior aneurysm coiling are identified possibly involving a basilar tip aneurysm o
r PCOM aneurysm given location. ATTESTATION STATEMENT: The Staff Radiologist has
personally reviewed the images and dictated, reviewed, or edited the final repo
rt.



IR Lumbar puncture w fluoro



Result Date: 2021

Unable to collect spinal fluid at multiple levels. The spinal needle tip was con
firmed within the thecal sac on AP and lateral on multiple levels. ATTESTATION S
TATEMENT: The Staff Physician has personally reviewed the images and dictated, r
eviewed, or edited the final report. READING SITE: [facility]
[place]



Echo Complete with Doppler and Color Flow



Result Date: 2021

  1. Normal left ventricular systolic function, with an estimated ejection fract
ion of 60%. 

  2. Normal right ventricular size and systolic function. 

  3. No significant valvular abnormalities. 

  No previous study available for comparison. 



  Dr Johnny Benavidez

  (Electronically Signed)

  Final Date:    2021 12:38



LABS REVIEWED

Most Recent Result within the last 7 days 

Lab Units 21

0803 21

0627 21

0029 21

0029 21

0640 21

0028 21

2143 21

1226 21

1200 21

0102 

WBC TH/uL  --   --   --  11.36*  --  12.75*  --   --   --    < > 

HEMOGLOBIN g/dL  --  8.0*   < > 6.8*   < > 7.3*  --    < >  --    < > 

HEMATOCRIT %  --  28*   < > 25*   < > 27*  --    < >  --    < > 

PLATELET COUNT Th/uL  --   --   --  202  --  238  --   --   --    < > 

% SEGMENTED NEUTROPHILS %  --   --   --   --   --  87*  --   --   --    < > 

% LYMPHOCYTES %  --   --   --   --   --  3*  --   --   --    < > 

% MONOCYTES %  --   --   --   --   --  9  --   --   --    < > 

% EOSINOPHILS %  --   --   --   --   --  0  --   --   --    < > 

% BASOPHILS %  --   --   --   --   --  0  --   --   --    < > 

SODIUM mEq/L 150*  --    < > 152*  --   --  149*  --   --    < > 

POTASSIUM mEq/L  --   --   --  4.3  --   --  4.6  --   --    < > 

CARBON DIOXIDE mEq/L  --   --   --  28  --   --  28  --   --    < > 

BLOOD UREA NITROGEN mg/dL  --   --   --  46*  --   --  46*  --   --    < > 

GLUCOSE mg/dL  --   --   --  151*  --   --  127*  --   --    < > 

CREATININE mg/dL  --   --   --  1.20  --   --  1.20  --   --    < > 

GFR FEMALE AA mL/min/1.73m*2  --   --   --  53.4*  --   --  53.4*  --   --    < 
> 

GFR FEMALE NON-AA mL/min/1.73m*2  --   --   --  44.0*  --   --  44.0*  --   --  
 < > 

CALCIUM mg/dL  --   --   --  8.0*  --   --  9.6  --   --    < > 

PHOSPHORUS mg/dL  --   --   --   --   --   --  4.1  --   --   --  

ALBUMIN g/dL  --   --   --  3.2*  --   --   --   --   --    < > 

PROTEIN TOTAL SERUM g/dL  --   --   --  5.2*  --   --   --   --   --    < > 

ALKALINE PHOSPHATASE U/L  --   --   --  50  --   --   --   --   --    < > 

ALANINE AMINOTRANSFERASE U/L  --   --   --  114*  --   --   --   --   --    < > 

ASPARTATE AMINOTRANSFERASE U/L  --   --   --  98*  --   --   --   --   --    < >


APTT Sec  --   --   --   --   --   --   --   --  30  --  

INR   --   --   --  1.3*   < >  --   --   --  1.1  --  

 < > = values in this interval not displayed. 



No lab components to display



IMPRESSION

 Encephalopathy; likely secondary to metabolic derangements, UTI, possible al
cohol withdrawal, medication effects, superimposed on baseline cognitive dysfunc
tion.

 Chronic subdural hematoma

 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
balbina s/p  shunt

 Suspected alcohol abuse

 Anemia

 Coagulopathy

 COPD exacerbation

 Acute kidney injury



RECOMMENDATIONS

 Agree with [facility] protocol

 Thiamine 500 mg IV 3 times daily.  Will defer to staff for future dosing

 Daily multivitamin

 Labs: RPR, thiamine, B12/folate, TSH, vitamin D

 Seizure precautions throughout hospitalization

 Neuro checks per unit routine

 Supportive care and correction of underlying metabolic derangements

 We will defer to ID regarding antibiotic therapy

 Discussed with Dr. Man who will follow with additional recommendations



GIA Olivo  2021 11:43 AM Contact through [employer] with any qu
estions or concerns 



PPE Statement: GIA Olivo used Yellow precautions (Level 1 mask wo
rn over level 3 mask, eye protection, and gloves).



Principal Problem:

  Encephalopathy

Active Problems:

  UTI (urinary tract infection)

  SDH (subdural hematoma) ([facility])

  History of cerebral aneurysm

  S/P  shunt

  COPD (chronic obstructive pulmonary disease) (HCC)

  Bronchitis

  Elevated INR

  GREGORY (acute kidney injury) (HCC)

  Hypernatremia

  Other specified anemias

  Elevated troponin

  Acute pain due to trauma

  Coagulopathy (HCC)

  Acute encephalopathy

  Chronic respiratory failure with hypoxia (HCC)

  COPD with acute exacerbation (HCC)

  Leukocytosis

  Anemia

  Transaminitis

  Hx of hepatitis C







Electronically signed by Rosamaria Man MD at 2021  5:33 PM CST

* Nickolas Montana MD - 2021 11:33 AM CST



Associated Order(s): IP CONSULT TO INFECTIOUS DISEASE



Formatting of this note is different from the original.



[facility] 



INFECTIOUS DISEASE CONSULTATION 



NAME: Catrachito Angel

MRN: [id number]

AGE: 71 y.o.               : 1950

ADMISSION DATE: 2021

PRIMARY CARE PROVIDER: Henrik Allison MD

ATTENDING PHYSICIAN: Francisco Herrmann MD



REASON FOR CONSULT: 

Antibiotic management for suspected meningitis



HISTORY OF PRESENT ILLNESS: 

Patient is 71 years old male with past medical history significant for COPD, chr
onic hypoxemic respiratory failure, history of cerebral aneurysm status post coi
ling complicated with posthemorrhagic hydrocephalus requiring right occipital 
shunt

Patient was admitted on  with altered mental status.  Patient was found by 
neighbor unconscious in the home.  Patient was brought initially to an outside h
ospital where CT head showed acute on chronic left subdural hematoma measuring 3
mm.  Patient was then transferred to [facility] for neurosurgical
evaluation



Neurosurgery did not recommend any acute neurosurgical intervention.  They think
that this is a small subdural hematoma and is unlikely the cause for acute alte
red mental status



Multiple attempts to get lumbar puncture for CSF analysis by interventional radi
ology yesterday, however all attempts failed



Patient was started empirically on  with IV acyclovir, IV ampicillin, IV ce
fepime, IV vancomycin to cover for bacterial/viral meningitis/encephalitis



WBC 12.7, patient is afebrile



Patient seen in the room, patient is altered.  Patient seems agitated and uncoop
erative



It seems that his mental status improved slightly since antibiotics started yest
mayda



CURRENT MEDICATIONS: 



Current Facility-Administered Medications 

Medication Dose Route Frequency Provider Last Rate Last Admin 

 acyclovir (ZOVIRAX) 640 mg in dextrose (D5W) 5 % 100 mL IVPB  10 mg/kg (Idea
l) Intravenous Q12H MARQUISE Friedman, PharmD   Stopped at 21 1053 

 alteplase (CATHFLO ACTIVASE) injection 1 mg  1 mg Intra-Catheter PRN CHANO Grimm     

 ampicillin (OMNIPEN) 2 g in sodium chloride ADDV (NS) 100 mL IVPB  2 g Intra
venous Q6H JESSICA Manning 150 mL/hr at 21 0532 2 g at 
1 0532 

 cefepime (MAXIPIME) 2 g in sodium chloride ADDV (NS) 50 mL IVPB  2 g Intrave
nous Q12H [facility] Reuben Friedman PharmD   Stopped at 21 0909 

 dexmedeTOMIDINE (PRECEDEX) 4 mcg/mL infusion 100 mL (premix)  0.2-1.4 mcg/kg
/hr Intravenous Continuous CHANO Grimm 14.62 mL/hr at 21 1058 0
.8 mcg/kg/hr at 21 1058 

 dextrose 50% (D50W) syringe 25-50 mL  25-50 mL Intravenous PRN JESSICA Correa     

 famotidine (PEPCID) tablet 20 mg  20 mg Oral Q12H Jefferson Marco, DO     

 Or 

 famotidine (PEPCID) injection 20 mg  20 mg Intravenous Q12H Jefferson Marco, DO 
 20 mg at 21 2332 

 glucagon (GLUCAGEN) injection 1 mg  1 mg Intramuscular PRN JESSICA Mejía     

 Or 

 glucagon (GLUCAGEN) injection 1 mg  1 mg Subcutaneous PRN JESSICA Mejía     

 glucose chewable tablet 16-32 g  16-32 g Oral PRN JESSICA Mejía     

 hydrALAZINE (APRESOLINE) injection 10-20 mg  10-20 mg Intravenous Q2H PRN JESSICA Brown     

 ipratropium-albuteroL (DUO-NEB) 0.5-3 mg/3 mL nebulizer solution 3 mL  3 mL 
Inhalation 4x daily JESSICA Mejía   3 mL at 21 0830 

 lactated ringers infusion  100 mL/hr Intravenous Continuous CHANO Garay 100 mL/hr at 21 1005 100 mL/hr at 21 1005 

 lidocaine (XYLOCAINE) 10 mg/mL (1 %) injection 1-10 mL  1-10 mL Intradermal 
Once PRN CHANO Grimm     

 lipase-protease-amylase (VIOKACE) 10,440-39,150- 39,150 unit tablet 10,440 u
nits of lipase  10,440 units of lipase Per NG tube PRN Hang Esqueda RD     

 And 

 sodium bicarbonate tablet 325 mg  325 mg Per NG tube PRN Hang Esqueda RD
    

 methylPREDNISolone sod suc(PF) (SOLU-Medrol) injection 40 mg  40 mg Intraven
ous Q8H MARQUISE JESSICA Mejía   40 mg at 21 0847 

 ondansetron (ZOFRAN) injection 4 mg  4 mg Intravenous Q6H PRN CIRO Nguyễn
O     

 prochlorperazine (COMPAZINE) injection 2.5-5 mg  2.5-5 mg Intravenous Q4H ME
N Jefferson Lara DO     

 Or 

 prochlorperazine (COMPAZINE) injection 2.5-5 mg  2.5-5 mg Intramuscular Q4H 
PRN Jefferson Lara DO     

 Or 

 prochlorperazine (COMPAZINE) suppository 25 mg  25 mg Rectal Q12H PRN Jefferson Lara DO     

 senna-docusate (PERICOLACE) 8.6-50 mg 1 tablet  1 tablet Oral BID JESSICA Mejía   1 tablet at 21 0840 

 vancomycin (VANCOCIN) 1,000 mg in sodium chloride 0.9 % (NS) 250 mL IVPB  1,
000 mg Intravenous Q12H Reuben Friedman PharmD 250 mL/hr at 21 0952 1,000 m
g at 21 0952 



ALLERGIES: 

Patient has no known allergies.



PAST MEDICAL HISTORY: 



Past Medical History: 

Diagnosis Date 

 Cerebral aneurysm  

 COPD (chronic obstructive pulmonary disease) (HCC)  



PAST SURGICAL HISTORY: 



Past Surgical History: 

Procedure Laterality Date 

 VENTRICULOPERITONEAL SHUNT   



PROBLEM LIST: 



Active Hospital Problems 

 Diagnosis SNOMED CT(R) Date Noted 

 Encephalopathy DISORDER OF BRAIN 2021 

 UTI (urinary tract infection) URINARY TRACT INFECTIOUS DISEASE 2021 

 SDH (subdural hematoma) (HCC) HEMATOMA OF SUBDURAL SPACE OF NEURAXIS 
021 

 History of cerebral aneurysm HISTORY OF CEREBRAL ANEURYSM 2021 

 S/P  shunt H/O: SURGERY 2021 

 COPD (chronic obstructive pulmonary disease) (HCC) CHRONIC OBSTRUCTIVE LUNG 
DISEASE 2021 

 Bronchitis BRONCHITIS 2021 

 Elevated INR INR RAISED 2021 

 GREGORY (acute kidney injury) (HCC) ACUTE INJURY OF KIDNEY 2021 

 Hypernatremia HYPERNATREMIA 2021 

 Other specified anemias ANEMIA 2021 

  Class: Acute 

 Elevated troponin PROTEIN LEVEL - FINDING 2021 

 Acute pain due to trauma ACUTE PAIN DUE TO INJURY 2021 

 Coagulopathy (HCC) BLOOD COAGULATION DISORDER 2021 

 Acute encephalopathy DISORDER OF BRAIN 2021 

 Chronic respiratory failure with hypoxia (HCC) CHRONIC HYPOXEMIC RESPIRATORY
FAILURE 2021 

  Chronic 

 COPD with acute exacerbation (HCC) ACUTE EXACERBATION OF CHRONIC OBSTRUCTIVE
AIRWAYS DISEASE 2021 

 Leukocytosis LEUKOCYTOSIS 2021 

 Anemia ANEMIA 2021 

 Transaminitis ENZYME LEVEL - FINDING 2021 

 Hx of hepatitis C HISTORY OF HEPATITIS C 2021 

  Chronic 

 

Resolved Hospital Problems 

No resolved problems to display. 



FAMILY HISTORY: 

History reviewed. No pertinent family history.



SOCIAL HISTORY: 



Social History 



Socioeconomic History 

 Marital status: Single 

  Spouse name: Not on file 

 Number of children: Not on file 

 Years of education: Not on file 

 Highest education level: Not on file 

Occupational History 

 Not on file 

Tobacco Use 

 Smoking status: Not on file 

 Smokeless tobacco: Not on file 

Substance and Sexual Activity 

 Alcohol use: Not on file 

 Drug use: Not on file 

 Sexual activity: Not on file 

Other Topics Concern 

 Not on file 

Social History Narrative 

 Not on file 



Social Determinants of Health 



Financial Resource Strain:  

 Difficulty of Paying Living Expenses: Not on file 

Stress:  

 Feeling of Stress : Not on file 

Intimate Partner Violence:  

 Fear of Current or Ex-Partner: Not on file 

 Emotionally Abused: Not on file 

 Physically Abused: Not on file 

 Sexually Abused: Not on file 



REVIEW OF SYSTEMS: 

ROS unobtainable because Altered mental status



PHYSICAL EXAM: 

Vitals:

Vitals: 

 21 1100 

BP: 133/62 

Pulse: 85 

Resp: 26 

Temp: 36.1 C (97 F) 

TempSrc:  

SpO2: 96% 

Weight:  

Height:  



Temp (24hrs), Av.5 C (97.7 F), Min:35 C (95 F), Max:37.5 C (99.5 
F)



General: Altered, uncooperative, agitated

Head: Normocephalic, atraumatic 

Nose and throat: unremarkable

Neck: no adenopathy

Chest: clear to auscultation bilaterally 

CV: regular rhythm without murmur, gallop or rub

Abdomen: normal bowel sounds, soft, nontender

Extremities: normal, no edema

Skin: color normal, no rash

Neurological: Altered, uncooperative, agitated



LABORATORY RESULTS: 



Most Recent Result within the last 7 days 

Lab Units 21

0028 21

1226 21

0102 21

2249 21

2249 

WBC TH/uL 12.75*  --  12.78*  --  13.22* 

HEMOGLOBIN g/dL 7.3* 7.8* 6.0*   < > 6.1* 

HEMATOCRIT % 27* 28* 23*   < > 23* 

PLATELET COUNT Th/uL 238  --  322  --  330 

% SEGMENTED NEUTROPHILS % 87*  --   --   --  84* 

% MONOCYTES % 9  --   --   --  11 

 < > = values in this interval not displayed. 



Most Recent Result within the last 7 days 

Lab Units 21

2143 21

2325 21

2249 

SODIUM mEq/L 149* 147* 147* 

POTASSIUM mEq/L 4.6 4.6 5.0 

CARBON DIOXIDE mEq/L 28 29 28 

BLOOD UREA NITROGEN mg/dL 46* 44* 43* 

CREATININE mg/dL 1.20 1.40* 1.50* 

GLUCOSE mg/dL 127* 83 83 

CALCIUM mg/dL 9.6 8.8 8.9 



Lab Results 

Component Value Date 

 PROCALCIT 0.09 (H) 2021 



CULTURES: 

No results found for this visit on 21.



CULTURE SUMMARY: 

No cultures



SEROLOGIES: 

Procalcitonin  was 0.09

SARS COVID-19 PCR  -



IMAGING RESULTS: 



Patient:   CATRACHITO ANGEL   



Sex#: M                             

#: [id number]             [place]#: [id number]

Location: [place] [address]   Accession#: [id number]

 

Ordering Provider:   GRISELDA BLEDSOE

Procedure Requested:  [id number] CT CHEST WO CONTRAST

Reason for Exam:  pneumothorax

Exam Ordered:        2021  1016

Begin exam date/time:  2021  1028

Exam Date/Time:      2021  1035

 

 

CT CHEST WO CONTRAST 



INDICATION: pneumothorax.



COMPARISON STUDY: Portable chest dated 2021.



TECHNIQUE:  Unenhanced axial images were obtained through the lungs and

upper abdomen. Coronal MIP images and coronal and sagittal multiplanar

reconstructions were also obtained.



FINDINGS: Image quality degraded by motion.



Life Support Devices: Gastric tube courses into the stomach. Partially

imaged shunt catheter courses along the anterior chest wall and into the

abdomen.



Lungs and Airways: Apical paraseptal emphysema and bullous disease. Mild

to moderate upper lobe predominant centrilobular emphysema. Diffuse

right greater than left interlobular septal thickening. Right greater

than left posterior relaxation atelectasis. Normal central airways.

Diffuse bronchial wall thickening. Peripheral endoluminal plugs.



Pleura: Small right greater than left bilateral pleural effusions with

features of partial loculation on the right. No pneumothorax.



Heart and Mediastinum: Atrophic thyroid. No axillary or supraclavicular

lymphadenopathy. No mediastinal, hilar or retrocrural lymphadenopathy.

Cardiomegaly. No pericardial effusion. Heavy coronary artery

calcifications. Aortic valve leaflet calcifications. Atherosclerosis of

the thoracic aorta and branch vessels. Gas in the pulmonary trunk is

likely iatrogenic. Small hiatus hernia. Patulous esophagus with mural

thickening.



Abdomen: Small upper abdominal ascites. Atherosclerosis of the abdominal

aorta and branch vessels.



Bones and Soft Tissues: Thoracic spondylosis. Diffuse body wall edema.



IMPRESSION

1.  Diffuse interstitial edema.

2.  Posterior relaxation and scattered subsegmental atelectasis. No

pulmonary mass or confluent consolidation.

3.  Cardiomegaly. Heavy coronary artery calcifications. Aortic valve

leaflet calcifications could represent calcific aortic stenosis.

4.  Small bilateral pleural effusions with features of partial

loculation on the right. No pneumothorax.

5.  Small upper abdominal ascites.



READING SITE: [facility]

ASSESSMENT: 

1. Altered mental status: Possible meningitis/encephalitis: CT head shows chroni
c left-sided subdural hematoma.  Neurosurgery thinks that his altered mental sta
tus is unlikely secondary to subdural hematoma.  Concern for CNS infection.  Pat
ient started empirically on ampicillin, cefepime, vancomycin and acyclovir on .  LP failed.  Patient seems to improve slightly after antibiotics started on


2. Chronic hypoxemic respiratory failure

3. COPD

4. History of cerebral aneurysm with coiling s/p left occipital  shunt

5. Urinary tract infection

 

PLAN: 

 Continue empiric coverage for possible CNS infection with IV ampicillin, IV c
efepime, IV vancomycin and IV acyclovir

 Agree with MRI brain ordered by primary team



--------------------------------------------------------------------------------
-------------------

Thank you for including infectious disease team in the care of your patient. Ple
ase reach out with any further questions. Will continue to follow along with you
.

All findings discussed with attending physician,  with further recommen
dations to follow. 

--------------------------------------------------------------------------------
--------------------

Shola Hubbard MD

Internal Medicine - PGY II

[phone]



Electronically signed by Shola Hubbard MD 2021 11:33 AM

--------------



I saw and examined the patient with Dr Shola Hubbard. I have reviewed and agree 
with the history, exam, assessment and plan as documented in the resident's note
.  I have edited as appropriate to reflect my findings.



Attending Addendum:

As above

Possible meningitis/ shunt infection

On broad-spectrum antibiotics

Unfortunately CSF could not be obtained multiple tries

To get MRI today



Nickolas Montana MD 2021 12:00 PM







Electronically signed by Nickolas Montana MD at 2021 12:01 PM CST

* Garo Gooden RN - 2021  9:36 AM CST



Associated Order(s): CONSULT - VASCULAR ACCESS TEAM



Formatting of this note might be different from the original.

PICC placed. Patient was inadvertently stuck with the lidocaine needle X3 after 
becoming extremely combative with first stick. Once lidocaine was administered p
t settled down and allowed for PICC to be placed. 



Electronically signed by Garo Gooden RN at 2021  9:39 AM CST

* Carley GayD - 2021  9:08 PM CST



Associated Order(s): IP CONSULT TO PHARMACY



Formatting of this note is different from the original.

Initial Pharmacokinetic Consult: Anti-Infective Dosing



Pharmacy has been consulted on Catrachito Angel, a 71 y.o. male, to dose acycl
ovir, cefepime, and vancomycin for meningitis.



Relevant clinical data and objective history reviewed:

Creatinine 

Date Value Ref Range Status 

2021 1.40 (H) 0.70 - 1.30 mg/dL Final 

2021 1.50 (H) 0.70 - 1.30 mg/dL Final 



Dialysis Modality Requirements: None; Estimated Creatinine Clearance: 42.4 mL/mi
n (A) (based on SCr of 1.4 mg/dL (H)).



I/O last 3 completed shifts:

In: 1125 [I.V.:1078.3; IV Piggyback:46.8]

Out: 1540 [Urine:1540]



Intake/Output Summary (Last 24 hours) at 2021 2105

Last data filed at 2021 2000

Gross per 24 hour 

Intake 1239.55 ml 

Output 1570 ml 

Net -330.45 ml 



Lab Results 

Component Value Date/Time 

 WBC 12.78 (H) 2021 01:02 AM 



Lab Results 

Component Value Date/Time 

 PROCALCIT 0.09 (H) 2021 01:02 AM 



Temp Readings from Last 3 Encounters: 

21 (!) 35.5 C (95.9 F) (Rectal) 



Culture: No results found for this visit on 21.

Current weight is 73.1 kg (161 lb 2.5 oz) 

IBW(kg) (Calculated) : 63.8

Adjusted Body Weight (kg): 67.52



Assessment/Plan

Based on current pharmacokinetic parameters the patient will be started on 

1- acyclovir  640 mg IV (10 mg/kg IBW) every 12 hours.

2- Cefepime 2 g IV every 12 hours

3- Will initiate a vancomycin loading dose of 1500 mg IV followed by a vancomyci
n maintenance dose of 1000 mg every 12 hours.  Vancomycin level monitoring will 
be considered once steady state levels are achieved.  Due to infection severity,
 the target goal will be a vancomycin trough of 15-20 mcg/mL.



Baseline risks associated with therapy include: pre-existing renal impairment, c
oncomitant nephrotoxic medications, and advanced age.  

Pharmacy will continue to follow the patients culture results and clinical pr
ogress daily. 



Reuben Friedman PharmD



Electronically signed by Reuben Friedman PharmD at 2021  9:08 PM CST

documented in this encounter



ED Notes

* Sarah Lugo MD - 2021 10:21 PM CST



Associated Order(s): Critical Care



Formatting of this note is different from the original.

2021

[facility]



History 



Chief Complaint 

Patient presents with 

 Altered Mental Status 

  pt transfer from [facility]. Found on floor today  by neighbor. Dx with SDH a
nd subacute stroke. pt is A&Ox1 



Patient is a 71-year-old male transferred to the [place] ED from an outside facili
ty with a subdural.  Patient not currently verbalizing and all information obtai
jose e from patient's medical record.  Has a history of oxygen dependent COPD as we
ll as a prior aneurysm coiling and  shunt placement.  Fatigued with a decrease
 in function over the last month.  Family had expressed concern he may have had 
a stroke last week but he refused EMS transport at that time.  Paramedics were c
alled by patient's neighbor today when he was found unresponsive with his oxygen
 off.  Imaging at the outside facility revealed a subacute stroke and left cereb
ellar subdural hemorrhage consistent with family's recent concerns about patient
 so he was sent here for trauma and neurosurgery assessment.



The history is provided by the EMS personnel and medical records. 

Altered Mental Status

Presenting symptoms: lethargy and partial responsiveness  

Severity:  Unable to specify

Episode history:  Unable to specify

Timing:  Constant

Progression:  Unable to specify

Chronicity:  New

Associated symptoms: no fever and no vomiting  



Pertinent Past Medical, Psychiatric, and Social History Reviewed



Past Medical History: 

Diagnosis Date 

 Cerebral aneurysm  

 COPD (chronic obstructive pulmonary disease) (HCC)  



Past Surgical History: 

Procedure Laterality Date 

 VENTRICULOPERITONEAL SHUNT   



History reviewed. No pertinent family history.



Social History 



Tobacco Use 

 Smoking status: Not on file 

 Smokeless tobacco: Not on file 

Substance Use Topics 

 Alcohol use: Not on file 

 Drug use: Not on file 



Review of Systems 

Unable to perform ROS: Mental status change 

Constitutional: Negative for fever. 

Gastrointestinal: Negative for vomiting. 



Physical Exam 

BP (!) 150/135  | Pulse (!) 105  | Temp 98.1 F (36.7 C) (Axillary)  | Resp 2
4  | SpO2 98% 

 

O2 Device: Nasal cannula



Physical Exam

Vitals and nursing note reviewed. 

Constitutional:  

   General: He is not in acute distress.

   Appearance: He is well-developed. 

   Comments: Sleepy but in no distress. 

HENT: 

   Head: Normocephalic and atraumatic. 

Eyes: 

   Conjunctiva/sclera: Conjunctivae normal. 

   Pupils: Pupils are equal, round, and reactive to light. 

Cardiovascular: 

   Rate and Rhythm: Regular rhythm. Tachycardia present. 

Pulmonary: 

   Effort: Pulmonary effort is normal. No respiratory distress. 

   Breath sounds: Normal breath sounds. 

Abdominal: 

   General: There is no distension. 

   Palpations: Abdomen is soft. 

   Tenderness: There is no abdominal tenderness. 

Musculoskeletal:    

   General: Normal range of motion. 

   Cervical back: Normal range of motion. 

Skin:

   General: Skin is warm and dry. 

Neurological: 

   GCS: GCS eye subscore is 3. GCS verbal subscore is 2. GCS motor subscore is 5
. 

   Comments: Sleepy.  Will open eyes to loud voice.  Mumbling but not conversing
 with me.  Does localize pain.  Does look to be moving all extremities. 

Psychiatric:    

   Speech: He is noncommunicative. 



ED Course 



Critical Care

Performed by: aSrah Lguo MD

Authorized by: Sarah Lugo MD 



Critical care provider statement: 

  Critical care time (minutes):  32

  Critical care time was exclusive of:  Separately billable procedures and treat
ing other patients

  Critical care was necessary to treat or prevent imminent or life-threatening d
eterioration of the following conditions:  CNS failure or compromise, dehydratio
n, circulatory failure, cardiac failure and respiratory failure

  Critical care was time spent personally by me on the following activities:  De
velopment of treatment plan with patient or surrogate, discussions with consulta
nts, evaluation of patient's response to treatment, examination of patient, obta
ining history from patient or surrogate, review of old charts, re-evaluation of 
patient's condition, pulse oximetry, ordering and review of radiographic studies
, ordering and review of laboratory studies and ordering and performing treatmen
ts and interventions



 

CT Head wo contrast 

Preliminary Result 

1. Small volume left cerebral hemisphere subdural fluid collection. Favor  

chronic finding of uncertain age with no available comparison imaging.  

2. No acute brain parenchymal abnormality identified.  

 

READING SITE: [facility] 

 

THIS DOCUMENT HAS BEEN ELECTRONICALLY SIGNED BY ALEX GONCALVES MD 

 

XR Pelvis one or two views 

Final Result 

Negative for acute pelvic fracture.  

 

READING SITE: [facility] 

 

THIS DOCUMENT HAS BEEN ELECTRONICALLY SIGNED BY ALEX GONCALVES MD 

 

XR Chest single view frontal 

Final Result 

No acute chest abnormality.  

 

READING SITE: [facility] 

 

THIS DOCUMENT HAS BEEN ELECTRONICALLY SIGNED BY ALEX GONCALVES MD 

 

CT Head wo contrast    (Results Pending) 



EKG (interpreted by me): Sinus tach @ 114 bpm. Extensive baseline artifact great
ly limits interpretation.  No obvious STEMI.  No priors for comparison.



MDM



2240-Patient arrives somnolent but with stable vital signs.  Initial blood press
ure quite high but on recheck improved spontaneously at 119/82.  Maintaining his
 airway.  Repeat labs pending.  Awaiting trauma assessment.  Repeat labs ordered
.



2305-Spoke with patient's DPOA over the phone (Steven Varinder Rogers, [phone]).  
Confirms patient would be a full code.  They also confirm that has been doing ve
ry poorly in recent weeks, particularly over the last few days.  Only medication
s they are aware he takes are "pain and sleeping pills." Patient stable. Trauma 
team currently at bedside.  Blood pressure fluctuating.  Will order a Cardene dr
ip if needed.



4579-Labs as below. Troponin elevated. EKG difficult to interpret. Will trend. A
spirin held due to patient's intracranial hemorrhage. On reassessment, patient m
ore awake than earlier. CGS 12 (E-4, V-3, M-5). Occasionally conversing but stil
l confused and not following commands. Moving all 4 extremities spontaneously. B
lood pressure continues to fluctuate as patient is fighting the blood pressure c
uff. Spoke with Dr. Sadler from the neuro ICU. They will place an arterial line 
once patient upstairs for more accurate assessment and determine need for Shreyas
e at that time.



 



 

The patient's vitals signs are

BP (!) 150/135  | Pulse (!) 105  | Temp 98.1 F (36.7 C) (Axillary)  | Resp 2
4  | SpO2 98% 

The labs from this visit are

Results for orders placed or performed during the hospital encounter of 21
 (from the past 24 hour(s)) 

Comprehensive Metabolic Panel 

Result Value Ref Range 

 Sodium 147 (H) 136 - 145 mEq/L 

 Potassium 5.0 3.4 - 5.1 mEq/L 

 Chloride 112 (H) 98 - 107 mEq/L 

 Carbon Dioxide 28 20 - 31 mEq/L 

 Anion Gap 7 5 - 17 mmol/L 

 Anion Gap 7 mmol/L 

 Calcium 8.9 8.3 - 10.6 mg/dL 

 Glucose 83 70 - 100 mg/dL 

 Protein Total Serum 5.8 5.7 - 8.2 g/dL 

 Albumin 3.8 3.5 - 5.0 g/dL 

 Alkaline Phosphatase 62 46 - 116 U/L 

 Alanine Aminotransferase 123 (H) 0 - 49 U/L 

 Aspartate Aminotransferase 217 (H) 0 - 34 U/L 

 Bilirubin Total 0.50 0.20 - 1.10 mg/dL 

 Blood Urea Nitrogen 43 (H) 9 - 23 mg/dL 

 Creatinine 1.50 (H) 0.70 - 1.30 mg/dL 

 eGFR Female AA 41.3 (L) 60.0 - 200.0 mL/min/1.73m*2 

 eGFR Female Non-AA 34.0 (L) 60.0 - 200.0 mL/min/1.73m*2 

 eGFR Male AA 55.6 (L) 60.0 - 200.0 mL/min/1.73m*2 

 eGFR Male Non-AA 45.9 (L) 60.0 - 200.0 mL/min/1.73m*2 

Troponin-I HS 0HR w/ Reflex 

Result Value Ref Range 

 Troponin I, 0HR  (HH) <3-53 pg/mL pg/mL 

Clotting Screen 

Result Value Ref Range 

 Protime 27.9 (H) 11.4 - 15.0 Sec 

 INR 2.7 (H) 0.8 - 1.2 

 APTT 43 (H) 22 - 34 Sec 

Urinalysis (includes microscopic review, if indicated) 

Result Value Ref Range 

 Appearance, Urine Yellow Colorless, Yellow, Dark Yellow 

 Glucose Urine Negative Negative mg/dL 

 Bilirubin Urine Negative Negative 

 Ketones Urine Small (A) Negative 

 Specific Gravity Urine 1.019 >1.005-<1.030 

 Hemoglobin Urine Moderate (A) Negative 

 PH Urine 5.5 5.0 - 8.0 

 Protein Urine Qual 100   (A) Negative, Trace mg/dL 

 Urobilinogen Urine Normal Normal, Negative, 1.0 EU/dL 

 Nitrite Urine Negative Negative 

 Leukocyte Esterase Positive (A) Negative 

Urinalysis Microscopic Only 

Result Value Ref Range 

 Microscopic RBC Urine 6-10 (A) 0 - 5 /hpf 

 Microscopic WBC Urine >40 (A) 0 - 5 /hpf 

 Epithelial Cells Absent Absent 

 Hyaline Cast Small (A) Absent 

 Bacteria Absent Absent 

Electrocardiogram (ECG) 

Result Value Ref Range 

 QRSd 82  

   

   

 ECGHR 114  

 ECGPR 176  

Arterial Blood Gas 

Result Value Ref Range 

 PH Arterial 7.31 (L) 7.35 - 7.45 units 

 PCO2 Arterial 60 (H) 35 - 45 mmHg 

 PO2 Arterial 90 80 - 100 mmHg 

 Bicarbonate 30.2 (H) 19.0 - 29.0 mEq/L 

 Base Excess 2.4 -3.0 - 3.0 mEq/L 

 Sample site Left Radial  

 Collateral Circulation Yes  

 Mode Oxygen  

 Device NC  

 LPM 3 L/min 



The final diagnosis is 

  SNOMED CT(R) 

1. Altered mental status, unspecified altered mental status type  ALTERED MENTAL
 STATUS 

2. Subdural hemorrhage (HCC)  HEMORRHAGE INTO SUBDURAL SPACE OF NEURAXIS 

3. Dehydration  DEHYDRATION 



No follow-up provider specified.



ED Clinical Impression 



1. Altered mental status, unspecified altered mental status type  

2. Subdural hemorrhage (HCC)  

3. Dehydration  

 



Patient ED Dispo  

 ED Disposition 

 Admit 

 

 



 

Sarah Lugo MD

21 0045





Electronically signed by Sarah Lugo MD at 2021 12:45 AM CST

documented in this encounter



Miscellaneous Notes

* Nutrition Note - Hang Esqueda RD - 2021  8:18 AM CST



Formatting of this note is different from the original.

Nutrition Brief Note

[facility]



DIAGNOSIS & INTERVENTION:

DIAGNOSIS 1

Nutrition Diagnosis 1: NI 2.1  Inadequate oral intake

Related To: AMS

As Evidenced By: EMR review, need for EN to meet nutritional needs

Goal: Maintain present weight +/- 5%,TF to provide % of estimated needs un
til pt able to take in >50% of meals consumed

Time Frame: Throughout stay

Goal Status: Ongoing

Nutrition Diagnosis Comment: Hypernatremia, Na+ at 154; FWF increased per primar
y to 150 ml q 2 hrs today from q 4 hrs.  Tolerating TF well, LBM .  Pt with
 b/l Feet and hand edema. doesn't follow commands

Intervention/Plan 1a: Continue with Osmolite 1.5 @  goal of 50 ml/hr + 1 prosour
ce pkt per day to provide: 1860 kcal/d, 90 gm pro/day.  FWF per Primary.  Once N
a+ WNL change FWF to 145 ml q 4 hrs  1784 ml Free H2O.

Intervention/Plan 1b: RD will continue to monitor for TF tolerance, weight, labs
 and POC.

 

 



Malnutrition criteria: 

Malnutrition Recommendation - Physician Alert

Malnutrition Rec to Provider: No Recommendation

 

 

 

Labs: Results for CATRACHITO ANGEL (MRN [id number]) as of 2021 08:10

 Ref. Range 2021 00:18 2021 00:22 2021 05:30 

SODIUM Latest Ref Range: 136 - 145 mEq/L 154 (H)   

POTASSIUM Latest Ref Range: 3.4 - 5.1 mEq/L 4.4   

CHLORIDE Latest Ref Range: 98 - 107 mEq/L 120 (H)   

CARBON DIOXIDE Latest Ref Range: 20 - 31 mEq/L 32 (H)   

Anion Gap Latest Ref Range: 5 - 17 mmol/L 2 (L)   

Glucose Latest Ref Range: 70 - 100 mg/dL 123 (H)   

Blood Urea Nitrogen Latest Ref Range: 9 - 23 mg/dL 36 (H)   

Creatinine Latest Ref Range: 0.70 - 1.30 mg/dL 1.00   

eGFR Female AA Latest Ref Range: 60.0 - 200.0 mL/min/1.73m*2 65.9   

eGFR Female Non-AA Latest Ref Range: 60.0 - 200.0 mL/min/1.73m*2 54.3 (L)   

eGFR Male AA Latest Ref Range: 60.0 - 200.0 mL/min/1.73m*2 88.8   

eGFR Male Non-AA Latest Ref Range: 60.0 - 200.0 mL/min/1.73m*2 73.2   

CALCIUM Latest Ref Range: 8.3 - 10.6 mg/dL 8.2 (L)   

Glucose POC Latest Ref Range: 70 - 100 mg/dL  113 (H) 133 (H) 



Scheduled Meds:

 acyclovir  10 mg/kg (Ideal) Intravenous Q12H MARQUISE 

 ampicillin  2 g Intravenous Q6H MARQUISE 

 bisacodyL  10 mg Rectal Daily 

 cefepime  2 g Intravenous Q12H MARQUISE 

 famotidine  20 mg Oral Q12H 

 Or 

 famotidine  20 mg Intravenous Q12H 

 guanFACINE  2 mg Per NG tube BID 

 haloperidol lactate  5 mg Intravenous Once 

 insulin lispro  2-7 Units Subcutaneous Q6H MARQUISE 

 ipratropium-albuteroL  3 mL Inhalation 4x daily 

 therapeutic multivitamin  1 tablet Oral Daily 

 polyethylene glycol  17 g Oral Daily 

 senna-docusate  2 tablet Oral BID 

 thiamine  250 mg Intravenous TID 

 vancomycin  750 mg Intravenous Q12H 



Continuous Infusions:

PRN Meds:alteplase, dextrose 50%, flumazeniL, glucagon **OR** glucagon, glucose,
 hydrALAZINE, labetaloL, lipase-protease-amylase **AND** sodium bicarbonate, ELIA
azepam **OR** LORazepam, magnesium hydroxide, ondansetron, prochlorperazine **OR
** prochlorperazine **OR** prochlorperazine



RD will continue to follow



Electronically signed by Hang Esqueda 2021 8:18 AM





Electronically signed by Hang Esqueda RD at 2021  8:19 AM CST

* End of Shift Note - Kavita Esqueda RN - 2021  6:23 AM CST



Formatting of this note is different from the original.

End of Shift Summary and Plan of Care



Increased restlessness and agitation. Received total of 4mg of Ativan per CIWA p
rotocol. Tachycardia 130s and BP >160 corrected with PRN hydralazine and 
labetalol. 



Goals per Patient Condition

Restraints in Use - Patient will remain free from injury related to restraints a
nd will be evaluated for need. See restraint flowsheets for intervention documen
tation.

Fall Prevention Plan  Patient will remain free from injury related to falls. 
See the Daily cares/safety flowsheet for intervention documentation.

Skin Integrity Plan  Patient skin integrity maintained. See integumentary tamela
wsheet for intervention documentation.

High Risk Seizure  Patient will remain free of injury related to seizures. Se
e seizure precaution documentation for interventions.



Goals/Plan for Shift

Patient/Family stated goal for shift: karthik

Nursing goal for shift: maintain skin integrity

Plan: q2 repositioning, elevate extremities, protect bony prominences



Goals/Plan for Hospital Stay



Nursing goal for hospital stay:  

Plan:  







Electronically signed by Kavita Esqueda RN at 2021  6:25 AM CST

* End of Shift Note - Dequan Amaya RN - 2021  6:27 PM CST



Formatting of this note is different from the original.

End of Shift Summary and Plan of Care



Precedex turned off in AM. Restless throughout the day. Ativan given x1 based on
 CIWA score. VSS. NGUYEN spontaneously and to painful stimuli. 



Goals per Patient Condition

Restraints in Use - Patient will remain free from injury related to restraints a
nd will be evaluated for need. See restraint flowsheets for intervention documen
tation.

Fall Prevention Plan  Patient will remain free from injury related to falls. 
See the Daily cares/safety flowsheet for intervention documentation.

Skin Integrity Plan  Patient skin integrity maintained. See integumentary tamela
wsheet for intervention documentation.

High Risk Seizure  Patient will remain free of injury related to seizures. Se
e seizure precaution documentation for interventions.



Goals/Plan for Shift

Patient/Family stated goal for shift: KARTHIK

Nursing goal for shift: Stable neuro exam

Plan: Neuro checks per order, ativan as needed based on CIWA



Goals/Plan for Hospital Stay



Nursing goal for hospital stay:  

Plan:  







Electronically signed by Dequan Amaya RN at 2021  6:28 PM CST

* End of Shift Note - Nicole Lynch RN - 2021  6:13 AM CST



Formatting of this note is different from the original.

End of Shift Summary and Plan of Care



Neuro status remains unchanged throughout shift.

Ativan x2 given for CIWA, see MAR.

No other acute events occurred.



Goals per Patient Condition

Restraints in Use - Patient will remain free from injury related to restraints a
nd will be evaluated for need. See restraint flowsheets for intervention documen
tation.

Fall Prevention Plan  Patient will remain free from injury related to falls. 
See the Daily cares/safety flowsheet for intervention documentation.

Skin Integrity Plan  Patient skin integrity maintained. See integumentary tamela
wsheet for intervention documentation.

High Risk Seizure  Patient will remain free of injury related to seizures. Se
e seizure precaution documentation for interventions.



Goals/Plan for Shift

Patient/Family stated goal for shift: KARTHIK

Nursing goal for shift: Maintain neuro status, maintain MAP >65, treat 
agitation/alcohol withdrawal

Plan: Neuro checks as ordered, turn q2, titrate precedex for RASS goal



Goals/Plan for Hospital Stay



Nursing goal for hospital stay:  

Plan:  







Electronically signed by Nicole Lnych RN at 2021  6:16 AM CST

* End of Shift Note - Radha Zuleta RN - 2021  3:53 PM CST



Formatting of this note is different from the original.

End of Shift Summary and Plan of Care



Neuro status unchanged.  CIWA protocol started. Skull x-ray to determine if can 
safely go to MRI. Ultrasound of upper extremities revealed superficial thrombi i
n right arm.  Precedex for RASS goal.  1 mg Ativan for CIWA.



Goals of shift met - see below.



Goals per Patient Condition

Restraints in Use - Patient will remain free from injury related to restraints a
nd will be evaluated for need. See restraint flowsheets for intervention documen
tation.

Fall Prevention Plan  Patient will remain free from injury related to falls. 
See the Daily cares/safety flowsheet for intervention documentation.

Skin Integrity Plan  Patient skin integrity maintained. See integumentary tamela
wsheet for intervention documentation.

High Risk Seizure  Patient will remain free of injury related to seizures. Se
e seizure precaution documentation for interventions.



Goals/Plan for Shift

Patient/Family stated goal for shift: KARTHIK- patient encephalopathic.

Nursing goal for shift: Increased mobility, complete MRI, treat agitation/alcoho
l withdrawal.

Plan: Transport safely to MRI, get patient up to chair, turn q2, assess RASS and
 titrate Precedex to goal.



Goals/Plan for Hospital Stay



Nursing goal for hospital stay:  

Plan:  







Electronically signed by Radha Zuleta RN at 2021  7:15 PM CST

* End of Shift Note - Gila Mcdowell RN - 2021  5:42 AM CST



Formatting of this note is different from the original.

End of Shift Summary and Plan of Care



Neuro status unchanged. Pupils are equal and reactive. Patient remains restless;
 fentanyl given x1 for agitation; precedex continued. 1 unit PRBCs given. No oth
er acute events. 



Goals per Patient Condition

Restraints in Use - Patient will remain free from injury related to restraints a
nd will be evaluated for need. See restraint flowsheets for intervention documen
tation.

Fall Prevention Plan  Patient will remain free from injury related to falls. 
See the Daily cares/safety flowsheet for intervention documentation.

Skin Integrity Plan  Patient skin integrity maintained. See integumentary tamela
wsheet for intervention documentation.



Goals/Plan for Shift

Patient/Family stated goal for shift: karthik

Nursing goal for shift: maintain neuro status and MAP>65; maintain patient 
safety and comfort

Plan: closely monitor neuro exams and BP; bed alarm; side rails x4; titrate prec
edex to maintain RASS -1



Goals/Plan for Hospital Stay



Nursing goal for hospital stay:  

Plan:  







Electronically signed by Gila Mcdowell RN at 2021  5:47 AM CST

* End of Shift Note - Adeline Joshua RN - 2021  7:00 PM CST



Formatting of this note is different from the original.

End of Shift Summary and Plan of Care



PICC placed. TF started. MRI screening form done.



Goals per Patient Condition

Restraints in Use - Patient will remain free from injury related to restraints a
nd will be evaluated for need. See restraint flowsheets for intervention documen
tation.

Fall Prevention Plan  Patient will remain free from injury related to falls. 
See the Daily cares/safety flowsheet for intervention documentation.

Skin Integrity Plan  Patient skin integrity maintained. See integumentary tamela
wsheet for intervention documentation.



Goals/Plan for Shift

Patient/Family stated goal for shift: KARTHIK

Nursing goal for shift: Maintain MAP >65. Maintain neurological status. Maintain
 safety.

Plan: Monitor VS and administer medications as needed. Perform neurological exam
s as ordered. Bed alarm, roll belt, and mittens to maintain safety.



Goals/Plan for Hospital Stay



Nursing goal for hospital stay:  

Plan:  







Electronically signed by Adeline Joshua RN at 2021  7:30 PM CST

* Nutrition Note - Hang Esqueda RD - 2021 11:22 AM CST



Formatting of this note is different from the original.

Nutrition Assessment

[facility]



DIAGNOSIS & INTERVENTION:

DIAGNOSIS 1

Nutrition Diagnosis 1: NI 2.1  Inadequate oral intake

Related To: AMS

As Evidenced By: EMR review, need for EN to meet nutritional needs

Goal: Maintain present weight +/- 5%,TF to provide % of estimated needs un
til pt able to take in >50% of meals consumed

Time Frame: Throughout stay

Goal Status: New goal established

Intervention/Plan 1a: initiate TF with Osmolite 1.5 @ 20 ml/hr and increase as t
olerated by 15 ml q 8 hrs to a goal of 50 ml/hr + 1 prosource pkt per day to pro
vide: 1860 kcal/d, 90 gm pro/day.  FWF of 30 ml q 4 hrs for now. Increase to 145
 ml q 4 hrs depending on Na+ goal to provide: 1784 ml Free H2O once LR is dc'd.

Intervention/Plan 1b: RD will continue to monitor for TF tolerance, weight, labs
 and POC.

 

 

Malnutrition criteria: 

Malnutrition Recommendation - Physician Alert

Malnutrition Rec to Provider: No Recommendation

 

 

 

REASON FOR CONSULT:  TF consult



Patient:  Catrachito Angel

MRN:  [id number]

Age:  71 y.o.

:  1950



PRIMARY CARE PROVIDER: Henrik Allison MD

ATTENDING PHYSICIAN: Francisco Herrmann MD



HISTORY OF PRESENT ILLNESS:  

71-year-old male with a past medical history significant for COPD (on home O2), 
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
t [facility] and right occipital approach  shunt, and hepatitis C who was transferr
ed from an outside ED yesterday evening, 2021, for further evaluation of a
ltered mental status. 



FOOD & NUTRITION RELATED HISTORY:

Diet Order:

Dietary Orders (From admission, onward) 

  Start     Ordered 

 21  Diet NPO  Diet effective now      

 21 2313 

 

 

 



Energy Intake

Total Energy Intake: eating well PTA, has had a decreased appetite staring ~ 6 m
onths ago with DM /insulin change, consult for EN

Fluid / Beverage Intake

Oral Fluids: NPO

 

 



Food Intake

 

 

Meal / Snack Pattern: 2 meals a day usually

 

 



Parenteral Nutrition Intake

 

IV Fluids: precedex, LR at 100 ml/hr



FOOD AND NUTRIENT ADMINISTRATION:

Diet Experience

  

 

Food Allergies: NKFA

 

 

ANTHROPOMETRICS

Height: 167.6 cm (5' 6")

Weight: 59.4 kg (130 lb 15.3 oz)

 

Weight Change: -18.74

BMI (Calculated): 26

 

 

Ideal Body Weight: 64.5 kg (142 lb 4.6 oz)

 

 

% Weight Loss In Weeks: Pt appears to be closer to 160 lb, no family at bedside 
to confirm or address if pt has had any weight changes

 

 NUTRITION FOCUSED PHYSICAL FINDINGS:

Overall Appearance: older adult male in bed

Body Language: not alert on precedex

Cardiovascular - Pulmonary: on NC, hx of COPD on home O2, hx of cerebral aneurys
m

Extremities, Muscles and Bones: RUE moderate edema, + 1 pitting BLE edema

Digestive System (Mouth to Rectum): LBM PTA, abd-soft

Head and Eyes: WDL

Nerves and Cognition: confused, combative at times

Skin: WC consulted, hasn't seen pt , no PI's per doc yet



MEDS AND LABS REVIEWED:

Pertinent Labs:Results for CATRACHITO ANGEL (MRN [id number]) as of 2021 1
1:15

 Ref. Range 2021 21:43 

SODIUM Latest Ref Range: 136 - 145 mEq/L 149 (H) 

POTASSIUM Latest Ref Range: 3.4 - 5.1 mEq/L 4.6 

CHLORIDE Latest Ref Range: 98 - 107 mEq/L 115 (H) 

CARBON DIOXIDE Latest Ref Range: 20 - 31 mEq/L 28 

Anion Gap Latest Ref Range: 5 - 17 mmol/L 6 

Glucose Latest Ref Range: 70 - 100 mg/dL 127 (H) 

Blood Urea Nitrogen Latest Ref Range: 9 - 23 mg/dL 46 (H) 

Creatinine Latest Ref Range: 0.70 - 1.30 mg/dL 1.20 

eGFR Female AA Latest Ref Range: 60.0 - 200.0 mL/min/1.73m*2 53.4 (L) 

eGFR Female Non-AA Latest Ref Range: 60.0 - 200.0 mL/min/1.73m*2 44.0 (L) 

eGFR Male AA Latest Ref Range: 60.0 - 200.0 mL/min/1.73m*2 71.9 

eGFR Male Non-AA Latest Ref Range: 60.0 - 200.0 mL/min/1.73m*2 59.3 (L) 

CALCIUM Latest Ref Range: 8.3 - 10.6 mg/dL 9.6 



Pertinent Meds: 

Scheduled Meds:

 acyclovir  10 mg/kg (Ideal) Intravenous Q12H MARQUISE 

 ampicillin  2 g Intravenous Q6H MARQUISE 

 cefepime  2 g Intravenous Q12H MARQUISE 

 famotidine  20 mg Oral Q12H 

 Or 

 famotidine  20 mg Intravenous Q12H 

 ipratropium-albuteroL  3 mL Inhalation 4x daily 

 methylPREDNISolone sodium succinate  40 mg Intravenous Q8H MARQUISE 

 senna-docusate  1 tablet Oral BID 

 vancomycin  1,000 mg Intravenous Q12H 



Continuous Infusions:

 dexmedeTOMIDINE 0.8 mcg/kg/hr (21 1058) 

 lactated Ringers 100 mL/hr (21 1005) 



PRN Meds:alteplase, dextrose 50%, glucagon **OR** glucagon, glucose, hydrALAZINE
, lidocaine, ondansetron, prochlorperazine **OR** prochlorperazine **OR** prochl
orperazine



Intake/Output Summary (Last 24 hours) at 2021 1123

Last data filed at 2021 1100

Gross per 24 hour 

Intake 3705.31 ml 

Output 710 ml 

Net 2995.31 ml 



NUTRITION PRESCRIPTION:

Estimated Energy Needs

Total Energy Estimated Needs: 2579-1093 kcal/d

Method for Estimating Needs: MSJ (sed-light)



Estimated Protein Needs

Total Protein Estimated Needs: +/=88 gm/day

 

Method for Estimating Needs: +/=1.2gm/kg



Fluid Needs

Total Fluid Estimated Needs: 1ml/kcal or per MD

 

MONITORING/EVALUATION:

1.  Food & Nutrition Related Hx:  Energy Intake

2.  Anthropometrics:  Weight change

3.  Biochemical: Nutrition related labs

4.  Nutrition-focused physical findings: GI function, skin



Electronically signed by Hang Esqueda 2021 11:23 AM





Electronically signed by Hang Esqueda RD at 2021 11:26 AM CST

* Care Progression Initial Assessment - Ramona Hunt RN - 2021 10:15
   AM CST



Formatting of this note is different from the original.

Care Progression Initial Assessment



Discharge Plan  



  Care Progression Plan: Pt's DC plan TBD at this time, pending course of stay.



Final discharge plan is pending treatment team recommendations and patient's jaya
ntified needs after patient is no longer in critical care status. 



CC spoke with pt's cousin Steven via phone, d/t pt's condition. CC explained role.
 Pt's cousin verified demographics, PCP, pharmacy, and stated medications are af
fordable as far as he knew. Pt's cousin stated pt lives alone in the basement of
 a ranch style home; 4-5 steps into and 4-5 inside. "He is a very interesting pe
rson. He had lived in this house with his parents, both of whom are since passed
. He stayed in the house and it definitely isn't the most clean situation. We've
 tried to help but he won't let you. There has also been some alcohol bottles fo
und."



Pt's cousin stated pt is normally independent with ADL's, drives, has never used
 HH, and has never stayed at a SNF. Pt's cousin denied any questions or concerns
 at this time. CC/SW contact information provided. Care Progression will continu
e to follow for anticipated discharge needs. 



Patient Information  



Information Obtained: Pt's cousin and EMR



Primary Caregiver : Self



Support Systems: Extended family



Living Arrangements: Alone



Type of Residence: Private residence without support,Private residence with supp
ort



Current Home Health Services: No



Transportation 



 Transportation at Discharge: Other (TBD)



 Transportation at Appointments: Other (TBD)



Functional Capacity & DME 



Assistive Devices: None



Respiratory Items: Oxygen



Current & Past Services 



Current Resources Available: Rx Coverage



Type of Rx Coverage: [employer] Benefits



Financial/Income Information 



Financial Hardship: N/A



Verified that patients primary care physician is Henrik Allison MD and receives 
eir medications from 

[id number] - [place], [place] - [address]  

[address] [address]

Phone: [phone] Fax: [phone]

   



Ramona Hunt RN 

NSICU/CVICU Care Coordinator 

[email or web address] 

Office: [phone] 

Voalte: [phone]



Electronically signed by Ramona Hunt RN at 2021  1:02 PM CST

* End of Shift Note - Gila Mcdowell RN - 2021  5:36 AM CST



Formatting of this note is different from the original.

End of Shift Summary and Plan of Care



Patient remains localizing to pain in all extremities. Pupils remain unequal; L 
pupil 5mm and unreactive from 6828-9973; ALONZO aware. Haldol and fentanyl given fo
r agitation; See MAR; Precedex continued. Pt was hypotensive towards beginning o
f shift; 500ml bolus given. Pt on 3L NC. Decreased urine output throughout shift
; ALONZO aware. No other acute events. 



Goals per Patient Condition

Restraints in Use - Patient will remain free from injury related to restraints a
nd will be evaluated for need. See restraint flowsheets for intervention documen
kalie.

Fall Prevention Plan  Patient will remain free from injury related to falls. 
See the Daily cares/safety flowsheet for intervention documentation.

Skin Integrity Plan  Patient skin integrity maintained. See integumentary tamela
wsheet for intervention documentation.



Goals/Plan for Shift

Patient/Family stated goal for shift: KARTHIK

Nursing goal for shift: maintain MAP>65; maintain neuro status; maintain patient
 safety

Plan: closely monitor neuro exams and BP; bed alarm; restraint monitoring



Goals/Plan for Hospital Stay



Nursing goal for hospital stay:  

Plan:  







Electronically signed by Gila Mcdowell RN at 2021  7:04 AM CST

* End of Shift Note - Angie Boles RN - 2021  7:05 PM CST



Formatting of this note is different from the original.

End of Shift Summary and Plan of Care

NGT placed, multiple PRNs given, echo done, CT abd/pelvis, chest, full spine, an
d VEEG completed. LP at the bedside was not successful, IR consulted and attempt
ed to complete LP in IR, also unsuceesful. Multiple PRNs given for scans and pro
cedures. Precedex and IVMF started. Pt cold upon return to NSICU, huber peng
plied. Code status changed to DNR



Goals per Patient Condition

Restraints in Use - Patient will remain free from injury related to restraints a
nd will be evaluated for need. See restraint flowsheets for intervention documen
tation.

Fall Prevention Plan  Patient will remain free from injury related to falls. 
See the Daily cares/safety flowsheet for intervention documentation.

Skin Integrity Plan  Patient skin integrity maintained. See integumentary tamela
wsheet for intervention documentation.



Goals/Plan for Shift

Patient/Family stated goal for shift: KARTHIK

Nursing goal for shift: Keep pt comfortable and safe and complete scans

Plan: Use PRNs, partner with team for scans



Goals/Plan for Hospital Stay



Nursing goal for hospital stay:  

Plan:  







Electronically signed by Angie Boles RN at 2021  7:13 PM CST

* End of Shift Note - Moody Meade RN - 2021  1:43 AM CST



Formatting of this note is different from the original.

End of Shift Summary and Plan of Care



Pt admitted to the NSICU with UTI, SDH, AMS.  Pt was found down by his neighbor 
without his o2 and unresponsive.  Pt is uncooperative with cares and will not pa
rticipate in assessments.  



Arrived with a castellano in place noted 40-65/hr out.  Pt wears 3L chronically at ho
me is on 3-4L per NC.  



Taken down for repeat head CT around 0500.  



Pt was given [facility]Centra.  Orders for 1 unit PRBC and 2 FFP.  Currently is getting 1
 unit of PRBC and 1 of FFP.    



Goals per Patient Condition



Fall Prevention Plan  Patient will remain free from injury related to falls. 
See the Daily cares/safety flowsheet for intervention documentation.

Skin Integrity Plan  Patient skin integrity maintained. See integumentary tamela
wsheet for intervention documentation.



Goals/Plan for Shift

Patient/Family stated goal for shift:  

Nursing goal for shift:  

Plan:  



Goals/Plan for Hospital Stay



Nursing goal for hospital stay:  

Plan:  







Electronically signed by Moody Meade RN at 2021  5:51 AM CST

documented in this encounter



Plan of Treatment





                                        Date/Time



                 Name            Type            Priority        Associated Diag

noses 

 

                                        2021  5:21 AM CST



                     Transfuse plasma    Transfuse           Routine  



                                         Orders   

 

                                        2021  5:17 AM CST



                     Transfuse plasma    Transfuse           Routine  



                                         Orders   

 

                                        2021  3:25 AM CST



                     Transfuse RBC       Transfuse           Routine  



                                         Orders   

 

                                        2021  3:23 AM CST



                     Transfuse RBC       Transfuse           Routine  



                                         Orders   

 

                                        2021  7:32 AM CST



                     Transfuse plasma    Transfuse           Routine  



                                         Orders   

 

                                        2021  9:09 PM CST



                     Vitamin B1 (Thiamine)  Lab                 Routine  

 

                                        2021 11:50 PM CST



                     EXTRA TUBES         Lab                 Routine  

 

                                        2021 11:50 PM CST



                     Green Top           Lab                 Timed  

 

                                        2021 10:01 AM CST



                     Paraneoplastic Maira  Lab                 Routine  

 

                                        2021 10:01 AM CST



                     Thyroglobulin Antibody  Lab                 Routine  

 

                                        2021  8:36 AM CST



                     XR Chest single view  Imaging             Routine  



                                         frontal    







                                        Order Schedule



                 Name            Type            Priority        Associated Diag

noses 

 

                                        Continuous for 1 Occurrences starting  until 2021



                     Respiratory: ABG PRN  Respiratory         Routine  



                                         Care   

 

                                        Continuous for 1 Occurrences starting  until 2021



                     Incentive spirometry  Respiratory         Routine  



                                         Care   

 

                                        As Needed until discontinued starting 



                     Glucose POC         Point of Care       Routine  



                                         Testing-Docked   



                                         Device   

 

                                        Once - Routine for 1 Occurrences startin

g 2021 until 2021



                     Glucose CSF         Lab                 Routine  

 

                                        Once - Routine for 1 Occurrences startin

g 2021 until 2021



                     Protein CSF         Lab                 Routine  

 

                                        Once - Routine for 1 Occurrences startin

g 2021 until 2021



                     Culture, Spinal Fluid  Microbiology        Routine  



                                         with Gram Stain    

 

                                        Once - Routine for 1 Occurrences startin

g 2021 until 2021



                     Meningitis/Encephalitis  Lab                 Routine  



                                         CSF PCR    

 

                                        Once - Routine for 1 Occurrences startin

g 2021 until 2021



                     CSF Cell Count and  Lab                 Routine  



                                         Differential    

 

                                        Once - Routine for 1 Occurrences startin

g 2021 until 2021



                     West Nile Virus IgG+IgM  Lab                 Routine  



                                         CSF    

 

                                        Once for 1 Occurrences starting 20 until 2021



                     Respiratory Criteria  Respiratory         Routine  



                                         Care   

 

                                        One time imaging One time imaging for 1 

Occurrences starting 2021 

until 2021



                     MRI Head w wo contrast  Imaging             Routine  

 

                                        Every 6hr until discontinued starting 



                     Glucose POC         Point of Care       Routine  



                                         Testing-Docked   



                                         Device   

 

                                        Once - Routine for 1 Occurrences startin

g 2021 until 2021



                     Vitamin B1 (Thiamine)  Lab                 Routine  

 

                                        Once - Routine for 1 Occurrences startin

g 2021 until 2021



                     EXTRA TUBES         Lab                 Routine  

 

                                        Once for 1 Occurrences starting 20 until 2021



                     Green Top           Lab                 Timed  

 

                                        Once - Routine for 1 Occurrences startin

g 2021 until 2021



                     Paraneoplastic Maira  Lab                 Routine  

 

                                        Once - Routine for 1 Occurrences startin

g 2021 until 2021



                     Thyroglobulin Antibody  Lab                 Routine  

 

                                        Every 12hr until discontinued starting 1

2021, 2 completed



                     Sodium              Lab                 Routine  

 

                                        One time imaging One time imaging for 1 

Occurrences starting 2021 

until 2021



                     XR Chest single view  Imaging             Routine  



                                         frontal    



documented as of this encounter



Procedures

The patient is currently admitted. The information in this section might not be 
complete until the patient is discharged.







                                        Comments



                 Procedure Name  Priority        Date/Time       Associated Diag

nosis 

 

                                        



Results for this procedure are in the results section.



                     ARTERIAL BLOOD GAS  STAT                2021  



                                         9:35 AM CST  

 

                                        



Results for this procedure are in the results section.



                     VANCOMYCIN TROUGH   Timed               2021  



                                         8:02 AM CST  

 

                                        



Results for this procedure are in the results section.



                     SODIUM              Routine             2021  



                                         8:02 AM CST  

 

                                        



Results for this procedure are in the results section.



                     GLUCOSE POC         Timed               2021  



                                         5:30 AM CST  

 

                                        



Results for this procedure are in the results section.



                     GLUCOSE POC         Timed               2021  



                                         12:22 AM CST  

 

                                        



Results for this procedure are in the results section.



                     CBC AND DIFF (MANUAL DIFF  Routine             2021  



                           IF NECESSARY)             12:18 AM CST  

 

                                        



Results for this procedure are in the results section.



                     BASIC METABOLIC PANEL  Routine             2021  



                                         12:18 AM CST  

 

                                        



Results for this procedure are in the results section.



                     ALBUMIN             Add-On              2021  



                                         12:18 AM CST  

 

                                        



Results for this procedure are in the results section.



                     GLUCOSE POC         Timed               2021  



                                         5:46 PM CST  

 

                                        



Results for this procedure are in the results section.



                     SODIUM              Routine             2021  



                                         3:11 PM CST  

 

                                        



Results for this procedure are in the results section.



                     GLUCOSE POC         Timed               2021  



                                         11:35 AM CST  

 

                                        



Results for this procedure are in the results section.



                     THYROID PEROXIDASE  Routine             2021  



                           ANTIBODY                  10:01 AM CST  

 

                                        



Results for this procedure are in the results section.



                     SODIUM              Routine             2021  



                                         8:27 AM CST  

 

                                        



Results for this procedure are in the results section.



                     GLUCOSE POC         Timed               2021  



                                         6:16 AM CST  

 

                                        



Results for this procedure are in the results section.



                     AMMONIA             Routine             2021  



                                         12:14 AM CST  

 

                                        



Results for this procedure are in the results section.



                     GLUCOSE POC         Timed               2021  



                                         12:05 AM CST  

 

                                        



Results for this procedure are in the results section.



                     RENAL PANEL         Routine             2021  



                                         11:50 PM CST  

 

                                        



Results for this procedure are in the results section.



                     MAGNESIUM           Routine             2021  



                                         11:50 PM CST  

 

                                        



Results for this procedure are in the results section.



                     COMPLETE BLOOD COUNT  Routine             2021  



                                         11:50 PM CST  

 

                                        



Results for this procedure are in the results section.



                     SODIUM              Routine             2021  



                                         7:45 PM CST  

 

                                        



Results for this procedure are in the results section.



                     GLUCOSE POC         Timed               2021  



                                         5:20 PM CST  

 

                                        



Results for this procedure are in the results section.



                     GLUCOSE POC         Timed               2021  



                                         3:52 PM CST  

 

                                        



Results for this procedure are in the results section.



                     XR SKULL < 4 VIEWS  Routine             2021  



                                         3:00 PM CST  

 

                                        



Results for this procedure are in the results section.



                     VITAMIN D, 25-HYDROXY  Routine             2021  



                                         12:38 PM CST  

 

                                        



Results for this procedure are in the results section.



                     THYROID STIMULATING  Routine             2021  



                           HORMONE                   12:38 PM CST  

 

                                        



Results for this procedure are in the results section.



                     GLUCOSE POC         Timed               2021  



                                         11:23 AM CST  

 

                                        



Results for this procedure are in the results section.



                     CREATINE KINASE     Routine             2021  



                                         10:35 AM CST  

 

                                        



Results for this procedure are in the results section.



                     BILIRUBIN DIRECT    Routine             2021  



                                         10:35 AM CST  

 

                                        



Results for this procedure are in the results section.



                     B12/FOLATE          Add-On              2021  



                                         10:35 AM CST  

 

                                        



Results for this procedure are in the results section.



                     US VENOUS DUPLEX UPPER  Routine             2021  



                           EXTREMITY BILAT           9:37 AM CST  

 

                                        



Results for this procedure are in the results section.



                     VANCOMYCIN TROUGH   Timed               2021  



                                         8:03 AM CST  

 

                                        



Results for this procedure are in the results section.



                     SODIUM              Routine             2021  



                                         8:03 AM CST  

 

                                        



Results for this procedure are in the results section.



                     RAPID PLASMA REAGIN  Add-On              2021  



                                         8:03 AM CST  

 

                                        



Results for this procedure are in the results section.



                     GLUCOSE POC         Timed               2021  



                                         7:32 AM CST  

 

                                        



Results for this procedure are in the results section.



                     HEMOGLOBIN AND HEMATOCRIT  Timed               2021  



                                         6:27 AM CST  

 

                                        



Results for this procedure are in the results section.



                     CROSSMATCH (PREPARE ONE  Timed               2021  



                           UNIT) RBC                 6:10 AM CST  

 

                                        



Results for this procedure are in the results section.



                     GLUCOSE POC         Timed               2021  



                                         4:14 AM CST  

 

                                        



Results for this procedure are in the results section.



                     PROTHROMBIN TIME/INR  Routine             2021  



                                         12:29 AM CST  

 

                                        



Results for this procedure are in the results section.



                     MAGNESIUM           Routine             2021  



                                         12:29 AM CST  

 

                                        



Results for this procedure are in the results section.



                     COMPREHENSIVE METABOLIC  Routine             2021  



                           PANEL                     12:29 AM CST  

 

                                        



Results for this procedure are in the results section.



                     COMPLETE BLOOD COUNT  Routine             2021  



                                         12:29 AM CST  

 

                                        



Results for this procedure are in the results section.



                     GLUCOSE POC         Timed               2021  



                                         12:19 AM CST  

 

                                        



Results for this procedure are in the results section.



                     GLUCOSE POC         Timed               2021  



                                         8:29 PM CST  

 

                                        



Results for this procedure are in the results section.



                     GLUCOSE POC         Timed               2021  



                                         4:16 PM CST  

 

                                        



Results for this procedure are in the results section.



                     GLUCOSE POC         Timed               2021  



                                         12:23 PM CST  

 

                                        



Results for this procedure are in the results section.



                     HEMOGLOBIN AND HEMATOCRIT  Routine             2021  



                                         12:23 PM CST  

 

                                        



Results for this procedure are in the results section.



                     STREP PNEUMONIAE URINE  Routine             2021  



                           ANTIGEN                   9:47 AM CST  

 

                                        



Results for this procedure are in the results section.



                     GLUCOSE POC         Timed               2021  



                                         8:35 AM CST  

 

                                        



Results for this procedure are in the results section.



                     PROTHROMBIN TIME/INR  STAT                2021  



                                         6:40 AM CST  

 

                                        



Results for this procedure are in the results section.



                     CROSSMATCH (PREPARE ONE  Timed               2021  



                           UNIT) RBC                 6:10 AM CST  

 

                                        



Results for this procedure are in the results section.



                     PREPARE PLASMA      Timed               2021  



                                         6:10 AM CST  

 

                                        



Results for this procedure are in the results section.



                     GLUCOSE POC         Timed               2021  



                                         4:45 AM CST  

 

                                         



                     OXYGEN              Routine             2021  



                                         1:57 AM CST  

 

                                        



Results for this procedure are in the results section.



                     CBC AND DIFF (MANUAL DIFF  Routine             2021  



                           IF NECESSARY)             12:28 AM CST  

 

                                        



Results for this procedure are in the results section.



                     GLUCOSE POC         Timed               2021  



                                         12:12 AM CST  

 

                                        



Results for this procedure are in the results section.



                     PHOSPHORUS          Routine             2021  



                                         9:43 PM CST  

 

                                        



Results for this procedure are in the results section.



                     MAGNESIUM           Routine             2021  



                                         9:43 PM CST  

 

                                        



Results for this procedure are in the results section.



                     LACTATE VENOUS WB   STAT                2021  



                                         9:43 PM CST  

 

                                        



Results for this procedure are in the results section.



                     BASIC METABOLIC PANEL  Routine             2021  



                                         9:43 PM CST  

 

                                        



Results for this procedure are in the results section.



                     GLUCOSE POC         Timed               2021  



                                         8:05 PM CST  

 

                                        



Results for this procedure are in the results section.



                     GLUCOSE POC         Timed               2021  



                                         6:01 PM CST  

 

                                        



Results for this procedure are in the results section.



                     CT LUMBAR SPINE     ASAP                2021  



                           RECONSTRUCTED             5:46 PM CST  

 

                                        



Results for this procedure are in the results section.



                     CT ABDOMEN PELVIS WO  ASAP                2021  



                           CONTRAST                  5:46 PM CST  

 

                                        



Results for this procedure are in the results section.



                     CT CERVICAL SPINE WO  ASAP                2021  



                           CONTRAST                  5:44 PM CST  

 

                                        



Results for this procedure are in the results section.



                     IR LUMBAR PUNCTURE W  Routine             2021  



                           FLUORO                    5:09 PM CST  

 

                                        



Results for this procedure are in the results section.



                     GLUCOSE POC         Timed               2021  



                                         4:08 PM CST  

 

                                        



Results for this procedure are in the results section.



                     EEG PROLONGED (> 60 MIN)  Routine             2021  



                                         2:52 PM CST  

 

                                        



Results for this procedure are in the results section.



                     CT THORACIC SPINE   ASAP                2021  



                           RECONSTRUCTED             1:06 PM CST  

 

                                        



Results for this procedure are in the results section.



                     HEMOGLOBIN AND HEMATOCRIT  Routine             2021  



                                         12:26 PM CST  

 

                                        



Results for this procedure are in the results section.



                     AMMONIA             STAT                2021  



                                         12:01 PM CST  

 

                                        



Results for this procedure are in the results section.



                     CLOTTING SCREEN     Routine             2021  



                                         12:00 PM CST  

 

                                        



Results for this procedure are in the results section.



                     TROPONIN-I HS 0HR   Routine             2021  



                                         11:38 AM CST  

 

                                        



Results for this procedure are in the results section.



                     GLUCOSE POC         Timed               2021  



                                         11:33 AM CST  

 

                                        



Results for this procedure are in the results section.



                     ECHO COMPLETE W DOPPLER  Routine             2021  



                           AND COLOR FLOW            11:13 AM CST  

 

                                        



Results for this procedure are in the results section.



                     ARTERIAL BLOOD GAS  STAT                2021  



                                         11:10 AM CST  

 

                                        



Results for this procedure are in the results section.



                     CT CHEST WO CONTRAST  STAT                2021  



                                         10:35 AM CST  

 

                                        



Results for this procedure are in the results section.



                     XR SKULL < 4 VIEWS  Routine             2021  



                                         9:46 AM CST  

 

                                        



Results for this procedure are in the results section.



                     XR ABDOMEN SINGLE VIEW AP  STAT                2021  



                                         9:37 AM CST  

 

                                        



Results for this procedure are in the results section.



                     EXTRA TUBES         Routine             2021  



                                         9:29 AM CST  

 

                                         



                     GREEN TOP           Timed               2021  



                                         9:29 AM CST  

 

                                        



Results for this procedure are in the results section.



                     XR CHEST SINGLE VIEW  ASAP                2021  



                           FRONTAL                   8:56 AM CST  

 

                                        



Results for this procedure are in the results section.



                     LACTATE VENOUS WB   STAT                2021  



                                         7:53 AM CST  

 

                                        



Results for this procedure are in the results section.



                     GLUCOSE POC         Timed               2021  



                                         7:40 AM CST  

 

                                         



                     TRANSFUSE PLASMA    Routine             2021  



                                         7:32 AM CST  

 

                                        



Results for this procedure are in the results section.



                     ECG                 Routine             2021  



                                         6:29 AM CST  

 

                                         



                     TRANSFUSE PLASMA    Routine             2021  



                                         5:17 AM CST  

 

                                        



Results for this procedure are in the results section.



                     TROPONIN-I HS 6HR   Timed               2021  



                                         5:14 AM CST  

 

                                        



Results for this procedure are in the results section.



                     HEPATIC FUNCTION PANEL  STAT                2021  



                           Add-On                    5:14 AM CST  

 

                                        



Results for this procedure are in the results section.



                     CT HEAD WO CONTRAST  Timed               2021  



                                         4:59 AM CST  

 

                                        



Results for this procedure are in the results section.



                     GLUCOSE POC         Timed               2021  



                                         4:16 AM CST  

 

                                         



                     TRANSFUSE RED BLOOD CELLS  Routine             2021  



                           ONE UNIT                  3:23 AM CST  

 

                                        



Results for this procedure are in the results section.



                     ECG                 Routine             2021  



                                         2:43 AM CST  

 

                                        



Results for this procedure are in the results section.



                     LACTATE VENOUS WB   Routine             2021  



                                         2:31 AM CST  

 

                                        



Results for this procedure are in the results section.



                     CREATINE KINASE     Routine             2021  



                                         2:31 AM CST  

 

                                        



Results for this procedure are in the results section.



                     ANTIBODY SCREEN     Timed               2021  



                                         1:04 AM CST  

 

                                        



Results for this procedure are in the results section.



                     TYPE AND SCREEN     Routine             2021  



                                         1:04 AM CST  

 

                                        



Results for this procedure are in the results section.



                     ABORH TYPE          Timed               2021  



                                         1:04 AM CST  

 

                                        



Results for this procedure are in the results section.



                     TROPONIN-I HS 2HR   Timed               2021  



                                         1:02 AM CST  

 

                                        



Results for this procedure are in the results section.



                     RETYPE PATIENT ABORH  Routine             2021  



                                         1:02 AM CST  

 

                                        



Results for this procedure are in the results section.



                     RETYPE PATIENT ABORH  Routine             2021  



                                         1:02 AM CST  

 

                                        



Results for this procedure are in the results section.



                     PROCALCITONIN       Routine             2021  



                                         1:02 AM CST  

 

                                        



Results for this procedure are in the results section.



                     COMPLETE BLOOD COUNT  Routine             2021  



                                         1:02 AM CST  

 

                                        



Results for this procedure are in the results section.



                     GLUCOSE POC         Timed               2021  



                                         12:25 AM CST  

 

                                        



Results for this procedure are in the results section.



                     CT HEAD WO CONTRAST  STAT                2021  



                                         11:59 PM CST  

 

                                        



Results for this procedure are in the results section.



                     CREATINE KINASE     STAT                2021  



                                         11:25 PM CST  

 

                                        



Results for this procedure are in the results section.



                     COMPREHENSIVE METABOLIC  Routine             2021  



                           PANEL                     11:25 PM CST  

 

                                        



Results for this procedure are in the results section.



                     ARTERIAL BLOOD GAS  STAT                2021  



                                         11:25 PM CST  

 

                                        



Results for this procedure are in the results section.



                     EXTRA URINE SPECIMEN IN  Routine             2021  



                           NEW TUBE                 11:16 PM CST  

 

                                        



Results for this procedure are in the results section.



                     ECG                 STAT                2021  



                                         11:14 PM CST  

 

                                        



Results for this procedure are in the results section.



                     SARS-COV-2 (COVID-19)  Routine             2021  



                                         11:10 PM CST  

 

                                        



Results for this procedure are in the results section.



                     URINALYSIS MICROSCOPIC  ASAP                2021  



                           ONLY                      11:09 PM CST  

 

                                        



Results for this procedure are in the results section.



                     URINALYSIS (INCLUDES  STAT                2021  



                           MICROSCOPIC REVIEW, IF    11:09 PM CST  



                                         INDICATED)    

 

                                        



Results for this procedure are in the results section.



                     TOXICOLOGY SCREENING  STAT                2021  



                           PANEL                     11:09 PM CST  

 

                                        



Results for this procedure are in the results section.



                     XR PELVIS ONE OR TWO  STAT                2021  



                           VIEWS                     11:08 PM CST  

 

                                        



Results for this procedure are in the results section.



                     XR CHEST SINGLE VIEW  STAT                2021  



                           FRONTAL                   11:07 PM CST  

 

                                        



Results for this procedure are in the results section.



                     CLOTTING SCREEN     STAT                2021  



                                         10:49 PM CST  

 

                                        



Results for this procedure are in the results section.



                     COMPREHENSIVE METABOLIC  STAT                2021  



                           PANEL                     10:49 PM CST  

 

                                        



Results for this procedure are in the results section.



                     CBC AND DIFF (MANUAL DIFF  STAT                2021  



                           IF NECESSARY)             10:49 PM CST  

 

                                        



Results for this procedure are in the results section.



                     TROPONIN-I HS 0HR   STAT                2021  



                                         10:49 PM CST  

 

                                        



Results for this procedure are in the results section.



                     ED PROCEDURE BASIC -  Routine             2021  



                           CRITICAL CARE             10:21 PM CST  



documented in this encounter



Results

* Arterial Blood Gas (2021  9:35 AM CST)



Only the most recent of 3 results within the time period is included.



    



              Component    Value        Ref Range    Performed At  Pathologist



                                         Signature

 

    



                 PH Arterial     7.26 (L)        7.35 - 7.45 units  [facility] 

 

    



                 PCO2 Arterial   75 (HH)         35 - 45 mmHg    [facility] 

 

    



                 PO2 Arterial    85              80 - 100 mmHg   [facility] 

 

    



                 Bicarbonate     33.7 (H)        19.0 - 29.0 mEq/L  [facility] 

 

    



                 Base Excess     4.1 (H)         -3.0 - 3.0 mEq/L  [facility] 

 

    



                     Sample site         Left Radial         SLRL 

 

    



                     Collateral          Yes                 SLRL 



                                         Circulation    

 

    



                     Device              Oxymask             SLRL 

 

    



                 LPM             3               L/min           SLRL 

 

    



                 Total Rate      24              bpm             SLRL 











                                         Specimen

 





                                         Blood - Arterial







   



                 Performing Organization  Address         City/[facility]/ZIP Code  P

kathleen Number

 

   



                     SLRL                [address] [address]

11 





* Sodium (2021  8:02 AM CST)



Only the most recent of 5 results within the time period is included.



    



              Component    Value        Ref Range    Performed At  Pathologist



                                         Signature

 

    



                 Sodium          154 (H)         136 - 145 mEq/L  SL[facility] 











                                         Specimen

 





                                         Blood - Central







   



                 Performing Organization  Address         City/[facility]/ZIP Code  P

kathleen Number

 

   



                     SL[facility]                [address] [address]

11 





* Vancomycin Trough (2021  8:02 AM CST)



Only the most recent of 2 results within the time period is included.



    



              Component    Value        Ref Range    Performed At  Pathologist



                                         Signature

 

    



                 Vancomycin      22.0 (H)        10.0 - 20.0 ug/mL  SL[facility] 



                                         Trough    











                                         Specimen

 





                                         Blood - Central







   



                 Performing Organization  Address         City/[facility]/ZIP Code  P

kathleen Number

 

   



                     SLRL                [address] [address]

11 





* GLUCOSE POC (2021  5:30 AM CST)



Only the most recent of 25 results within the time period is included.



    



              Component    Value        Ref Range    Performed At  Pathologist



                                         Signature

 

    



                 Glucose POC     133 (H)         70 - 100 mg/dL  [facility] 











                                         Specimen

 





                                         Blood - Venous







   



                 Performing Organization  Address         City/[facility]/ZIP Code  P

kathleen Number

 

   



                     SLRL                [address] [address]

11 





* Albumin (2021 12:18 AM CST)



    



              Component    Value        Ref Range    Performed At  Pathologist



                                         Signature

 

    



                 Albumin         3.3 (L)         3.5 - 5.0 g/dL  [facility] 











                                         Specimen

 





                                         Blood - Venous







   



                 Performing Organization  Address         City/[facility]/ZIP Code  P

kathleen Number

 

   



                     SLRL                [address] [address]

11 





* CBC and Diff (manual diff if necessary) (2021 12:18 AM CST)



Only the most recent of 3 results within the time period is included.



    



              Component    Value        Ref Range    Performed At  Pathologist



                                         Signature

 

    



                 WBC             11.12 (H)       4.00 - 11.00 TH/uL  [facility] 

 

    



                 RBC             3.20 (L)        4.31 - 5.84 mil/uL  [facility] 

 

    



                 Hemoglobin      7.9 (L)         13.0 - 17.0 g/dL  [facility] 

 

    



                 Hematocrit      29 (L)          40 - 50 %       [facility] 

 

    



                 MCV             90              80 - 99 fL      [facility] 

 

    



                 MCH             25 (L)          27 - 34 pg      [facility] 

 

    



                 MCHC            27 (L)          32 - 36 %       [facility] 

 

    



                 RDW             20.0 (H)        9.0 - 14.5 %    [facility] 

 

    



                 Platelet Count  147             140 - 400 Th/uL  [facility] 

 

    



                 MPV             12.1            9.4 - 12.3 fL   SL[facility] 

 

    



                 Nucleated RBCs  0               0 - 0 /100 WBC  SL[facility] 

 

    



                 % Neutrophils   83 (H)          45 - 78 %       [facility] 

 

    



                 % Lymphocytes   5 (L)           15 - 47 %       [facility] 

 

    



                 % Monocytes     8               0 - 12 %        [facility] 

 

    



                 % Eosinophils   4               0 - 7 %         [facility] 

 

    



                 % Basophils     0               0 - 2 %         [facility] 

 

    



                 % Imm Grans     1               0 - 1 %         [facility] 

 

    



                 # Granulocytes  9.34 (H)        1.70 - 6.80 TH/uL  [facility] 

 

    



                 # Lymphocytes   0.57 (L)        1.00 - 3.30 TH/uL  [facility] 

 

    



                 # Monocytes     0.87            0.20 - 0.90 TH/uL  [facility] 

 

    



                 # Eosinophils   0.39            0.00 - 0.40 TH/uL  [facility] 

 

    



                 # Basophils     0.01            0.00 - 0.10 TH/uL  [facility] 











                                         Specimen

 





                                         Blood - Venous







   



                 Performing Organization  Address         City/State/ZIP Code  P

kathleen Number

 

   



                     RL                [address] [address]

11 





* Basic Metabolic Panel (2021 12:18 AM CST)



Only the most recent of 2 results within the time period is included.



    



              Component    Value        Ref Range    Performed At  Pathologist



                                         Signature

 

    



                 Sodium          154 (H)         136 - 145 mEq/L  [facility] 

 

    



                 Potassium       4.4             3.4 - 5.1 mEq/L  [facility] 

 

    



                 Chloride        120 (H)         98 - 107 mEq/L  [facility] 

 

    



                 Carbon Dioxide  32 (H)          20 - 31 mEq/L   [facility] 

 

    



                 Anion Gap       2 (L)           5 - 17 mmol/L   [facility] 

 

    



                 Calcium         8.2 (L)         8.3 - 10.6 mg/dL  [facility] 

 

    



                 Glucose         123 (H)         70 - 100 mg/dL  [facility] 

 

    



                 Blood Urea      36 (H)          9 - 23 mg/dL    [facility] 



                                         Nitrogen    

 

    



                 Creatinine      1.00            0.70 - 1.30 mg/dL  [facility] 

 

    



                 eGFR Male       73.2            60.0 - 200.0    [facility] 



                           Non-AA                    mL/min/1.73m*2  

 

    



                 eGFR Male AA    88.8            60.0 - 200.0    SL[facility] 



                                         mL/min/1.73m*2  

 

    



                 eGFR Female     54.3 (L)        60.0 - 200.0    SL[facility] 



                           Non-AA                    mL/min/1.73m*2  

 

    



                 eGFR Female AA  65.9            60.0 - 200.0    SL[facility] 



                                         mL/min/1.73m*2  











                                         Specimen

 





                                         Blood - Venous







   



                 Performing Organization  Address         City/[facility]/ZIP Code  P

kathleen Number

 

   



                     SLRL                [address] [address]

11 





* Thyroid Peroxidase Antibody (2021 10:01 AM CST)



    



              Component    Value        Ref Range    Performed At  Pathologist



                                         Signature

 

    



                 Thyroid         <28.0           0.0 - 60.0 IU/mL  [facility] 



                                         Peroxidase    



                                         Antibody    











                                         Specimen

 





                                         Blood - Venous







                                        Narrative

 

                                        



[facility] - 2021 11:11 AM CST



Reference range updated 10/24/21 with [facility] conversion to [employer] 
instrumentation.







   



                 Performing Organization  Address         City/[facility]/ZIP Code  P

kathleen Number

 

   



                     SLRL                [address] [address]

11 





* Ammonia (2021 12:14 AM CST)



Only the most recent of 2 results within the time period is included.



    



              Component    Value        Ref Range    Performed At  Pathologist



                                         Signature

 

    



                 Ammonia         12              11 - 32 umol/L  [facility] 











                                         Specimen

 





                                         Blood - Venous







   



                 Performing Organization  Address         City/[facility]/ZIP Code  P

kathleen Number

 

   



                     SL[facility]                [address] [address]

11 





* Complete Blood Count (2021 11:50 PM CST)



Only the most recent of 3 results within the time period is included.



    



              Component    Value        Ref Range    Performed At  Pathologist



                                         Signature

 

    



                 WBC             10.85           4.00 - 11.00 TH/uL  [facility] 

 

    



                 RBC             3.34 (L)        4.31 - 5.84 mil/uL  [facility] 

 

    



                 Hemoglobin      8.0 (L)         13.0 - 17.0 g/dL  [facility] 

 

    



                 Hematocrit      29 (L)          40 - 50 %       [facility] 

 

    



                 MCV             87              80 - 99 fL      [facility] 

 

    



                 MCH             24 (L)          27 - 34 pg      [facility] 

 

    



                 MCHC            27 (L)Comment: A review  32 - 36 %       [facility] 



                                         indicates this result to be   



                                         consistent with previous   



                                         results.   

 

    



                 RDW             19.4 (H)        9.0 - 14.5 %    SL[facility] 

 

    



                 Platelet Count  169             140 - 400 Th/uL  [facility] 

 

    



                 MPV             12.1            9.4 - 12.3 fL   [facility] 

 

    



                 Nucleated RBCs  0               0 - 0 /100 WBC  [facility] 











                                         Specimen

 





                                         Blood - Venous







   



                 Performing Organization  Address         City/[facility]/ZIP Code  P

kathleen Number

 

   



                     SLRL                [address] [address]

11 





* Magnesium (2021 11:50 PM CST)



Only the most recent of 3 results within the time period is included.



    



              Component    Value        Ref Range    Performed At  Pathologist



                                         Signature

 

    



                 Magnesium       2.50            1.60 - 2.60 mg/dL  [facility] 











                                         Specimen

 





                                         Blood - Venous







   



                 Performing Organization  Address         City/State/ZIP Code  P

kathleen Number

 

   



                     SL[facility]                [address] [address]

11 





* Renal Panel (2021 11:50 PM CST)



    



              Component    Value        Ref Range    Performed At  Pathologist



                                         Signature

 

    



                 Sodium          153 (H)         136 - 145 mEq/L  [facility] 

 

    



                 Potassium       4.4             3.4 - 5.1 mEq/L  [facility] 

 

    



                 Chloride        120 (H)         98 - 107 mEq/L  [facility] 

 

    



                 Carbon Dioxide  29              20 - 31 mEq/L   [facility] 

 

    



                 Anion Gap       4 (L)           5 - 17 mmol/L   [facility] 

 

    



                 Anion Gap       4               mmol/L          [facility] 

 

    



                 Calcium         8.3             8.3 - 10.6 mg/dL  [facility] 

 

    



                 Glucose         173 (H)         70 - 100 mg/dL  [facility] 

 

    



                 Albumin         3.2 (L)         3.5 - 5.0 g/dL  [facility] 

 

    



                 Blood Urea      46 (H)          9 - 23 mg/dL    [facility] 



                                         Nitrogen    

 

    



                 Creatinine      1.20            0.70 - 1.30 mg/dL  [facility] 

 

    



                 eGFR Female AA  53.4 (L)        60.0 - 200.0    [facility] 



                                         mL/min/1.73m*2  

 

    



                 eGFR Female     44.0 (L)        60.0 - 200.0    [facility] 



                           Non-AA                    mL/min/1.73m*2  

 

    



                 eGFR Male AA    71.9            60.0 - 200.0    [facility] 



                                         mL/min/1.73m*2  

 

    



                 eGFR Male       59.3 (L)        60.0 - 200.0    [facility] 



                           Non-AA                    mL/min/1.73m*2  

 

    



                 Phosphorus      3.3             2.4 - 5.1 mg/dL  [facility] 











                                         Specimen

 





                                         Blood - Venous







   



                 Performing Organization  Address         City/State/ZIP Code  P

[facility] Number

 

   



                     RL                [address], 

11 





* XR Skull < 4 views (2021  3:00 PM CST)



Only the most recent of 2 results within the time period is included.



                                        Modality



                           Anatomical Region         Laterality 

 

                                        Computed Radiography



                                         Head  











                                         Specimen

 









                                        Impressions

 

                                        



[facility] - 2021  4:02 PM CST



Findings/impression:

 

Portable AP and lateral skull radiographs were performed.

 

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

not well profiled. Cannot determine the type of shunt reservoir based on

these images. Recommend discussion with [facility]PO[facility] regarding where shunt was

originally placed and attempt at obtaining prior records.

 

Partially imaged nasogastric tube. No significant fluid within the

paranasal sinuses. Incompletely characterized cervical spondylosis.

 

ATTESTATION STATEMENT: Staff radiologist has personally reviewed the

images and dictated, reviewed and/or edited the final report.

 

[address] 2021  4:02 PM CST



 

Patient:  CATRACHITO ANGEL  

 

Sex#: M               

#: 1950       Winston#: [id number]

Location: [address] ICU [address]01  Accession#: [id number]

 

Ordering Provider:  ANIKA SENA 

Procedure Requested: [id number] XR SKULL < 4 VIEWS

Reason for Exam: Profile shunt. Call Radiology when images are taken.

Exam Ordered:    2021 1414

Begin exam date/time: 2021 1455

Exam Date/Time:   2021 1500

 

 

 XR SKULL < 4 VIEWS

 

INDICATION: Profile shunt. 

 

COMPARISON: Skull radiographs 2021, CT head 2021.

 







                                        Procedure Note

 

                                        



Nickolas Mccormick DO - 2021



Formatting of this note might be different from the original.

 

Patient:   CATRACHITO ANGEL   



Sex#: M                             

#: [id number]             Winston#: [id number]

Location: [facility] N3S ICU N306-01   Accession#: [id number]

 

Ordering Provider:   ANIKA SENA 

Procedure Requested:  [id number] XR SKULL < 4 VIEWS

Reason for Exam:  Profile shunt. Call Radiology when images are taken.

Exam Ordered:        2021  1414

Begin exam date/time:  2021  1455

Exam Date/Time:      2021  1500

 

 

 XR SKULL < 4 VIEWS



INDICATION:  Profile shunt. 



COMPARISON: Skull radiographs 2021, CT head 2021.



IMPRESSION

Findings/impression:



Portable AP and lateral skull radiographs were performed.



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

not well profiled. Cannot determine the type of shunt reservoir based on

these images. Recommend discussion with [facility]PO[facility] regarding where shunt was

originally placed and attempt at obtaining prior records.



Partially imaged nasogastric tube. No significant fluid within the

paranasal sinuses. Incompletely characterized cervical spondylosis.



ATTESTATION STATEMENT: Staff radiologist has personally reviewed the

images and dictated, reviewed and/or edited the final report.



READING SITE: [facility] 5

 







   



                 Performing Organization  Address         City/[facility]/ZIP Code  P

kathleen Number

 

   



                                         [facility]   





* Vitamin D, 25-Hydroxy (2021 12:38 PM CST)



    



              Component    Value        Ref Range    Performed At  Pathologist



                                         Signature

 

    



                 Vitamin D       50              30 - 100 ng/mL  SL[facility] 



                                         25-Hydroxy    











                                         Specimen

 





                                         Blood - Venous







                                        Narrative

 

                                        



RL - 2021  1:08 PM CST



Vitamin D Ref Range =>21 Years

Deficiency: <20 ng/mL

Insufficiency: 20 - <30 ng/mL

Sufficiency: 30 - 100 ng/mL

Toxicity Possible: >100 ng/mL







   



                 Performing Organization  Address         City/[facility]/ZIP Code  P

kathleen Number

 

   



                     SL[facility]                [address] [address]

11 





* Thyroid Stimulating Hormone (2021 12:38 PM CST)



    



              Component    Value        Ref Range    Performed At  Pathologist



                                         Signature

 

    



                 Thyroid         5.53 (H)        0.55 - 4.78 uIU/mL  [facility] 



                                         Stimulating    



                                         Hormone    











                                         Specimen

 





                                         Blood - Venous







                                        Narrative

 

                                        



RL - 2021  1:08 PM CST



Reference range updated 10/24/21 with [facility] conversion to [employer] 
instrumentation.







   



                 Performing Organization  Address         City/[facility]/ZIP Code  P

kathleen Number

 

   



                     [facility]                [address] [address]

11 





* B12/Folate (2021 10:35 AM CST)



    



              Component    Value        Ref Range    Performed At  Pathologist



                                         Signature

 

    



                 Vitamin B12     1,218 (H)       211 - 911 pg/mL  [facility] 

 

    



                 Folate          18.46           5.38 - 24.00 ng/mL  [facility] 











                                         Specimen

 





                                         Blood - Venous







   



                 Performing Organization  Address         City/State/ZIP Code  P

kathleen Number

 

   



                     SL[facility]                [address] [address]

11 





* Bilirubin Direct (2021 10:35 AM CST)



    



              Component    Value        Ref Range    Performed At  Pathologist



                                         Signature

 

    



                 Bilirubin       0.20            0.00 - 0.30 mg/dL  [facility] 



                                         Direct    











                                         Specimen

 





                                         Blood - Venous







   



                 Performing Organization  Address         City/[facility]/ZIP Code  P

kathleen Number

 

   



                     [facility]                [address] [address]

11 





* Creatine Kinase (2021 10:35 AM CST)



Only the most recent of 3 results within the time period is included.



    



              Component    Value        Ref Range    Performed At  Pathologist



                                         Signature

 

    



                 Creatine Kinase  151             46 - 171 U/L    [facility] 











                                         Specimen

 





                                         Blood - Venous







                                        Narrative

 

                                        



[facility] - 2021 11:03 AM CST



Reference range updated 10/24/21 with [facility] conversion to [employer] 
instrumentation.







   



                 Performing Organization  Address         City/[facility] Code  P

[facility] Number

 

   



                     SL[facility]                [address] [address]

11 





* US Venous Duplex Upper Extremity bilat (2021  9:37 AM CST)



                                        Modality



                           Anatomical Region         Laterality 

 

                                        Ultrasound



                                         Arm  











                                         Specimen

 









                                        Impressions

 

                                        



[facility] - 2021 10:37 AM CST



 

Right upper extremity superficial thrombus, involving the cephalic vein

in the mid and distal upper arm and antecubital fossa, and extending

into the antecubital vein.

 

No evidence of DVT in the upper extremities. The right ulnar vein could

not be visualized or assessed on this exam.

 

 

Findings were discussed with LISETH Mcfarlane for this patient, by the

sonographer at the completion of this exam.

 

READING SITE: [facility]







                                        Narrative

 

                                        



[facility]



 

Patient:  CATRACHITO ANGEL  

 

Sex#: ADOLFO               

#: 1950       [place]#: [id number]

Location: [address] ICU [address]01  Accession#: [id number]

 

Ordering Provider:  STARLA SYLVESTER 

Procedure Requested: [id number] US VENOUS DUPLEX UPPER EXTREMITY BILAT

Reason for Exam: BUE edema, immobility

Exam Ordered:    2021 0825

Begin exam date/time: 2021 09

Exam Date/Time:   2021 09

 

 

US VENOUS DUPLEX UPPER EXTREMITY BILAT 

 

INDICATION: BUE edema, immobility 

 

COMPARISON: None available 

 

TECHNIQUE: Grayscale, color and spectral Doppler evaluation of the

bilateral upper extremity veins.

 

FINDINGS: 

 

Right upper extremity: There is normal compressibility and color flow in

the internal jugular and axillary veins. Maintained color flow in the

subclavian vein. Distally in the upper extremity the brachial and radial

veins appear patent. The ulnar veins could not be visualized, due to

difficulty with patient cooperation and movement.

 

There is superficial thrombus in the right cephalic vein, involving the

mid and distal upper arm, and extending into the antecubital fossa.

Additional superficial thrombus in the right antecubital vein.

 

Left upper extremity: There is normal compressibility and color flow in

the internal jugular and axillary veins. Maintained color flow in the

subclavian vein. Distally in the upper extremity the visualized segments

of the brachial, ulnar and radial veins show normal compressibility and

flow. 

 

The superficial basilic and cephalic veins also appear patent with

normal compressibility and flow. Portions of the left upper extremity

venous system cannot be visualized due to overlying IV site.

 

 







                                        Procedure Note

 

                                        



GOMEZ Larsen MD - 2021



Formatting of this note might be different from the original.

 

Patient:   CATRACHITO ANGEL   



Sex#: M                             

#: [id number]             Winston#: [id number]

Location: [place] [address]   Accession#: [id number]

 

Ordering Provider:   STARLA SYLVESTER 

Procedure Requested:  [id number] US VENOUS DUPLEX UPPER EXTREMITY BILAT

Reason for Exam:  BUE edema, immobility

Exam Ordered:        2021  0825

Begin exam date/time:  202136

Exam Date/Time:      2021

 

 

US VENOUS DUPLEX UPPER EXTREMITY BILAT 



INDICATION: BUE edema, immobility 



COMPARISON: None available 



TECHNIQUE: Grayscale, color and spectral Doppler evaluation of the

bilateral upper extremity veins.



FINDINGS:  



Right upper extremity: There is normal compressibility and color flow in

the internal jugular and axillary veins. Maintained color flow in the

subclavian vein. Distally in the upper extremity the brachial and radial

veins appear patent. The ulnar veins could not be visualized, due to

difficulty with patient cooperation and movement.



There is superficial thrombus in the right cephalic vein, involving the

mid and distal upper arm, and extending into the antecubital fossa.

Additional superficial thrombus in the right antecubital vein.



Left upper extremity: There is normal compressibility and color flow in

the internal jugular and axillary veins. Maintained color flow in the

subclavian vein. Distally in the upper extremity the visualized segments

of the brachial, ulnar and radial veins show normal compressibility and

flow. 



The superficial basilic and cephalic veins also appear patent with

normal compressibility and flow. Portions of the left upper extremity

venous system cannot be visualized due to overlying IV site.





IMPRESSION



Right upper extremity superficial thrombus, involving the cephalic vein

in the mid and distal upper arm and antecubital fossa, and extending

into the antecubital vein.



No evidence of DVT in the upper extremities. The right ulnar vein could

not be visualized or assessed on this exam.





Findings were discussed with LISETH Mcfarlane for this patient, by the

sonographer at the completion of this exam.



READING SITE: [facility]







   



                 Performing Organization  Address         City/[facility]/ZIP Code  P

[facility] Number

 

   



                                         MCKESSON   





* Rapid Plasma Reagin (2021  8:03 AM CST)



    



              Component    Value        Ref Range    Performed At  Pathologist



                                         Signature

 

    



                 RPR             Nonreactive     Nonreactive     [facility] 











                                         Specimen

 





                                         Blood - Venous







   



                 Performing Organization  Address         City/[facility]/ZIP Code  P

[facility] Number

 

   



                     [facility]                [address] [address]

11 





* Hemoglobin and Hematocrit (2021  6:27 AM CST)



Only the most recent of 3 results within the time period is included.



    



              Component    Value        Ref Range    Performed At  Pathologist



                                         Signature

 

    



                 Hemoglobin      8.0 (L)         13.0 - 17.0 g/dL  [facility] 

 

    



                 Hematocrit      28 (L)          40 - 50 %       [facility] 











                                         Specimen

 





                                         Blood - Central







   



                 Performing Organization  Address         City/[facility]/ZIP Code  P

[facility] Number

 

   



                     [facility]                [address] [address]

11 





* 1 Units (2021  6:10 AM CST)



Only the most recent of 2 results within the time period is included.



    



              Component    Value        Ref Range    Performed At  Pathologist



                                         Signature

 

    



                     01 - CROSS          Compatible          [facility] 



                                         [facility] 

 

    



                      - BLOOD TYPE     A Pos               [facility] 



                                         [facility] 

 

    



                      - UNIT           [id number]       [facility] 



                                         [facility] 

 

    



                     01 - STATUS         Transfused          [facility] 



                                         [facility] 

 

    



                     01 - PRODUCT ID     Red Blood Cells     [facility] 



                                         [facility] 

 

    



                     01 - PRODUCT        [id number]            [facility] 



                                         [facility] 











                                         Specimen

 





                                         Other - Blood







   



                 Performing Organization  Address         City/State/ZIP Code  P

[facility] Number

 

   



                     [facility]  [address] [place] 

[address] 



                                         BLOOD BANK   





* Comprehensive Metabolic Panel (2021 12:29 AM CST)



Only the most recent of 3 results within the time period is included.



    



              Component    Value        Ref Range    Performed At  Pathologist



                                         Signature

 

    



                 Sodium          152 (H)         136 - 145 mEq/L  [facility] 

 

    



                 Potassium       4.3             3.4 - 5.1 mEq/L  [facility] 

 

    



                 Chloride        119 (H)         98 - 107 mEq/L  [facility] 

 

    



                 Carbon Dioxide  28              20 - 31 mEq/L   [facility] 

 

    



                 Anion Gap       5               5 - 17 mmol/L   [facility] 

 

    



                 Anion Gap       5               mmol/L          [facility] 

 

    



                 Calcium         8.0 (L)         8.3 - 10.6 mg/dL  [facility] 

 

    



                 Glucose         151 (H)         70 - 100 mg/dL  [facility] 

 

    



                 Protein Total   5.2 (L)         5.7 - 8.2 g/dL  [facility] 



                                         Serum    

 

    



                 Albumin         3.2 (L)         3.5 - 5.0 g/dL  [facility] 

 

    



                 Alkaline        50Comment: Reference range  46 - 116 U/L    [facility]

L 



                           Phosphatase               updated 10/24/21 with [facility] 

  



                                         conversion to [employer]   



                                         instrumentation.   

 

    



                 Alanine         114 (H)         0 - 49 U/L      [facility] 



                                         Aminotransferas    



                                         e    

 

    



                 Aspartate       98 (H)Comment: Reference range  0 - 34 U/L     

 [facility] 



                           Aminotransferas           updated 10/24/21 with [facility] 

  



                           e                         conversion to [employer]

ica   



                                         instrumentation.   

 

    



                 Bilirubin Total  0.40            0.20 - 1.10 mg/dL  [facility] 

 

    



                 Blood Urea      46 (H)          9 - 23 mg/dL    [facility] 



                                         Nitrogen    

 

    



                 Creatinine      1.20            0.70 - 1.30 mg/dL  [facility] 

 

    



                 eGFR Female AA  53.4 (L)        60.0 - 200.0    [facility] 



                                         mL/min/1.73m*2  

 

    



                 eGFR Female     44.0 (L)        60.0 - 200.0    [facility] 



                           Non-AA                    mL/min/1.73m*2  

 

    



                 eGFR Male AA    71.9            60.0 - 200.0    [facility] 



                                         mL/min/1.73m*2  

 

    



                 eGFR Male       59.3 (L)        60.0 - 200.0    [facility] 



                           Non-AA                    mL/min/1.73m*2  











                                         Specimen

 





                                         Blood - Central







   



                 Performing Organization  Address         City/[facility] Code  P

[facility] Number

 

   



                     [facility]                [address] [place] [address]

11 





* Prothrombin Time/INR (2021 12:29 AM CST)



Only the most recent of 2 results within the time period is included.



    



              Component    Value        Ref Range    Performed At  Pathologist



                                         Signature

 

    



                 Protime         16.0 (H)        11.4 - 15.0 Sec  [facility] 

 

    



                 INR             1.3 (H)         0.8 - 1.2       [facility] 











                                         Specimen

 





                                         Blood - Central







   



                 Performing Organization  Address         City/[facility] Code  P

[facility] Number

 

   



                     [facility]                [address] [place] [address]

11 





* Strep Pneumoniae Urine Antigen (2021  9:47 AM CST)



    



              Component    Value        Ref Range    Performed At  Pathologist



                                         Signature

 

    



                 Streptococcus   Negative        Negative        [facility] 



                                         pneumoniae    



                                         Urine Antigen    











                                         Specimen

 





                                         Urine - Urine







   



                 Performing Organization  Address         City/[facility]/ZIP Code  P

[facility] Number

 

   



                     RL                [address] [place] [address]

11 





* 2 Units (2021  6:10 AM CST)



    



              Component    Value        Ref Range    Performed At  Pathologist



                                         Signature

 

    



                     01 - BLOOD TYPE     A Pos               [facility] 



                                         [facility] 

 

    



                     01 - UNIT           [id number]       [facility] 



                                         [facility] 

 

    



                     01 - STATUS         Transfused          [facility] 



                                         [facility] 

 

    



                     01 - PRODUCT ID     FFP                 [facility] 



                                         [facility] 

 

    



                     01 - PRODUCT        [id number]            [facility] 



                                         [facility] 

 

    



                     01 - BLOOD TYPE     A Neg               [facility] 



                                         [facility] 

 

    



                     01 - UNIT           [id number]       [facility] 



                                         [facility] 

 

    



                     01 - STATUS         Transfused          [facility] 



                                         [facility] 

 

    



                     01 - PRODUCT ID     FFP                 [facility] 



                                         [facility] 

 

    



                     01 - PRODUCT        [id number]            [facility] 



                                         [facility] 











                                         Specimen

 





                                         Blood - Blood







   



                 Performing Organization  Address         City/[facility]/ZIP Code  P

[facility] Number

 

   



                     [facility]  [address] [place] [address] 



                                         BLOOD BANK   





* Lactate Venous WB (2021  9:43 PM CST)



Only the most recent of 3 results within the time period is included.



    



              Component    Value        Ref Range    Performed At  Pathologist



                                         Signature

 

    



                 Lactate Venous  1.5             0.0 - 2.0 mmol/L  [facility] 











                                         Specimen

 





                                         Blood - Venous







   



                 Performing Organization  Address         City/State/ZIP Code  P

[facility] Number

 

   



                     [facility]                [address] [place], [place] [address]

11 





* Phosphorus (2021  9:43 PM CST)



    



              Component    Value        Ref Range    Performed At  Pathologist



                                         Signature

 

    



                 Phosphorus      4.1             2.4 - 5.1 mg/dL  [facility] 











                                         Specimen

 





                                         Blood - Venous







   



                 Performing Organization  Address         City/State/ZIP Code  P

[facility] Number

 

   



                     [facility]                [address] [place], [place] [address]

11 





* CT Lumbar Spine reconstructed (2021  5:46 PM CST)



                                        Modality



                           Anatomical Region         Laterality 

 

                                        Computed Tomography



                                         L-spine  











                                         Specimen

 









                                        Impressions

 

                                        



[facility] - 2021  6:24 PM CST



 

                                        1. No acute osseous abnormality of the

 lumbar spine.

                                        2. Mild to moderate multilevel lumbar 

spondylosis.

 

READING SITE: [facility].

 

ATTESTATION STATEMENT:

The Staff Radiologist has personally reviewed the images and dictated,

reviewed, or edited the final report.







                                        Narrative

 

                                        



[facility] - 2021  6:24 PM CST



 

Patient:  CATRACHITO ANGEL  

 

Sex#: M               

#: 1950       Win[place]#: [id number]

Location: [address] [address]  Accession#: [id number]

 

Ordering Provider:  THOMAS DAS 

Procedure Requested: [id number] CT LUMBAR SPINE RECONSTRUCTED

Reason for Exam: trauma

Exam Ordered:    2021 1256

Begin exam date/time: 2021 1732

Exam Date/Time:   2021 174

 

 

CT LUMBAR SPINE RECONSTRUCTED 

 

Date: 2021 5:47 PM 

 

Indication: trauma confusion. Initial encounter

 

Comparison: None. 

 

Technique: Helical CT images of the lumbar spine were obtained without

contrast. Coronal and sagittal reformatted images were also performed.

One or more of the following dose reduction techniques were utilized:

Automated exposure control (AEC), Adjustment of mA and/or kV according

to patient size, Use of iterative reconstruction technique such as ASiR,

CT scan done according to ALARA and image gently/image wisely.

 

Findings: 

 

Trace retrolisthesis of L1 on L2 and L5 on S1.. No acute fracture.

Vertebral body heights are maintained without compression deformity.

Moderate multilevel disc desiccation and disc space height loss, most

severe at L1-L2. No aggressive lytic or blastic osseous lesion.

 

Mild multilevel spinal canal stenosis secondary to disc bulges. Mild

multilevel neural foraminal narrowing. No high grade spinal canal

stenosis or neuroforaminal narrowing.

 

Trace amount of gas within the lumbar spinal canal and adjacent soft

tissues likely relates to same day lumbar puncture.

 

Please see concurrent, separately dictated CT abdomen/pelvis report for

further details. 

 







                                        Procedure Note

 

                                        



Orville Stark MD - 2021



Formatting of this note might be different from the original.

 

Patient:   CATRACHITO ANGEL   



Sex#: M                             

#: 1950             Winston#: [id number]

Location: [address] ICU [address]   Accession#: [id number]

 

Ordering Provider:   THOMAS DAS 

Procedure Requested:  [id number] CT LUMBAR SPINE RECONSTRUCTED

Reason for Exam:  trauma

Exam Ordered:        2021  1256

Begin exam date/time:  2021  1732

Exam Date/Time:      2021  1746

 

 

CT LUMBAR SPINE RECONSTRUCTED 



Date: 2021 5:47 PM 



Indication:  trauma confusion. Initial encounter



Comparison:  None. 



Technique:  Helical CT images of the lumbar spine were obtained without

contrast. Coronal and sagittal reformatted images were also performed.

One or more of the following dose reduction techniques were utilized:

Automated exposure control (AEC), Adjustment of mA and/or kV according

to patient size, Use of iterative reconstruction technique such as ASiR,

CT scan done according to ALARA and image gently/image wisely.



Findings: 



Trace retrolisthesis of L1 on L2 and L5 on S1.. No acute fracture.

Vertebral body heights are maintained without compression deformity.

Moderate multilevel disc desiccation and disc space height loss, most

severe at L1-L2. No aggressive lytic or blastic osseous lesion.



Mild multilevel spinal canal stenosis secondary to disc bulges. Mild

multilevel neural foraminal narrowing. No high grade spinal canal

stenosis or neuroforaminal narrowing.



Trace amount of gas within the lumbar spinal canal and adjacent soft

tissues likely relates to same day lumbar puncture.



Please see concurrent, separately dictated CT abdomen/pelvis report for

further details.  



IMPRESSION



                                        1.  No acute osseous abnormality of the 

lumbar spine.

                                        2.  Mild to moderate multilevel lumbar s

pondylosis.



READING SITE: [facility].



ATTESTATION STATEMENT:

The Staff Radiologist has personally reviewed the images and dictated,

reviewed, or edited the final report.







   



                 Performing Organization  Address         City/State/ZIP Code  P

[facility] Number

 

   



                                         [facility]   





* CT Abdomen Pelvis wo contrast (2021  5:46 PM CST)



                                        Modality



                           Anatomical Region         Laterality 

 

                                        Computed Tomography



                                         Abdomen, Pelvis  











                                         Specimen

 









                                        Impressions

 

                                        



[facility] - 2021  7:35 AM CST



 

 

                                        1. No evidence of acute traumatic or sol

id organ injury within the

abdomen.

                                        2. Hyperdense material within mildly dis

tended gallbladder may relate to

gallbladder sludge or cholelithiasis. Right upper quadrant ultrasound

can be obtained for further evaluation, as clinically indicated.

                                        3. Hepatic cirrhosis. Small volume abdom

inal pelvic ascites.

                                        4. Moderate colonic stool.

 

ATTESTATION STATEMENT:

The Staff Radiologist has personally reviewed the images and dictated,

reviewed, or edited the final report.

 

READING SITE: [facility] - 2021  7:35 AM CST



 

Patient:  CATRACHITO ANGEL  

 

Sex#: M               

#: 1950       Winston#: [id number]

Location: [address] ICU [facility]-01  Accession#: [id number]

 

Ordering Provider:  THOMAS DAS 

Procedure Requested: [id number] CT ABDOMEN PELVIS WO CONTRAST

Reason for Exam: trauma

Exam Ordered:    2021 1255

Begin exam date/time: 2021 1732

Exam Date/Time:   2021 1746

 

 

CT ABDOMEN PELVIS WO CONTRAST

 

INDICATION: Trauma

 

TECHNIQUE: CT of the abdomen and pelvis without contrast. Coronal and

sagittal reformatted images were performed.

 

COMPARISON: None.

 

FINDINGS: Trace abdominopelvic simple ascites. No free air or fluid

collections. Ventriculoperitoneal shunt catheter tubing courses through

the right hemiabdomen.

 

Lower chest: Please see same day chest CT report for additional

information.

 

ABDOMEN:

Liver: Cirrhotic morphology of the liver.

 

Gallbladder and biliary: Hyperdense material within a distended

gallbladder likely relates to small amount of sludge.

 

Spleen: Normal size spleen.

 

Pancreas: The pancreas is normal in attenuation without peripancreatic

inflammatory changes. No pancreatic duct dilation.

 

Adrenal glands: Normal size adrenal glands.

 

Kidneys and ureters: Normal size kidneys and ureters. No renal stones.

 

GI tract, Mesentery: Gastric tube curled within the proximal aspect of

the stomach. The stomach is decompressed.. Normal caliber small bowel.

Normal caliber colon. Moderate colonic stool. Normal appendix.

 

Vascular structures: Normal caliber abdominal aorta. Extensive calcified

atherosclerosis.

 

Retroperitoneum, Lymph nodes: No lymphadenopathy in the abdomen or

pelvis.

 

PELVIS:

Genitourinary system: Bladder is decompressed with a Castellano catheter.

Normal size prostate gland and seminal vesicles.

 

SKELETAL STRUCTURES AND SOFT TISSUES: No fracture or destructive lesions

in the visualized skeleton. Multilevel lumbar spondylosis. Trace amount

of gas within the lumbar spinal canal and adjacent lumbar spine soft

tissues likely relates to same day attempted lumbar puncture. Anasarca.

 







                                        Procedure Note

 

                                        



Brian Buchanan MD - 2021



Formatting of this note might be different from the original.

 

Patient:   CATRACHITO ANGEL   



Sex#: M                             

#: 1950             Win[place]#: [id number]

Location: [place] [address]   Accession#: [id number]

 

Ordering Provider:   THOMAS DAS 

Procedure Requested:  [id number] CT ABDOMEN PELVIS WO CONTRAST

Reason for Exam:  trauma

Exam Ordered:        2021  1255

Begin exam date/time:  2021  1732

Exam Date/Time:      2021  1746

 

 

CT ABDOMEN PELVIS WO CONTRAST



INDICATION: Trauma



TECHNIQUE: CT of the abdomen and pelvis without contrast. Coronal and

sagittal reformatted images were performed.



COMPARISON: None.



FINDINGS: Trace abdominopelvic simple ascites. No free air or fluid

collections. Ventriculoperitoneal shunt catheter tubing courses through

the right hemiabdomen.



Lower chest: Please see same day chest CT report for additional

information.



ABDOMEN:

Liver: Cirrhotic morphology of the liver.



Gallbladder and biliary: Hyperdense material within a distended

gallbladder likely relates to small amount of sludge.



Spleen: Normal size spleen.



Pancreas: The pancreas is normal in attenuation without  peripancreatic

inflammatory changes. No pancreatic duct dilation.



Adrenal glands: Normal size adrenal glands.



Kidneys and ureters: Normal size kidneys and ureters. No renal stones.



GI tract, Mesentery: Gastric tube curled within the proximal aspect of

the stomach. The stomach is decompressed.. Normal caliber small bowel.

Normal caliber colon. Moderate colonic stool. Normal appendix.



Vascular structures: Normal caliber abdominal aorta. Extensive calcified

atherosclerosis.



Retroperitoneum, Lymph nodes: No lymphadenopathy in the abdomen or

pelvis.



PELVIS:

Genitourinary system: Bladder is decompressed with a Castellano catheter.

Normal size prostate gland and seminal vesicles.



SKELETAL STRUCTURES AND SOFT TISSUES: No fracture or destructive lesions

in the visualized skeleton. Multilevel lumbar spondylosis. Trace amount

of gas within the lumbar spinal canal and adjacent lumbar spine soft

tissues likely relates to same day attempted lumbar puncture. Anasarca.



IMPRESSION





                                        1. No evidence of acute traumatic or sol

id organ injury within the

abdomen.

                                        2. Hyperdense material within mildly dis

tended gallbladder may relate to

gallbladder sludge or cholelithiasis. Right upper quadrant ultrasound

can be obtained for further evaluation, as clinically indicated.

                                        3. Hepatic cirrhosis. Small volume abdom

inal pelvic ascites.

                                        4. Moderate colonic stool.



ATTESTATION STATEMENT:

The Staff Radiologist has personally reviewed the images and dictated,

reviewed, or edited the final report.



READING SITE: [facility] Performing Organization  Address         City/State/ZIP Code  P

kathleen Number

 

   



                                         [place]   





* CT Cervical Spine wo contrast (2021  5:44 PM CST)



                                        Modality



                           Anatomical Region         Laterality 

 

                                        Computed Tomography



                                         C-spine  











                                         Specimen

 









                                        Impressions

 

                                        



[facility] - 2021  6:24 PM CST



1. No acute fracture. Slight anterolisthesis of C2 on C3 with mild

widening of the C2-C3 disc space may relate to patient positioning,

however ligamentous injury would be difficult to exclude given patient

history of trauma. MRI cervical spine is recommended for further

evaluation if clinically indicated.

                                        2. Moderate degenerative cervical spondy

losis.

 

ATTESTATION STATEMENT: The Staff Radiologist has personally reviewed the

images and dictated, reviewed, or edited the final report.

 

READING SITE: [facility]







                                        Narrative

 

                                        



[facility] - 2021  6:24 PM CST



 

Patient:  CATRACHITO ANGEL  

 

Sex#: M               

#: 1950       [place]#: [id number]

Location: [address] ICU [address]306[address]01  Accession#: [id number]

 

Ordering Provider:  THOMAS DAS 

Procedure Requested: [id number] CT CERVICAL SPINE WO CONTRAST

Reason for Exam: trauma

Exam Ordered:    2021 1255

Begin exam date/time: 2021 1732

Exam Date/Time:   2021 174

 

 

CT CERVICAL SPINE WO CONTRAST 

 

DATE: 2021 5:45 PM

 

INDICATION: trauma neck pain. Initial encounter

 

TECHNIQUE: Noncontrast CT of the cervical spine was performed. Sagittal

and coronal reformats were performed and evaluated. One or more of the

following dose reduction techniques were utilized: Automated exposure

control (AEC), Adjustment of mA and/or kV according to patient size, Use

of iterative reconstruction technique such as ASiR, CT scan done

according to ALARA and image gently/image wisely

 

COMPARISON: None. 

 

FINDINGS:

 

                                        2 mm anterolisthesis of C2 on C3 with mi

ld widening of the C2-C3 disc

space (series 605, image 22). No acute fracture. No aggressive lytic or

blastic osseous lesions.

 

Moderate multilevel degenerative disc space height loss. Multilevel mild

and moderate spinal canal stenosis secondary to disc protrusions and

marginal osteophytes. Multilevel moderate neuroforaminal narrowing

secondary to uncovertebral arthrosis. Multilevel mild facet arthrosis. 

 

The thyroid gland is atrophic. No cervical lymphadenopathy. Bilateral

carotid atherosclerosis. Gastric tube partially imaged in the esophagus.

 

 

Please see concurrent, separately dictated Chest CT report for further

details. 

 







                                        Procedure Note

 

                                        



Orville Stark MD - 2021



Formatting of this note might be different from the original.

 

Patient:   CATRACHITO ANGEL   



Sex#: M                             

#: 1950             [place]#: [id number]

Location: [address]   Accession#: [id number]

 

Ordering Provider:   THOMAS DAS 

Procedure Requested:  [id number] CT CERVICAL SPINE WO CONTRAST

Reason for Exam:  trauma

Exam Ordered:        2021  1255

Begin exam date/time:  2021  1732

Exam Date/Time:      2021  1744

 

 

CT CERVICAL SPINE WO CONTRAST 



DATE: 2021 5:45 PM



INDICATION: trauma neck pain. Initial encounter



TECHNIQUE: Noncontrast CT of the cervical spine was performed. Sagittal

and coronal reformats were performed and evaluated. One or more of the

following dose reduction techniques were utilized:  Automated exposure

control (AEC), Adjustment of mA and/or kV according to patient size, Use

of iterative reconstruction technique such as ASiR, CT scan done

according to ALARA and image gently/image wisely



COMPARISON: None. 



FINDINGS:



                                        2 mm anterolisthesis of C2 on C3 with mi

ld widening of the C2-C3 disc

space (series 605, image 22). No acute fracture. No aggressive lytic or

blastic osseous lesions.



Moderate multilevel degenerative disc space height loss. Multilevel mild

and moderate spinal canal stenosis secondary to disc protrusions and

marginal osteophytes. Multilevel moderate neuroforaminal narrowing

secondary to uncovertebral arthrosis. Multilevel mild facet arthrosis.  



The thyroid gland is atrophic. No cervical lymphadenopathy. Bilateral

carotid atherosclerosis. Gastric tube partially imaged in the esophagus.

 



Please see concurrent, separately dictated Chest CT report for further

details.  



IMPRESSION

                                        1. No acute fracture. Slight anterolisth

esis of C2 on C3 with mild

widening of the C2-C3 disc space may relate to patient positioning,

however ligamentous injury would be difficult to exclude given patient

history of trauma. MRI cervical spine is recommended for further

evaluation if clinically indicated.

                                        2. Moderate degenerative cervical spondy

losis.



ATTESTATION STATEMENT: The Staff Radiologist has personally reviewed the

images and dictated, reviewed, or edited the final report.



READING SITE: [facility] Performing Organization  Address         City/State/ZIP Code  P

ak[place]leen Number

 

   



                                         [facility]   





* IR Lumbar puncture w fluoro (2021  5:09 PM CST)



                                        Modality



                           Anatomical Region         Laterality 

 

                                        X-Ray Angiography



                                         L-spine  











                                         Specimen

 









                                        Impressions

 

                                        



[facility] - 2021  2:11 PM CST



 

Unable to collect spinal fluid at multiple levels. The spinal needle tip

was confirmed within the thecal sac on AP and lateral on multiple

levels. 

 

 

ATTESTATION STATEMENT: The Staff Physician has personally reviewed the

images and dictated, reviewed, or edited the final report.

 

READING SITE: [facility]







                                        Narrative

 

                                        



[facility] - 2021  2:11 PM CST



 

Patient:  CATRACHITO ANGEL  

 

Sex#: M               

#: 1950       [place]#: [id number]

Location: [address] ICU [address]01  Accession#: [id number]

 

Ordering Provider:  ROBERTA BAILEY 

Procedure Requested: [id number] IR LUMBAR PUNCTURE W FLUORO

Reason for Exam: encephalopathy of unknown source

Exam Ordered:    2021 1556

Begin exam date/time: 2021 1639

Exam Date/Time:   2021 1709

 

 

IR LUMBAR PUNCTURE W FLUORO 

 

DATE: 2021 7:04 AM 

 

INDICATION: encephalopathy of unknown source. Initial encounter

 

CONSENT: The nature of the procedure as well as its risks, benefits, and

alternatives were discussed with the patient's family by Dr. Cisse. 

Risks specifically discussed included pain, bleeding, infection, spinal

headache, cerebral spinal fluid (CSF) leak, and potential nerve damage. 

The patient's family questions were answered, and both verbal and

written consent was given to proceed.

 

ATTENDING PHYSICIAN: Dr. Stark, the neurointerventional attending

physician, was present for the entire procedure.

 

ASSISTANT: Betito

 

TECHNIQUE: A "time out" procedure was performed verifying the patient's

identity and procedure to be performed. The L2-3, L3-L4, and L4-L5 were

level was localized with fluoroscopy. The skin overlying this level was

then sterilely prepped, draped, and infiltrated with 1% lidocaine for

local anesthesia. Under fluoroscopic guidance, a 20 gauge 3.5 inch

spinal needle was inserted into the thecal sac at those levels. The

needle tip position within the thecal sac was confirmed and AP and

lateral. 

 

FLUORO TIME: 1.1 minutes

 

RADIATION EXPOSURE: Air Kerma (Ka,r) 11.9 mGy

 

FINDINGS:

 

Unable to collect spinal fluid at multiple levels. The spinal needle tip

was confirmed within the thecal sac on AP and lateral on multiple

levels. 

 







                                        Procedure Note

 

                                        



Orville Stark MD - 2021



Formatting of this note might be different from the original.

 

Patient:   CATRACHITO ANGEL   



Sex#: M                             

#: 1950             [place]#: [id number]

Location: [address]   Accession#: [id number]

 

Ordering Provider:   ROBERTA BAILEY 

Procedure Requested:  [id number] IR LUMBAR PUNCTURE W FLUORO

Reason for Exam:  encephalopathy of unknown source

Exam Ordered:        2021  1556

Begin exam date/time:  2021  1639

Exam Date/Time:      2021  1709

 

 

IR LUMBAR PUNCTURE W FLUORO  



DATE:  2021 7:04 AM 



INDICATION: encephalopathy of unknown source. Initial encounter



CONSENT: The nature of the procedure as well as its risks, benefits, and

alternatives were discussed with the patient's family by Dr. Cisse. 

Risks specifically discussed included pain, bleeding, infection, spinal

headache, cerebral spinal fluid (CSF) leak, and potential nerve damage. 

The patient's family questions were answered, and both verbal and

written consent was given to proceed.



ATTENDING PHYSICIAN: Dr. Stark, the neurointerventional attending

physician, was present for the entire procedure.



ASSISTANT: Betito



TECHNIQUE:  A "time out" procedure was performed verifying the patient's

identity and procedure to be performed.  The L2-3, L3-L4, and L4-L5 were

level was localized with fluoroscopy. The skin overlying this level was

then sterilely prepped, draped, and infiltrated with 1% lidocaine for

local anesthesia. Under fluoroscopic guidance, a 20 gauge 3.5 inch

spinal  needle was inserted into the thecal sac at those levels. The

needle tip position within the thecal sac was confirmed and AP and

lateral. 



FLUORO TIME: 1.1 minutes



RADIATION EXPOSURE: Air Kerma (Ka,r) 11.9 mGy



FINDINGS:



Unable to collect spinal fluid at multiple levels. The spinal needle tip

was confirmed within the thecal sac on AP and lateral on multiple

levels. 



IMPRESSION



Unable to collect spinal fluid at multiple levels. The spinal needle tip

was confirmed within the thecal sac on AP and lateral on multiple

levels. 





ATTESTATION STATEMENT: The Staff Physician has personally reviewed the

images and dictated, reviewed, or edited the final report.



READING SITE: [facility]







   



                 Performing Organization  Address         City/State/ZIP Code  P

kathleen Number

 

   



                                         [place]   





* EEG Prolonged (> 60 Min) (2021  2:52 PM CST)



                                        Modality



                           Anatomical Region         Laterality 

 

                                        Other











                                         Specimen

 









                                        Narrative

 

                                        



[facility] NEURO - 2021  4:06 PM CST



Sebastian Sorto MD   2021 4:15 PM

NEURODIAGNOSTICS - EEG Report

 

DATE OF STUDY: 2021

 

TYPE OF EEG: Prolonged

 

LENGTH OF EE min

 

REFERRING PROVIDER: 

GIA Hernandez

 

INTERPRETING PHYSICIAN: 

Sebastian Sorto M.D., M.S.

 

INDICATION: 

Evaluate for seizures.

 

MEDICATIONS:



 cefTRIAXone (ROCEPHIN) injection 2 g 



 dexmedeTOMIDINE (PRECEDEX) 4 mcg/mL infusion 100 mL (premix) 



 dextrose 50% (D50W) syringe 25-50 mL 



 famotidine (PEPCID) tablet 20 mg 

 Or 



 famotidine (PEPCID) injection 20 mg 



 glucagon (GLUCAGEN) injection 1 mg 

 Or 



 glucagon (GLUCAGEN) injection 1 mg 



 glucose chewable tablet 16-32 g 



 hydrALAZINE (APRESOLINE) injection 10-20 mg 



 ipratropium-albuteroL (DUO-NEB) 0.5-3 mg/3 mL nebulizer 

solution 3 mL 



 labetaloL (NORMODYNE,TRANDATE) injection 10-20 mg 



 lidocaine (pf) (XYLOCAINE-MPF) 20 mg/mL (2 %) injection 1-10 mL 

 



 methylPREDNISolone sod suc(PF) (SOLU-Medrol) injection 40 mg 



 ondansetron (ZOFRAN) injection 4 mg 



 prochlorperazine (COMPAZINE) injection 2.5-5 mg 

 Or 



 prochlorperazine (COMPAZINE) injection 2.5-5 mg 

 Or 



 prochlorperazine (COMPAZINE) suppository 25 mg 



 senna-docusate (PERICOLACE) 8.6-50 mg 1 tablet 



 sodium chloride 0.9% (NS) IV Bolus 



 sodium chloride 0.9% (NS) IV Bolus 



 sodium chloride 0.9% infusion 

 

 

 

FINDINGS: 

 

ABNORMALITY #1:

Background rhythm shows generalized delta frequency slowing in a 

                                        1 to 4 Hz frequency range. Intermixed 

faster theta frequency 

components can occur more prominently over the central regions 

that at times can have poorly formed sleep morphologies.

 

BACKGROUND: 

As above.

 

HYPERVENTILATION: 

Not performed.

 

INTERMITTENT PHOTIC STIMULATION: 

Not performed.

 

SLEEP: 

Normal transitions of wakefulness and sleep are not identified.

 

CARDIAC MONITOR: 

Shows a normal sinus rhythm. 

 

IMPRESSION: 

This is an abnormal prolonged EEG due to the presence of 

generalized slowing throughout the recording. This finding 

suggest midline subcortical dysfunction likely corresponding to 

encephalopathy. No clear epileptiform activity is identified.

 

 







   



                 Performing Organization  Address         City/State/ZIP Code  P

kathleen Number

 

   



                                         [facility] NEURO   





* CT Thoracic Spine reconstructed (2021  1:06 PM CST)



                                        Modality



                           Anatomical Region         Laterality 

 

                                        Computed Tomography



                                         T-spine  











                                         Specimen

 









                                        Impressions

 

                                        



[facility] - 2021  5:44 PM CST



Impression:

 

Mild to moderate multilevel degenerative thoracic spondylosis.

 

READING SITE: [facility].

 

ATTESTATION STATEMENT:

The Staff Radiologist has personally reviewed the images and dictated,

reviewed, or edited the final report.

 







                                        Narrative

 

                                        



SABRANATHALIE - 2021  5:44 PM CST



 

Patient:  CATRACHITO ANGEL  

 

Sex#: M               

#: 1950       [place]#: [id number]

Location: [address] ICU [address]  Accession#: [id number]

 

Ordering Provider:  THOMAS DAS 

Procedure Requested: [id number] CT THORACIC SPINE RECONSTRUCTED

Reason for Exam: trauma

Exam Ordered:    2021 1256

Begin exam date/time: 2021 1305

Exam Date/Time:   2021 1306

 

 

CT THORACIC SPINE RECONSTRUCTED 

 

                                        2021 1:07 PM 

 

Indication: Trauma, pt transfer from [facility]. Found on floor today

by neighbor. Dx with SDH and subacute stroke. pt is A&Ox1 initial

encounter

 

Comparison: Concurrent CT chest.

 

Technique: CT imaging of the thoracic spine was reconstructed from

concurrent CT chest. Coronal and sagittal reformatted images were

performed. One or more of the following dose reduction techniques were

utilized: Automated exposure control (AEC), Adjustment of mA and/or kV

according to patient size, Use of iterative reconstruction technique

such as ASiR, CT scan done according to ALARA and image gently/image

wisely.

 

Findings:

Trace anterolisthesis of T9 on T10 and T10 on T11. Trace retrolisthesis

of T6 on T7. The normal thoracic kyphosis is maintained. No acute

fracture. Vertebral body heights are maintained without compression

deformity. Multilevel mild to moderate degenerative disc disease with

marginal osteophytes and multilevel vacuum disc phenomenon. Ligamentum

flavum calcification at T10-T11. No aggressive lytic or blastic osseous

lesion.

 

No significant spinal canal stenosis or neural foraminal narrowing.

 

Please refer to the separately dictated report for findings within the

chest. 

 







                                        Procedure Note

 

                                        



Orville Stark MD - 2021



Formatting of this note might be different from the original.

 

Patient:   CATRACHITO ANGEL   



Sex#: M                             

#: 1950             [place]#: [id number]

Location: [address]   Accession#: [id number]

 

Ordering Provider:   THOMAS DAS 

Procedure Requested:  [id number] CT THORACIC SPINE RECONSTRUCTED

Reason for Exam:  trauma

Exam Ordered:        2021  1256

Begin exam date/time:  2021  1305

Exam Date/Time:      2021  1306

 

 

CT THORACIC SPINE RECONSTRUCTED 



                                        2021 1:07 PM 



Indication:  Trauma, pt transfer from [address] [facility]. Found on floor today

by neighbor. Dx with SDH and subacute stroke. pt is A&Ox1 initial

encounter



Comparison: Concurrent CT chest.



Technique:  CT imaging of the thoracic spine was reconstructed from

concurrent CT chest. Coronal and sagittal reformatted images were

performed. One or more of the following dose reduction techniques were

utilized: Automated exposure control (AEC), Adjustment of mA and/or kV

according to patient size, Use of iterative reconstruction technique

such as ASiR, CT scan done according to ALARA and image gently/image

wisely.



Findings:

Trace anterolisthesis of T9 on T10 and T10 on T11. Trace retrolisthesis

of T6 on T7. The normal thoracic kyphosis is maintained. No acute

fracture. Vertebral body heights are maintained without compression

deformity. Multilevel mild to moderate degenerative disc disease with

marginal osteophytes and multilevel vacuum disc phenomenon. Ligamentum

flavum calcification at T10-T11. No aggressive lytic or blastic osseous

lesion.



No significant spinal canal stenosis or neural foraminal narrowing.



Please refer to the separately dictated report for findings within the

chest. 



IMPRESSION

Impression:



Mild to moderate multilevel degenerative thoracic spondylosis.



READING SITE: [facility].



ATTESTATION STATEMENT:

The Staff Radiologist has personally reviewed the images and dictated,

reviewed, or edited the final report.









   



                 Performing Organization  Address         City/State/ZIP Code  P

kathleen Number

 

   



                                         [facility]   





* Clotting Screen (2021 12:00 PM CST)



Only the most recent of 2 results within the time period is included.



    



              Component    Value        Ref Range    Performed At  Pathologist



                                         Signature

 

    



                 Protime         14.3            11.4 - 15.0 Sec  [facility] 

 

    



                 INR             1.1             0.8 - 1.2       [facility] 

 

    



                 APTT            30              22 - 34 Sec     [facility] 











                                         Specimen

 





                                         Blood - Venous







   



                 Performing Organization  Address         City/[facility] Code  P

kathleen Number

 

   



                     [facility]                [address] [address]

11 





* Troponin-I HS Single (2021 11:38 AM CST)



Only the most recent of 2 results within the time period is included.



    



              Component    Value        Ref Range    Performed At  Pathologist



                                         Signature

 

    



                 Troponin I, 0HR  302 (HH)        <3-53 pg/mL pg/mL  [facility] 



                                         HS    











                                         Specimen

 





                                         Blood - Venous







                                        Narrative

 

                                        



[facility] - 2021 12:47 PM CST



[facility] converted from Troponin I ([facility]) to 
High-Sensitivity Troponin I ([facility]) on 2021.







   



                 Performing Organization  Address         City/[facility]/ZIP Code  P

[facility] Number

 

   



                     [facility]                [address] [address]

11 





* ECHO COMPLETE W DOPPLER AND COLOR FLOW (2021 11:13 AM CST)



    



              Component    Value        Ref Range    Performed At  Pathologist



                                         Signature

 

    



                 Ejection        60              %               [facility] 



                                         Fraction    







                                        Modality



                           Anatomical Region         Laterality 

 

                                        Ultrasound



                                         Chest  











                                         Specimen

 









                                        Impressions

 

                                        



[facility] - 2021 12:39 PM CST



 1. Normal left ventricular systolic function, with an estimated ejection 
fraction of 60%. 

 2. Normal right ventricular size and systolic function. 

 3. No significant valvular abnormalities. 

 No previous study available for comparison. 

 

 Dr Johnny Benavidez

 (Electronically Signed)

 Final Date:  2021 12:38







                                        Narrative

 

                                        



PROS[facility]V - 2021 12:39 PM CST



This result has an attachment that is not available.



 

 

                     ECHOCARDIOGRAM 
REPORT

  [facility]

 

 Name: CATRACHITO ANGEL         Date:   
2021 10:50         Chart #: [id number]

 :  1950              Location: [facility]     Sono:   [email or web address]

 Age:  71     Gender:  M       Referring: GRISELDA BLEDSOE        Room #:  N306

                           
                  Fellow:

 Indication:elevated

 

 

 Procedure: ECHO COMPLETE W DOPPLER AND COLOR FLOW

 BP: 119  / 75   HR: 84     Ht: 66       Wt: 
161     BSA  1.8

                           
           :

 2D ECHO MEASUREMENTS

 LV Diastolic Diameter Bas 4.5 cm           IVS Diastolic 
Thickness  1 cm    

 LV Systolic Diameter Base 2.9 cm           LVPW Diastolic
 Thickness  0.9 cm   

 LA Systolic Diameter LX  3.3 cm           Aorta at 
Sinuses Diameter 3.3 cm   

 

 AORTIC VALVE DOPPLER

 AV Peak Velocity      166 cm/s          LVOT AV Thierry
 Ratio     0.48    

 AV Peak Gradient      11 mmHg   

 

 MITRAL VALVE DOPPLER

 Mitral E Point Velocity  107 cm/s          Mitral E to A 
Ratio    0.81    

 Mitral A Point Velocity  132 cm/s          MV 
Deceleration Time    194 ms   

 

 

 

 

 

 

 

 

 

 

WALL SEGMENT ANALYSIS: ROUTINE

 

 LVSI : 1  %FM : 100

 LAD : 1  LCX : 1  RCA : 1

 

 

 

 

 

 

 FINDINGS                   LV Ejection 
Fraction: 60

 Very technically difficult study.

 Normal left ventricular systolic function, with an estimated ejection 
fraction of 60%. 

 Normal wall thickness. 

 Normal wall motion. 

 Normal left ventricular dimensions. 

 Normal right ventricular size and systolic function. 

 Normal right and left atrial size. 

 Indeterminate diastolic function, at rest. 

 Sclerotic aortic valve without regurgitation. 

 Mitral annular calcification with trivial regurgitation. 

 Pulmonic valve not well visualized.

 Normal tricuspid valve with trivial regurgitation. Unable to accurately 
estimate pulmonary artery pressure. 

 No pericardial effusion. 

 IVC is dilated and not responsive to inspiration indicating markedly elevated
 RA pressure. 

 Ascending aorta not well visualized. 

 No obvious intracardiac masses or thrombi. 







                                        Procedure Note

 

                                        



Johnny Benavidez MD - 2021



Formatting of this note might be different from the original.





                                         ECHOCARDIOGRAM REPORT

   [facility]



  Name:  CATRACHITO ANGEL                  Date:      2021 10:50       
          Chart #:  [id number]

  :   1950                           Location:  [facility]
          Sono:     [email or web address]

  Age:   71         Gender:    M              Referring: GRISELDA BLEDSOE     
          Room #:   N306

                                                                                
          Fellow:

  Indication:elevated





  Procedure: ECHO COMPLETE W DOPPLER AND COLOR FLOW

  BP: 119   / 75      HR:  84          Ht: 66             Wt:  161          BSA 
  1.8

                                                                            :

 2D ECHO MEASUREMENTS

 LV Diastolic Diameter Bas  4.5 cm                     IVS Diastolic Thickness  
  1 cm        

 LV Systolic Diameter Base  2.9 cm                     LVPW Diastolic Thickness 
  0.9 cm      

 LA Systolic Diameter LX    3.3 cm                     Aorta at Sinuses Diameter
  3.3 cm      



 AORTIC VALVE DOPPLER

 AV Peak Velocity           166 cm/s                   LVOT AV Thierry Ratio        
  0.48        

 AV Peak Gradient           11 mmHg     



 MITRAL VALVE DOPPLER

 Mitral E Point Velocity    107 cm/s                   Mitral E to A Ratio      
  0.81        

 Mitral  A Point Velocity   132 cm/s                   MV Deceleration Time     
  194 ms      





















WALL SEGMENT ANALYSIS: ROUTINE



  LVSI : 1    %FM :  100

  LAD  : 1    LCX : 1    RCA : 1













  FINDINGS                                     LV Ejection Fraction: 60

  Very technically difficult study.

  Normal left ventricular systolic function, with an estimated ejection fraction
 of 60%. 

  Normal wall thickness. 

  Normal wall motion. 

  Normal left ventricular dimensions. 

  Normal right ventricular size and systolic function. 

  Normal right and left atrial size. 

  Indeterminate diastolic function, at rest. 

  Sclerotic aortic valve without regurgitation. 

  Mitral annular calcification with trivial regurgitation. 

  Pulmonic valve not well visualized.

  Normal tricuspid valve with trivial regurgitation.  Unable to accurately 
estimate pulmonary artery pressure. 

  No pericardial effusion. 

  IVC is dilated and not responsive to inspiration indicating markedly elevated 
RA pressure. 

  Ascending aorta not well visualized. 

  No obvious intracardiac masses or thrombi. 

IMPRESSION

  1. Normal left ventricular systolic function, with an estimated ejection 
fraction of 60%. 

  2. Normal right ventricular size and systolic function. 

  3. No significant valvular abnormalities. 

  No previous study available for comparison. 



  Dr Johnny Benavidez

  (Electronically Signed)

  Final Date:    2021 12:38







   



                 Performing Organization  Address         City/State/ZIP Code  P

kathleen Number

 

   



                                         [facility]   





* CT Chest wo contrast (2021 10:35 AM CST)



                                        Modality



                           Anatomical Region         Laterality 

 

                                        Computed Tomography



                                         Lung, Chest  











                                         Specimen

 









                                        Impressions

 

                                        



EDWIGE - 2021 11:18 AM CST



1. Diffuse interstitial edema.

                                        2. Posterior relaxation and scattered 

subsegmental atelectasis. No

pulmonary mass or confluent consolidation.

                                        3. Cardiomegaly. Heavy coronary artery

 calcifications. Aortic valve

leaflet calcifications could represent calcific aortic stenosis.

                                        4. Small bilateral pleural effusions w

ith features of partial

loculation on the right. No pneumothorax.

                                        5. Small upper abdominal ascites.

 

READING SITE: [facility] - 2021 11:18 AM CST



 

Patient:  CATRACHITO ANGEL  

 

Sex#: M               

#: 1950       [place]#: [id number]

Location: [address] ICU N306-01  Accession#: [id number]

 

Ordering Provider:  GRISELDA BLEDSOE

Procedure Requested: [id number] CT CHEST WO CONTRAST

Reason for Exam: pneumothorax

Exam Ordered:    2021 1016

Begin exam date/time: 2021 1028

Exam Date/Time:   2021 1035

 

 

CT CHEST WO CONTRAST 

 

INDICATION: pneumothorax.

 

COMPARISON STUDY: Portable chest dated 2021.

 

TECHNIQUE: Unenhanced axial images were obtained through the lungs and

upper abdomen. Coronal MIP images and coronal and sagittal multiplanar

reconstructions were also obtained.

 

FINDINGS: Image quality degraded by motion.

 

Life Support Devices: Gastric tube courses into the stomach. Partially

imaged shunt catheter courses along the anterior chest wall and into the

abdomen.

 

Lungs and Airways: Apical paraseptal emphysema and bullous disease. Mild

to moderate upper lobe predominant centrilobular emphysema. Diffuse

right greater than left interlobular septal thickening. Right greater

than left posterior relaxation atelectasis. Normal central airways.

Diffuse bronchial wall thickening. Peripheral endoluminal plugs.

 

Pleura: Small right greater than left bilateral pleural effusions with

features of partial loculation on the right. No pneumothorax.

 

Heart and Mediastinum: Atrophic thyroid. No axillary or supraclavicular

lymphadenopathy. No mediastinal, hilar or retrocrural lymphadenopathy.

Cardiomegaly. No pericardial effusion. Heavy coronary artery

calcifications. Aortic valve leaflet calcifications. Atherosclerosis of

the thoracic aorta and branch vessels. Gas in the pulmonary trunk is

likely iatrogenic. Small hiatus hernia. Patulous esophagus with mural

thickening.

 

Abdomen: Small upper abdominal ascites. Atherosclerosis of the abdominal

aorta and branch vessels.

 

Bones and Soft Tissues: Thoracic spondylosis. Diffuse body wall edema.

 







                                        Procedure Note

 

                                        



Christi Hernandez MD - 2021



Formatting of this note might be different from the original.

 

Patient:   CATRACHITO ANGEL   



Sex#: M                             

#: 1950             [place]#: [id number]

Location: [address] ICU N306-01   Accession#: [id number]

 

Ordering Provider:   GRISELDA BLEDSOE

Procedure Requested:  [id number] CT CHEST WO CONTRAST

Reason for Exam:  pneumothorax

Exam Ordered:        2021  1016

Begin exam date/time:  2021  1028

Exam Date/Time:      2021  1035

 

 

CT CHEST WO CONTRAST 



INDICATION: pneumothorax.



COMPARISON STUDY: Portable chest dated 2021.



TECHNIQUE:  Unenhanced axial images were obtained through the lungs and

upper abdomen. Coronal MIP images and coronal and sagittal multiplanar

reconstructions were also obtained.



FINDINGS: Image quality degraded by motion.



Life Support Devices: Gastric tube courses into the stomach. Partially

imaged shunt catheter courses along the anterior chest wall and into the

abdomen.



Lungs and Airways: Apical paraseptal emphysema and bullous disease. Mild

to moderate upper lobe predominant centrilobular emphysema. Diffuse

right greater than left interlobular septal thickening. Right greater

than left posterior relaxation atelectasis. Normal central airways.

Diffuse bronchial wall thickening. Peripheral endoluminal plugs.



Pleura: Small right greater than left bilateral pleural effusions with

features of partial loculation on the right. No pneumothorax.



Heart and Mediastinum: Atrophic thyroid. No axillary or supraclavicular

lymphadenopathy. No mediastinal, hilar or retrocrural lymphadenopathy.

Cardiomegaly. No pericardial effusion. Heavy coronary artery

calcifications. Aortic valve leaflet calcifications. Atherosclerosis of

the thoracic aorta and branch vessels. Gas in the pulmonary trunk is

likely iatrogenic. Small hiatus hernia. Patulous esophagus with mural

thickening.



Abdomen: Small upper abdominal ascites. Atherosclerosis of the abdominal

aorta and branch vessels.



Bones and Soft Tissues: Thoracic spondylosis. Diffuse body wall edema.



IMPRESSION

                                        1.  Diffuse interstitial edema.

                                        2.  Posterior relaxation and scattered s

ubsegmental atelectasis. No

pulmonary mass or confluent consolidation.

                                        3.  Cardiomegaly. Heavy coronary artery 

calcifications. Aortic valve

leaflet calcifications could represent calcific aortic stenosis.

                                        4.  Small bilateral pleural effusions wi

th features of partial

loculation on the right. No pneumothorax.

                                        5.  Small upper abdominal ascites.



READING SITE: [facility] Performing Organization  Address         City/State/ZIP Code  P

kathleen Number

 

   



                                         [facility]   





* XR Abdomen single view AP (2021  9:37 AM CST)



                                        Modality



                           Anatomical Region         Laterality 

 

                                        Computed Radiography



                                         Abdomen  











                                         Specimen

 









                                        Impressions

 

                                        



EDWIGE - 2021 11:01 AM CST



1. Enteric tube coiled in the proximal stomach with sidehole near the

cardia and tip near the GE junction.

                                        2. Nonobstructive bowel gas pattern.

                                        3. Heterogeneous opacities in the righ

t midlung zone. Recommend

attention on ordered follow-up CT.

 

COMMUNICATION: Finding of tube position was verbally communicated and

acknowledged via telephone to Angie Boles RN, at 2021 10:50 AM.

 

ATTESTATION STATEMENT: Staff radiologist has personally reviewed the

images and dictated, reviewed and/or edited the final report.

 

READING SITE: [facility]

 







                                        Narrative

 

                                        



EDWIGE - 2021 11:01 AM CST



 

Patient:  CATRACHITO ANGEL  

 

Sex#: M               

#: 1950       [place]#: [id number]

Location: [place] [id number]  Accession#: [id number]

 

Ordering Provider:  GRISELDA BLEDSOE

Procedure Requested: [id number] XR ABDOMEN SINGLE VIEW AP

Reason for Exam: NGT placement

Exam Ordered:    2021 0929

Begin exam date/time: 2021 0932

Exam Date/Time:   2021 0937

 

 

XR ABDOMEN SINGLE VIEW AP 

 

INDICATION: NGT placement.

 

COMPARISON: None.

 

TECHNIQUE: Supine abdomen.

 

FINDINGS: 

 

Support Devices: Enteric tube coiled in the proximal stomach with

sidehole near the cardia and tip near the GE junction.

 

Abdomen: Nonobstructive bowel gas pattern. No free air on this limited

supine image.

 

Lower Chest: Heterogeneous opacities in the right midlung zone.

 

Skeletal Structures and Soft Tissues: No acute osseous abnormality.

Normal soft tissues.

 







                                        Procedure Note

 

                                        



Vick Camara DO - 2021



Formatting of this note might be different from the original.

 

Patient:   CATRACHITO ANGEL   



Sex#: M                             

#: 1950             [place]#: [id number]

Location: [address]   Accession#: [id number]

 

Ordering Provider:   GRISELDA BLEDSOE

Procedure Requested:  [id number] XR ABDOMEN SINGLE VIEW AP

Reason for Exam:  NGT placement

Exam Ordered:        2021  0929

Begin exam date/time:  2021  0932

Exam Date/Time:      2021  0937

 

 

XR ABDOMEN SINGLE VIEW AP 



INDICATION: NGT placement.



COMPARISON: None.



TECHNIQUE: Supine abdomen.



FINDINGS: 



Support Devices: Enteric tube coiled in the proximal stomach with

sidehole near the cardia and tip near the GE junction.



Abdomen: Nonobstructive bowel gas pattern. No free air on this limited

supine image.



Lower Chest: Heterogeneous opacities in the right midlung zone.



Skeletal Structures and Soft Tissues: No acute osseous abnormality.

Normal soft tissues.



IMPRESSION

                                        1.  Enteric tube coiled in the proximal 

stomach with sidehole near the

cardia and tip near the GE junction.

                                        2.  Nonobstructive bowel gas pattern.

                                        3.  Heterogeneous opacities in the right

 midlung zone. Recommend

attention on ordered follow-up CT.



COMMUNICATION: Finding of tube position was verbally communicated and

acknowledged via telephone to Angie Boles RN, at 2021 10:50 AM.



ATTESTATION STATEMENT: Staff radiologist has personally reviewed the

images and dictated, reviewed and/or edited the final report.



READING SITE: [facility]









   



                 Performing Organization  Address         City/State/ZIP Code  P

kathleen Number

 

   



                                         [facility]   





* Green Top (2021  9:29 AM CST)







                                         Specimen

 





                                         Blood - Venous







   



                 Performing Organization  Address         City/[facility] Code  P

kathleen Number

 

   



                     [facility]                [address], 

11 





* XR Chest single view frontal (2021  8:56 AM CST)



Only the most recent of 2 results within the time period is included.



                                        Modality



                           Anatomical Region         Laterality 

 

                                        Computed Radiography



                                         Chest  











                                         Specimen

 









                                        Impressions

 

                                        



EDWIGE - 2021 10:01 AM CST



 

                                        1. Abnormal curvilinear lucency at the l

eft lateral lung base and

costophrenic angle with apparent deep sulcus. Although no definitive

pleural line is noted, findings are equivocal for a small pneumothorax

given history of trauma. Contrast-enhanced chest CT is recommended for

further evaluation.

 

                                        2. No focal airspace disease.

 

Results were communicated by telephone to patient's nurse Angie by Dr. Wali Garrett on 2021 at 9:56 AM.

 

READING SITE: [facility]

 

ATTESTATION STATEMENT:

The Staff Radiologist has personally reviewed this study and agrees with

the findings in this report.







                                        Narrative

 

                                        



EDWIGE - 2021 10:01 AM CST



 

Patient:  CATRACHITO ANGEL  

 

Sex#: M               

#: 1950       [place]#: [id number]

Location: [place] [address]  Accession#: [id number]

 

Ordering Provider:  ROBERTA BAILEY 

Procedure Requested: [id number] XR CHEST SINGLE VIEW FRONTAL

Reason for Exam: COPD

Exam Ordered:    2021 0837

Begin exam date/time: 2021 0840

Exam Date/Time:   2021 0856

 

 

XR CHEST SINGLE VIEW FRONTAL 

 

INDICATION: COPD. Trauma.

 

COMPARISON STUDY: Portable chest radiograph 2021.

 

FINDINGS:

 

Life support devices: Ventriculoperitoneal shunt catheter projects over

the left hemithorax.

 

Lungs: Low lung volumes.. No focal airspace disease. Normal pulmonary

vasculature.

 

Pleura: Curvilinear lucency at the left lateral lung base and

costophrenic angle with appearance of a deep sulcus. No definitive

pleural line is noted. No pleural effusion.

 

Heart and Mediastinum: Stable cardiomediastinal silhouette and great

vessels. 

 

Bones and Soft Tissues: Stable regional skeleton and soft tissues.

 







                                        Procedure Note

 

                                        



Se Morejon MD - 2021



Formatting of this note might be different from the original.

 

Patient:   CATRACHITO ANGEL   



Sex#: M                             

#: 1950             [place]#: [id number]

Location: [place] [address]   Accession#: [id number]

 

Ordering Provider:   ROBERTA BAILEY 

Procedure Requested:  [id number] XR CHEST SINGLE VIEW FRONTAL

Reason for Exam:  COPD

Exam Ordered:        2021  0837

Begin exam date/time:  2021  0840

Exam Date/Time:      2021  0856

 

 

XR CHEST SINGLE VIEW FRONTAL 



INDICATION: COPD. Trauma.



COMPARISON STUDY: Portable chest radiograph 2021.



FINDINGS:



Life support devices: Ventriculoperitoneal shunt catheter projects over

the left hemithorax.



Lungs: Low lung volumes.. No focal airspace disease. Normal pulmonary

vasculature.



Pleura: Curvilinear lucency at the left lateral lung base and

costophrenic angle with appearance of a deep sulcus. No definitive

pleural line is noted. No pleural effusion.



Heart and Mediastinum: Stable cardiomediastinal silhouette and great

vessels. 



Bones and Soft Tissues: Stable regional skeleton and soft tissues.



IMPRESSION



                                        1. Abnormal curvilinear lucency at the l

eft lateral lung base and

costophrenic angle with apparent deep sulcus. Although no definitive

pleural line is noted, findings are equivocal for a small pneumothorax

given history of trauma. Contrast-enhanced chest CT is recommended for

further evaluation.



                                        2. No focal airspace disease.



Results were communicated by telephone to patient's nurse Angie by Dr. Wali Garrett on 2021 at 9:56 AM.



READING SITE: [facility]



ATTESTATION STATEMENT:

The Staff Radiologist has personally reviewed this study and agrees with

the findings in this report.







   



                 Performing Organization  Address         City/State/ZIP Code  P

kathleen Number

 

   



                                         [facility]   





* Electrocardiogram (ECG) (2021  6:29 AM CST)



Only the most recent of 3 results within the time period is included.



    



              Component    Value        Ref Range    Performed At  Pathologist



                                         Signature

 

    



                     QRSd                80                  [facility] 

 

    



                     QT                  332                 [facility] 

 

    



                     QTC                 443                 [facility] 

 

    



                     ECGHR               107                 [facility] 

 

    



                     ECGPR               131                 [facility] 











                                         Specimen

 









                                        Narrative

 

                                        



[facility] - 2021  9:09 AM CST



This result has an attachment that is not available.



            [facility]

                    

                    Test Date:  
2021

Pat Name:   [facility]    Department:  [facility]

Patient ID:  [id number]         Room:     [facility]

Gender:    Male           Technician:  [id number]

:     1950        Requested By: GRISELDA DYER

Order Number: [id number]        Reading MD:  Dylon Schaeffer

                 Measurements

Intervals               Axis     

Rate:     107           P:      61

ME:      131           QRS:     20

QRSD:     80            T:      93

QT:      332                  

QTc:     443                  

              Interpretive Statements

SINUS TACHYCARDIA

BASELINE ARTIFACT

Electronically Signed On 2021 9:09:52 CST by Dylon Schaeffer







                                        Procedure Note

 

                                        



Dylon Schaeffer MD - 2021



Formatting of this note might be different from the original.

                        [facility]

                                       

                                       Test Date:    2021

Pat Name:     [facility]       Department:   [facility]

Patient ID:   [id number]                 Room:         [facility]

Gender:       Male                     Technician:   [id number]

:          1950               Requested By: GRISELDA DYER

Order Number: [id number]                Reading MD:   Dylon Schaeffer

                                 Measurements

Intervals                              Axis          

Rate:         107                      P:            61

ME:           131                      QRS:          20

QRSD:         80                       T:            93

QT:           332                                    

QTc:          443                                    

                           Interpretive Statements

SINUS TACHYCARDIA

BASELINE ARTIFACT

Electronically Signed On 2021 9:09:52 CST by Dylon Schaeffer







   



                 Performing Organization  Address         City/[facility] Code  P

kathleen Number

 

   



                                         [facility]   





* Hepatic Function Panel (2021  5:14 AM CST)



    



              Component    Value        Ref Range    Performed At  Pathologist



                                         Signature

 

    



                 Protein Total   6.0             5.7 - 8.2 g/dL  [facility] 



                                         Serum    

 

    



                 Albumin         3.9             3.5 - 5.0 g/dL  [facility] 

 

    



                 Alkaline        63Comment: Reference range  46 - 116 U/L    [facility]

L 



                           Phosphatase               updated 10/24/21 with [facility] 

  



                                         conversion to [employer]   



                                         instrumentation.   

 

    



                 Alanine         146 (H)         0 - 49 U/L      [facility] 



                                         [employer]    



                                         e    

 

    



                 Aspartate       245 (H)Comment: Reference  0 - 34 U/L      [facility]

 



                           [employer]           range updated 10/24/21 with

   



                           e                         [facility] conversion to [employer] 

  



                                         [employer] instrumentation.   

 

    



                 Bilirubin       0.30            0.00 - 0.30 mg/dL  [facility] 



                                         Direct    

 

    



                 Bilirubin Total  0.50            0.20 - 1.10 mg/dL  [facility] 











                                         Specimen

 





                                         Blood - Venous







   



                 Performing Organization  Address         City/[facility]/ZIP Code  P

kathleen Number

 

   



                     [facility]                [address] [place] [address]

11 





* Troponin-I HS 6HR (2021  5:14 AM CST)



    



              Component    Value        Ref Range    Performed At  Pathologist



                                         Signature

 

    



                 Troponin I, 6HR  316 (HH)        <3-53 pg/mL pg/mL  [facility] 



                                         HS    

 

    



                 Delta 6HR Trop  145 (HH)        -8-<8           [facility] 











                                         Specimen

 





                                         Blood - Venous







                                        Narrative

 

                                        



[facility] - 2021  6:17 AM CST



[facility] converted from Troponin I ([facility]) to 
High-Sensitivity Troponin I ([facility]) on 2021.







   



                 Performing Organization  Address         City/[facility] Code  P

kathleen Number

 

   



                     [facility]                [address] [address]

11 





* CT Head wo contrast (2021  4:59 AM CST)



Only the most recent of 2 results within the time period is included.



                                        Modality



                           Anatomical Region         Laterality 

 

                                        Computed Tomography



                                         Head  











                                         Specimen

 









                                        Impressions

 

                                        



[facility] - 2021  3:48 PM CST



Impression:

 

                                        1. Stable mixed attenuation left holohem

ispheric extra-axial collection

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

                                        5 mm in maximal thickness. No new or enl

arging intracranial hemorrhage.

No significant mass effect or discrete midline shift. Similar

ventriculomegaly.

                                        2. Stable gray-white loss involving the 

right frontotemporal lobes and

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

 

                                        3. Stable right parietal approach ventri

cular shunt catheter. 

                                        4. Redemonstrated postprocedural changes

 of prior aneurysm coiling are

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

given location.

 

 

ATTESTATION STATEMENT: The Staff Radiologist has personally reviewed the

images and dictated, reviewed, or edited the final report.







                                        Narrative

 

                                        



[facility] - 2021  3:48 PM CST



 

Patient:  CATRACHITO ANGEL  

 

Sex#: M               

#: 1950       Winston#: [id number]

Location: [address] ICU [address]01  Accession#: [id number]

 

Ordering Provider:  STELLA RENDON 

Procedure Requested: [id number] CT HEAD WO CONTRAST

Reason for Exam: SDH, AMS

Exam Ordered:    2021 0500

Begin exam date/time: 2021 0450

Exam Date/Time:   2021 0459

 

 

CT HEAD WO CONTRAST 

 

Date: 2021 4:59 AM 

 

Clinical Indication: SDH, AMS

 

Comparison: CT head 2021.

 

Technique: 5 mm axial tomographic images were obtained of the head

without contrast. These were viewed on brain and bone windows. One or

more of the following dose reduction techniques were utilized: Automated

exposure control (AEC), Adjustment of mA and/or kV according to patient

size, Use of iterative reconstruction technique such as ASiR, CT scan

done according to ALARA and image gently/image wisely. 

 

Findings:

 

Stable mixed attenuation left holohemispheric extra-axial collection

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

                                        5 mm in maximal thickness (series 2, ruiz

ge 21). No new or enlarging

intracranial hemorrhage. No significant mass effect or discrete midline

shift. Similar ventriculomegaly.

 

Stable right parietal approach ventricular shunt catheter is identified

with the distal intracranial end terminating near the foramen of Cook.

Expected subtle encephalomalacia along the shunt catheter tract.

Redemonstrated additional high right frontal lobe encephalomalacia with

linear calcification projecting towards a round calvarial defect could

represent sequelae of prior, now removed right frontal approach

ventriculostomy catheter.

 

Redemonstrated postprocedural changes of prior aneurysm coiling are

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

given location.

 

Stable gray-white loss involving the right frontotemporal lobes and

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

 

 

Mild generalized cerebral and cerebellar volume loss. Mild nonspecific

periventricular hypoattenuation, most commonly seen with chronic small

vessel ischemic disease. Calcified atherosclerosis of the bilateral

cavernous and paraclinoid internal carotid arteries and intracranial

vertebral arteries.

 

The subarachnoid cisterns are patent. 

 

Mild pansinus mucosal thickening.. The visualized portions of the

orbits and globes are normal. The mastoid air cells are clear. 

 

The  topogram shows no lytic lesion or fracture. 

 







                                        Procedure Note

 

                                        



Onesimo Solano MD - 2021



Formatting of this note might be different from the original.

 

Patient:   CATRACHITO ANGEL   



Sex#: M                             

#: 1950             Winston#: [id number]

Location: [address]   Accession#: [id number]

 

Ordering Provider:   STELLA RENDON 

Procedure Requested:  [id number] CT HEAD WO CONTRAST

Reason for Exam:  SDH, AMS

Exam Ordered:        2021  0500

Begin exam date/time:  2021  0450

Exam Date/Time:      2021  0459

 

 

CT HEAD WO CONTRAST 



Date: 2021 4:59 AM 



Clinical Indication: SDH, AMS



Comparison: CT head 2021.



Technique:  5 mm axial tomographic images were obtained of the head

without contrast. These were viewed on brain and bone windows. One or

more of the following dose reduction techniques were utilized: Automated

exposure control (AEC), Adjustment of mA and/or kV according to patient

size, Use of iterative reconstruction technique such as ASiR, CT scan

done according to ALARA and image gently/image wisely. 



Findings:



Stable mixed attenuation left holohemispheric extra-axial collection

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

                                        5 mm in maximal thickness (series 2, ruiz

ge 21). No new or enlarging

intracranial hemorrhage. No significant mass effect or discrete midline

shift. Similar ventriculomegaly.



Stable right parietal approach ventricular shunt catheter is identified

with the distal intracranial end terminating near the foramen of Cook.

Expected subtle encephalomalacia along the shunt catheter tract.

Redemonstrated additional high right frontal lobe encephalomalacia with

linear calcification projecting towards a round calvarial defect could

represent sequelae of prior, now removed right frontal approach

ventriculostomy catheter.



Redemonstrated postprocedural changes of prior aneurysm coiling are

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

given location.



Stable gray-white loss involving the right frontotemporal lobes and

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





Mild generalized cerebral and cerebellar volume loss. Mild nonspecific

periventricular hypoattenuation, most commonly seen with chronic small

vessel ischemic disease. Calcified atherosclerosis of the bilateral

cavernous and paraclinoid internal carotid arteries and intracranial

vertebral arteries.



The subarachnoid cisterns are patent. 



Mild pansinus mucosal thickening..  The visualized portions of the

orbits and globes are normal. The mastoid air cells are clear. 



The  topogram shows no lytic lesion or fracture. 



IMPRESSION

Impression:



                                        1. Stable mixed attenuation left holohem

ispheric extra-axial collection

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

                                        5 mm in maximal thickness. No new or enl

arging intracranial hemorrhage.

No significant mass effect or discrete midline shift. Similar

ventriculomegaly.

                                        2. Stable gray-white loss involving the 

right frontotemporal lobes and

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



                                        3. Stable right parietal approach ventri

cular shunt catheter. 

                                        4. Redemonstrated postprocedural changes

 of prior aneurysm coiling are

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

given location.





ATTESTATION STATEMENT: The Staff Radiologist has personally reviewed the

images and dictated, reviewed, or edited the final report.







   



                 Performing Organization  Address         City/State/ZIP Code  P

kathleen Number

 

   



                                         [facility]   





* Antibody Screen (2021  1:04 AM CST)



    



              Component    Value        Ref Range    Performed At  Pathologist



                                         Signature

 

    



                     Antibody Screen     Negative            [facility] 



                                         [facility] 











                                         Specimen

 





                                         Blood - Venous







   



                 Performing Organization  Address         City/State/ZIP Code  P

kathleen Number

 

   



                     [facility]  [address] [place] 

[address] 



                                         BLOOD BANK   





* ABORH Type (2021  1:04 AM CST)



    



              Component    Value        Ref Range    Performed At  Pathologist



                                         Signature

 

    



                     ABORH Type          A Positive          [facility] 



                                         [facility] 











                                         Specimen

 





                                         Blood - Venous







   



                 Performing Organization  Address         City/[facility] Code  P

kathleen Number

 

   



                     [facility]  [address] [place] 

[address] 



                                         BLOOD BANK   





* Retype Patient ABORH (2021  1:02 AM CST)



Only the most recent of 2 results within the time period is included.



    



              Component    Value        Ref Range    Performed At  Pathologist



                                         Signature

 

    



                     ABORH Type          A Positive          [facility] 



                                         [facility] 

 

    



                     Confirm Blood       Yes                 [facility] 



                                         [facility] 











                                         Specimen

 





                                         Blood - Venous







   



                 Performing Organization  Address         City/[facility]/ZIP Code  P

kathleen Number

 

   



                     [facility]  [address] [place] 

[address] 



                                         BLOOD BANK   





* Procalcitonin (2021  1:02 AM CST)



    



              Component    Value        Ref Range    Performed At  Pathologist



                                         Signature

 

    



                 Procalcitonin   0.09 (H)        <0.05 ng/mL     [facility] 











                                         Specimen

 





                                         Blood - Venous







   



                 Performing Organization  Address         City/[facility] Code  P

kathleen Number

 

   



                     [facility]                [address] [address]

11 





* Troponin-I HS 2HR (2021  1:02 AM CST)



    



              Component    Value        Ref Range    Performed At  Pathologist



                                         Signature

 

    



                 Troponin I, 2HR  250 (HH)        <3-53 pg/mL pg/mL  [facility] 



                                         HS    

 

    



                 Delta 2HR Trop  79 (HH)         -8-<8           [facility] 











                                         Specimen

 





                                         Blood - Venous







                                        Narrative

 

                                        



[facility] - 2021  2:23 AM CST



[facility] converted from Troponin I ([facility]) to 
High-Sensitivity Troponin I ([facility]) on 2021.







   



                 Performing Organization  Address         City/[facility]/ZIP Code  P

kathleen Number

 

   



                     [facility]                [address] [address]

11 





* Extra Urine Specimen in Grey Tube (2021 11:16 PM CST)



    



              Component    Value        Ref Range    Performed At  Pathologist



                                         Signature

 

    



                     RAINBOW DRAW        [place] Draw/Extra Tube Hold   [facility] 



                           HOLD SPECIMENS            Specimen   











                                         Specimen

 





                                         Urine - Urine Clean Catch







   



                 Performing Organization  Address         City/[facility] Code  P

kathleen Number

 

   



                     [facility]                [address] [address]

11 





* COVID-19 Universal Admission PCR (Non-PUI) (2021 11:10 PM CST)



    



              Component    Value        Ref Range    Performed At  Pathologist



                                         [facility]

 

    



                 SARS-COV-2 PCR  Negative        Negative        [facility] 











                                         Specimen

 





                                         Swab - NASOPHARYNGEAL



                                         SWAB







                                        Narrative

 

                                        



[facility] - 2021 11:27 AM CST



This RT-PCR test has been authorized by the FDA under an Emergency Use 
Authorization (EUA) for use by authorized laboratories.







   



                 Performing Organization  Address         City/[facility] Code  P

kathleen Number

 

   



                     [facility]                [address], [place] [address]

11 





* Urinalysis Microscopic Only (2021 11:09 PM CST)



    



              Component    Value        Ref Range    Performed At  Pathologist



                                         Signature

 

    



                 Microscopic RBC  6-10 (A)        0 - 5 /hpf      [facility] 



                                         Urine    

 

    



                 Microscopic WBC  >40 (A)         0 - 5 /hpf      [facility] 



                                         Urine    

 

    



                 Epithelial      Absent          Absent          [facility] 



                                         Cells    

 

    



                 Hyaline Cast    Small (A)       Absent          [facility] 

 

    



                 Bacteria        Absent          Absent          [facility] 











                                         Specimen

 





                                         Urine - Urine







   



                 Performing Organization  Address         City/[facility]/ZIP Code  P

kathleen Number

 

   



                     [facility]                [address] [address]

11 





* Toxicology Screening Panel (2021 11:09 PM CST)



    



              Component    Value        Ref Range    Performed At  Pathologist



                                         [facility]

 

    



                 Tetrahydrocanna  Not Detected    Not Detected    [facility] 



                                         binol Urine    

 

    



                 Phencyclidine   Not Detected    Not Detected    [facility] 



                                         Urine    

 

    



                 Cocaine Urine   Not Detected    Not Detected    [facility] 

 

    



                 Methamphetamine  Not Detected    Not Detected    [facility] 



                                         s Urine    

 

    



                 Opiates Urine   Not Detected    Not Detected    [facility] 

 

    



                 Amphetamines    Not Detected    Not Detected    [facility] 



                                         Urine    

 

    



                 Benzodiazepines  Not Detected    Not Detected    [facility] 



                                         Urine    

 

    



                 Tricyclic       Not Detected    Not Detected    [facility] 



                                         Antidepressants    

 

    



                 Methadone Urine  Not Detected    Not Detected    [facility] 

 

    



                 Barbiturates    Not Detected    Not Detected    [facility] 



                                         Urine    

 

    



                 Oxycodone Urine  Not Detected    Not Detected    [facility] 











                                         Specimen

 





                                         Urine - Urine







                                        Narrative

 

                                        



[facility] - 2021 12:15 AM CST



Toxicology cutoff values:

Assay      Cutoff value  Assay           
Cutoff value

Amphetamines    500 ng/mL   Methamphetamines      500 
ng/mL

Barbiturates    200 ng/mL   Opiates           100 
ng/mL

Benzodiazepines  150 ng/mL   Oxycodone          100 
ng/mL

Cocaine      150 ng/mL   Phencyclidine        25
 ng/mL

Methadone     200 ng/mL   THC             
50 ng/mL

                 Tricyclic Antidepressants  
300 ng/mL

This drug screen provides presumptive results for medical purposes only. False 
positive results may occur. Physicians should order confirmatory testing on this
 sample if the results are considered clinically significant.







   



                 Performing Organization  Address         City/State/ZIP Code  P

kathleen Number

 

   



                     [facility]                [address], [place] [address]

11 





* Urinalysis (includes microscopic review, if indicated) (2021 11:09 PM 
  CST)



    



              Component    Value        Ref Range    Performed At  Pathologist



                                         Signature

 

    



                 Appearance,     Yellow          Colorless, Yellow,  [facility] 



                           Urine                     Dark Yellow  

 

    



                 Glucose Urine   Negative        Negative mg/dL  [facility] 

 

    



                 Bilirubin Urine  Negative        Negative        [facility] 

 

    



                 Ketones Urine   Small (A)       Negative        [facility] 

 

    



                 Specific        1.019           >1.005-<1.030   [facility] 



                                         Gravity Urine    

 

    



                 Hemoglobin      Moderate (A)    Negative        [facility] 



                                         Urine    

 

    



                 PH Urine        5.5             5.0 - 8.0       [facility] 

 

    



                 Protein Urine   100   (A)       Negative, Trace  [facility] 



                           Qual                      mg/dL  

 

    



                 Urobilinogen    Normal          Normal, Negative,  [facility] 



                           Urine                     1.0 EU/dL  

 

    



                 Nitrite Urine   Negative        Negative        [facility] 

 

    



                 Leukocyte       Positive (A)    Negative        [facility] 



                                         Esterase    











                                         Specimen

 





                                         Urine - Urine







   



                 Performing Organization  Address         City/State/ZIP Code  P

[facility] Number

 

   



                     [facility]                [address] [address]

11 





* XR Pelvis one or two views (2021 11:08 PM CST)



                                        Modality



                           Anatomical Region         Laterality 

 

                                        Computed Radiography



                                         Pelvis  











                                         Specimen

 









                                        Impressions

 

                                        



[facility] - 2021 11:25 PM CST



Negative for acute pelvic fracture. 

 

READING SITE: [facility]

 

THIS DOCUMENT HAS BEEN ELECTRONICALLY SIGNED BY ALEX GIBBONS - 2021 11:25 PM CST



 

Patient:  Catrachito Angel 

 

Sex#: M               

#: 1950       Winston#: [id number]

Location: [facility] ED  Accession#: [id number]

 

Ordering Provider:  SARAH LUGO 

Procedure Requested: [id number] XR PELVIS ONE OR TWO VIEWS

Reason for Exam: trauma

Exam Ordered:    2021 2255

Begin exam date/time: 2021

Exam Date/Time:   2021

 

 

PROCEDURE INFORMATION: 

Exam: XR Pelvis 

Exam date and time: 2021 11:08 PM 

Age: 71 years old 

Clinical indication: Other: Trauma 

 

TECHNIQUE: 

Imaging protocol: XR pelvis. 

Views: 1 or 2 view. 

 

COMPARISON: 

No relevant prior studies available. 

 

FINDINGS: 

Bones/joints: No acute fractures. Hip joint alignments are unremarkable. 

Sclerosis of the cortical acetabular surfaces with mild joint space loss 

bilaterally. 

Soft tissues: Unremarkable. 

Vasculature: Mild degree of scattered atherosclerosis. 

 







                                        Procedure Note

 

                                        



Alex Goncalves MD - 2021



Formatting of this note might be different from the original.

 

Patient:   Catrachito Angel  



Sex#: ADOLFO                             

#: 1950             Winston#: [id number]

Location: [facility] ED    Accession#: [id number]

 

Ordering Provider:   SARAH LUGO 

Procedure Requested:  [id number] XR PELVIS ONE OR TWO VIEWS

Reason for Exam:  trauma

Exam Ordered:        2021

Begin exam date/time:  2021

Exam Date/Time:      2021

 

 

PROCEDURE INFORMATION: 

Exam: XR Pelvis 

Exam date and time: 2021 11:08 PM 

Age: 71 years old 

Clinical indication: Other: Trauma 



TECHNIQUE: 

Imaging protocol: XR pelvis. 

Views: 1 or 2 view. 



COMPARISON: 

No relevant prior studies available. 



FINDINGS: 

Bones/joints: No acute fractures. Hip joint alignments are unremarkable. 

Sclerosis of the cortical acetabular surfaces with mild joint space loss 

bilaterally. 

Soft tissues: Unremarkable. 

Vasculature: Mild degree of scattered atherosclerosis. 



IMPRESSION

Negative for acute pelvic fracture. 



READING SITE: [facility]



THIS DOCUMENT HAS BEEN ELECTRONICALLY SIGNED BY ALEX GONCALVES MD







   



                 Performing Organization  Address         City/State/ZIP Code  P

kathleen Number

 

   



                                         [facility]   





* Critical Care (2021 10:21 PM CST)



                                        Modality



                           Anatomical Region         Laterality 

 

                                        Other







                                        Narrative

 

                                        



Sarah Lugo MD - 2021 10:21 PM CST



Sarah Lugo MD   2021 12:45 AM

Critical Care

Performed by: Sarah Lugo MD

Authorized by: Sarah Lugo MD 

 

Critical care provider statement: 

 Critical care time (minutes): 32

 Critical care time was exclusive of: Separately billable procedures and 

treating other patients

 Critical care was necessary to treat or prevent imminent or 

life-threatening deterioration of the following conditions: CNS failure 

or compromise, dehydration, circulatory failure, cardiac failure and 

respiratory failure

 Critical care was time spent personally by me on the following 

activities: Development of treatment plan with patient or surrogate, 

discussions with consultants, evaluation of patient's response to 

treatment, examination of patient, obtaining history from patient or 

surrogate, review of old charts, re-evaluation of patient's condition, 

pulse oximetry, ordering and review of radiographic studies, ordering and 

review of laboratory studies and ordering and performing treatments and 

interventions





documented in this encounter



Visit Diagnoses









                                         Diagnosis

 





                                         Encephalopathy - Primary



                                         Unspecified encephalopathy

 





                                         Altered mental status, unspecified alte

red mental status type

 





                                         Subdural hemorrhage (HCC)



                                         Subdural hemorrhage

 





                                         Dehydration

 





                                         UTI (urinary tract infection)



                                         Urinary tract infection, site not speci

fied

 





                                         SDH (subdural hematoma) (HCC)



                                         Subdural hemorrhage

 





                                         History of cerebral aneurysm

 





                                         S/P  shunt



                                         Presence of cerebrospinal fluid drainag

e device

 





                                         COPD (chronic obstructive pulmonary dis

ease) (HCC)



                                         Chronic airway obstruction, not elsewhe

re classified

 





                                         Bronchitis



                                         Bronchitis, not specified as acute or c

hronic

 





                                         Elevated INR



                                         Abnormal coagulation profile

 





                                         GREGORY (acute kidney injury) (HCC)

 





                                         Hypernatremia



                                         Hyperosmolality and/or hypernatremia

 





                                         Other specified anemias

 





                                         Elevated troponin



                                         Other abnormal blood chemistry

 





                                         Acute pain due to trauma

 





                                         Coagulopathy (HCC)



                                         Other and unspecified coagulation defec

ts

 





                                         Acute encephalopathy

 





                                         Chronic respiratory failure with hypoxi

a (HCC)

 





                                         COPD with acute exacerbation (HCC)

 





                                         Leukocytosis



                                         Leukocytosis, unspecified

 





                                         Anemia



                                         Unspecified anemia

 





                                         Transaminitis



                                         Nonspecific elevation of levels of novoa

saminase or lactic acid dehydrogenase 

(LDH)

 





                                         Hx of hepatitis C



documented in this encounter



Administered Medications





                Action Date     Dose            Rate            Site



                           Medication Order          MAR Action    

 

                2021  8:42 AM  mg          112.8 mL/hr      



                           acyclovir (ZOVIRAX) 640 mg in dextrose  New Bag    



                                         (D5W) 5 % 100 mL IVPB     



                                         640 mg (rounded from 638 mg = 10 mg/kg 

     



                                         63.8 kg Ideal weight), Intravenous, at 

    



                                         112.8 mL/hr, Every 12 hours scheduled, 

    



                                         Indications: CNS INFECTION, First dose 

    



                                         on Thu 21 at 2100     

 

                    640 mg              112.8 mL/hr          



                           New Bag                   2021   



                                         10:51 PM CST   

 

                    640 mg              112.8 mL/hr          



                           New Bag                   2021   



                                         9:07 AM CST   

 

  

 

  



                                         alteplase (CATHFLO ACTIVASE) injection 

1 



                                         mg 



                                         1 mg, Intra-Catheter, As needed, 



                                         declotting central catheter or 



                                         sluggish/occluded CVC line, Starting on

 



                                         21 at 0939, Use 1 mg/mL to 



                                         declot catheter as needed, Declot 



                                         catheter per Central Venous Access 



                                         Device, Declotting procedure in 



                                         [place] **REFRIGERATE** 

 

  

 

                2021  5:21 AM CST 2 g             150 mL/hr        



                           ampicillin (OMNIPEN) 2 g in sodium  New Bag    



                                         chloride ADDV (NS) 100 mL IVPB     



                                         2 g, Intravenous, at 150 mL/hr, Every 6

     



                                         hours scheduled, Indications: CNS     



                                         INFECTION, First dose on Thu 21 a

t     



                                         2130     

 

                                        150 mL/hr            



                           Rate/Dose Verify          2021   



                                         10:13 PM CST   

 

                    2 g                 150 mL/hr            



                           New Bag                   2021   



                                         10:12 PM CST   

 

  

 

                2021  8:39 AM CST 10 mg                            



                           bisacodyL (DULCOLAX) suppository 10 mg  Given    



                                         10 mg, Rectal, Daily, First dose on Sat

     



                                         21 at 0930     

 

                    10 mg                                    



                           Given                     2021   



                                         9:52 AM CST   

 

                    10 mg                                    



                           Given                     2021   



                                         10:50 AM CST   

 

  

 

                2021  8:32 AM CST 2 g             100 mL/hr        



                           cefepime (MAXIPIME) 2 g in sodium  New Bag    



                                         chloride ADDV (NS) 50 mL IVPB     



                                         2 g, Intravenous, at 100 mL/hr, Every 1

2     



                                         hours scheduled, Indications: CNS     



                                         INFECTION, First dose on Thu 21 a

t     



                                         2115     

 

                    2 g                 100 mL/hr            



                           New Bag                   2021   



                                         10:16 PM CST   

 

                    2 g                 100 mL/hr            



                           New Bag                   2021   



                                         8:02 AM CST   

 

  

 

  



                                         dextrose 50% (D50W) syringe 25-50 mL 



                                         25-50 mL, Intravenous, As needed, low 



                                         blood sugar, Starting on Thu 21 a

t 



                                         0115, Give if patient NPO and IV access

 



                                         already available. If no IV access give

 



                                         Glucagon SQ or IM in arm and turn 



                                         patient on side.  For blood glucose 



                                         (BG):   Less than 50 mg/dL: Give D50W 5

0 



                                         mL. Check BG every 15 minutes and repea

t 



                                         until greater than 80 mg/dL.  Less than

 



                                         70 mg/dL: Give D50W 25 mL. Check BG 



                                         every 15 minutes and repeat until 



                                         greater than 80 mg/dL.  Less than 70 



                                         mg/dL and patient unconscious: Give D50

W 



                                         50 mL. Call physician for additional 



                                         orders. Check BG every 15 minutes and 



                                         repeat until greater than 80 mg/dL. 



                                         Once blood glucose greater than 80 



                                         mg/dL, check BG in one hour. Call 



                                         physician if less than 70 mg/dL. 

 

  

 

                2021 10:17 PM CST 20 mg                            



                           famotidine (PEPCID) injection 20 mg  Given    



                                         20 mg, Intravenous, Every 12 hours,    

 



                                         First dose on 21 at 2315,    

 



                                         Pharmacy to adjust dosing for renal    

 



                                         insufficiency.     

 

                    20 mg                                    



                           Given                     2021   



                                         11:59 AM CST   

 

                    20 mg                                    



                           Given                     2021   



                                         11:54 PM CST   

 

  

 

  



                                         famotidine (PEPCID) tablet 20 mg 



                                         20 mg, Oral, Every 12 hours, First dose

 



                                         on 21 at 2315, Pharmacy to 



                                         adjust dosing for renal insufficiency. 

 

  

 

  



                                         flumazeniL (ROMAZICON) injection 0.2 mg

 



                                         0.2 mg, Intravenous, As needed, clinica

l 



                                         evidence of impending respiratory 



                                         failure, difficult to arouse, etc., 



                                         Starting on Sat 21 at 0948, For 4

 



                                         doses, Call physician immediately if 



                                         administered.  May repeat 0.2 mg IV 



                                         every 60 seconds for a maximum of 4 



                                         doses. 

 

  

 

  



                                         glucagon (GLUCAGEN) injection 1 mg 



                                         1 mg, Intramuscular, As needed, low 



                                         blood sugar, low blood sugar, Starting 



                                         on Thu 21 at 0115, Give if patien

t 



                                         NPO and no IV access. May give IM or SQ

 



                                         in arm and turn patient on side. 



                                         Reconstitute powder for injection by 



                                         adding 1 mL of -supplied 



                                         sterile diluent or sterile water for 



                                         injection to a vial containing 1 unit o

f 



                                         the drug, to provide solutions 



                                         containing 1 mg of glucagon/mL. Shake 



                                         vial gently to dissolve. 

 

  

 

  



                                         glucagon (GLUCAGEN) injection 1 mg 



                                         1 mg, Subcutaneous, As needed, low bloo

d 



                                         sugar, low blood sugar, Starting on Thu

 



                                         21 at 0115, Give if patient NPO 



                                         and no IV access. May give IM or SQ in 



                                         arm and turn patient on side. 



                                         Reconstitute powder for injection by 



                                         adding 1 mL of -supplied 



                                         sterile diluent or sterile water for 



                                         injection to a vial containing 1 unit o

f 



                                         the drug, to provide solutions 



                                         containing 1 mg of glucagon/mL. Shake 



                                         vial gently to dissolve. 

 

  

 

  



                                         glucose chewable tablet 16-32 g 



                                         16-32 g, Oral, As needed, low blood 



                                         sugar, Starting on Thu 21 at 0115

, 



                                         Give food, drink, or glucose tablets to

 



                                         treat low blood glucose if patient able

 



                                         to eat.  For blood glucose (BG): Less 



                                         than 50 mg/dL: Give 30 g of carbohydrat

e 



                                         (32 g if using glucose tablets). Check 



                                         BG every 15 minutes and repeat until 



                                         greater than 80 mg/dL.  Less than 70 



                                         mg/dL: Give 15 g of carbohydrate (16 g 



                                         if using glucose tablets). Check BG 



                                         every 15 minutes and repeat until 



                                         greater than 80 mg/dL.  Less than 70 



                                         mg/dL and patient unconscious: BG to be

 



                                         treated with D50W until greater than 80

 



                                         mg/dL. Once BG greater than 80 mg/dL, 



                                         give 30 g of carbohydrate (32 g if usin

g 



                                         glucose tablets) if patient awake and 



                                         able to swallow.   Once blood glucose 



                                         greater than 80 mg/dL, check BG in one 



                                         hour. Call physician if less than 70 



                                         mg/dL. 

 

  

 

                2021  8:38 AM CST 2 mg                             



                           guanFACINE (TENEX) tablet 2 mg  Given    



                                         2 mg, Per NG tube, 2 times daily, First

     



                                         dose (after last modification) on Sat  

   



                                         21 at 0900     

 

                    2 mg                                     



                           Given                     2021   



                                         10:26 PM CST   

 

                    2 mg                                     



                           Given                     2021   



                                         10:03 AM CST   

 

  

 

  



                                         heparin (porcine) 5,000 unit/mL 



                                         injection 5,000 Units 



                                         5,000 Units, Subcutaneous, Every 8 



                                         hours, First dose on Mon 21 at 



                                         0945 

 

  

 

                2021  1:05 AM CST 10 mg                            



                           hydrALAZINE (APRESOLINE) injection 10-20  Given    



                                         mg     



                                         10-20 mg, Intravenous, Every 2 hours   

  



                                         PRN, high blood pressure, SBP > 160,   

  



                                         Starting on Thu 21 at 0033     

 

  

 

                2021 12:23 PM CST 2 Units                         Left Low

er Abdomen



                           insulin lispro (HumaLOG) injection 2-7  Given    



                                         Units     



                                         2-7 Units, Subcutaneous, Every 6 hours 

    



                                         scheduled, First dose on Sat 21 a

t     



                                         0830, LEVEL 3 - Give in addition to    

 



                                         scheduled mealtime insulin per table  D

O     



                                         NOT GIVE for any 2 hours post meal     



                                         fingerstick blood glucose checks. If pt

     



                                         NPO or on continuous enteral/parenteral

     



                                         nutrition - give with scheduled     



                                         fingerstick blood glucose check - dose 

    



                                         per table  Glucose (mg/dL)         Dose

     



                                         0-120                       0 units    

 



                                         121-150                       0 units  

   



                                         151-200                       2 units  

   



                                         201-250                       3 units  

   



                                         251-300                       4 units  

   



                                         301-350                       5 units  

   



                                         351-400                       6 units  

   



                                         >400                       7 units     



                                         Bedtime Admin Instructions: If glucose 

    



                                         level is less than 200, do not give any

     



                                         correction dose If glucose level is 200

     



                                         or greater give correction dose     



                                         according to sliding scale (see below) 

    



                                         and check BG @ 0000 and 0300  LEVEL 3 -

     



                                         Bedtime Only    Glucose (mg/dL)     



                                         Dose       0-199                       

0     



                                         units 200-250                       3  

   



                                         units 251-300                       4  

   



                                         units  301-350                       5 

    



                                         units  351-400                       6 

    



                                         units      >400                       7

     



                                         units     

 

                    2 Units                                 Right Upper Abdomen



                           Given                     2021   



                                         8:43 AM CST   

 

  

 

                2021  8:24 AM CST 3 mL                             



                           ipratropium-albuteroL (DUO-NEB) 0.5-3  Given    



                                         mg/3 mL nebulizer solution 3 mL     



                                         3 mL, Inhalation, 4 times daily, First 

    



                                         dose on Thu 21 at 0800     

 

                    3 mL                                     



                           Given                     2021   



                                         7:53 PM CST   

 

                    3 mL                                     



                           Given                     2021   



                                         5:05 PM CST   

 

  

 

                2021  1:38 AM CST 10 mg                            



                           labetaloL (NORMODYNE,TRANDATE) injection  Given    



                                         5-10 mg     



                                         5-10 mg, Intravenous, Every 4 hours PRN

,     



                                         SBP goal < 160, Starting on Mon 

1     



                                         at 0132     

 

  

 

  



                                         lipase-protease-amylase (VIOKACE) 



                                         10,440-39,150- 39,150 unit tablet 10,44

0 



                                         units of lipase 



                                         10,440 units of lipase, Per NG tube, As

 



                                         needed, for clogged feeding tube, 



                                         Starting on Fri 21 at 1126, Crush

 



                                         1 Viokace tablet and HALF sodium 



                                         bicarbonate tablet and mix with 5 mL 



                                         tepid bottled water.  Instill mixture 



                                         into the clogged tube and leave in tube

 



                                         to dwell for a minimum of 10 minutes (3

0 



                                         minutes max). Gently pull back and fort

h 



                                         on the syringe plunger to loosen the 



                                         clog. May repeat this one time. Flush 



                                         with 30 mL water once tube is cleared. 

 

  

 

                2021  4:05 AM CST 1 mg                             



                           LORazepam (ATIVAN) injection 1-2 mg  Given    



                                         1-2 mg, Intravenous, As needed, Base on

     



                                         CIWA-Ar Score, Starting on Sat 21

     



                                         at 0948, Score 0 to 8 - Give NO dose an

d     



                                         Reassess CIWA-Ar Score, vital signs    

 



                                         every 4 hours Score 9 to 15 - Give 1 mg

     



                                         and Reassess CIWA-Ar Score, vital signs

     



                                         and re-administer medication if needed 

    



                                         every 1 hour after each dose then based

     



                                         on the CIWA Score greater than 15 - Giv

e     



                                         2 mg Reassess CIWA-Ar Score, vital sign

s     



                                         and re-administer medication if needed 

    



                                         every 1 hour  **Hold Any Dose For Exces

s     



                                         Sedation** **IF PATIENT RECEIVES MORE  

   



                                         THAN 24 MG IN 24 HOUR PERIOD, CALL     



                                         PHYSICIAN FOR FURTHER DOSING     



                                         INSTRUCTIONS/REVISIONS** Maximum IV pus

h     



                                         rate of 2 mg/min; max IVP dose is 4 mg.

     

 

                    1 mg                                     



                           Given                     2021   



                                         3:05 AM CST   

 

                    1 mg                                     



                           Given                     2021   



                                         1:32 AM CST   

 

  

 

  



                                         LORazepam (ATIVAN) tablet 1-2 mg 



                                         1-2 mg, Oral, As needed, Based on 



                                         CIWA-Ar Score, Starting on Sat 21

 



                                         at 0948, Score 0 to 8 - Give NO dose an

d 



                                         Reassess CIWA-Ar Score, vital signs 



                                         every 4 hours Score 9 to 15 - Give 1 mg

 



                                         and Reassess CIWA-Ar Score, vital signs

 



                                         and re-administer medication if needed 



                                         every 1 hour after each dose then based

 



                                         on the CIWA Score greater than 15 - Giv

e 



                                         2 mg Reassess CIWA-Ar Score, vital sign

s 



                                         and re-administer medication if needed 



                                         every 1 hour  **Hold Any Dose For Exces

s 



                                         Sedation** **IF PATIENT RECEIVES MORE 



                                         THAN 24 MG IN 24 HOUR PERIOD, CALL 



                                         PHYSICIAN FOR FURTHER DOSING 



                                         INSTRUCTIONS/REVISIONS** 

 

  

 

  



                                         magnesium hydroxide (MILK OF MAGNESIA) 



                                         suspension 30 mL 



                                         30 mL, Oral, Daily PRN, constipation, 



                                         Starting on Sun 21 at 0643 

 

  

 

                2021  8:38 AM CST 1 tablet                         



                           multivitamin (THERAGRAN) tablet  Given    



                                         1 tablet, Oral, Daily, First dose on 21 at 1545     

 

                    1 tablet                                 



                           Given                     2021   



                                         8:19 AM CST   

 

                    1 tablet                                 



                           Given                     2021   



                                         4:27 PM CST   

 

  

 

  



                                         ondansetron (ZOFRAN) injection 4 mg 



                                         4 mg, Intravenous, Every 6 hours PRN, 



                                         nausea/vomiting (2nd line), Starting on

 



                                         21 at 2309 

 

  

 

                2021  8:51 AM CST 17 g                             



                           polyethylene glycol (GLYCOLAX) packet 17  Given    



                                         g     



                                         17 g, Oral, Daily, First dose on Sun   

  



                                         21 at 0900     

 

                    17 g                                     



                           Given                     2021   



                                         8:19 AM CST   

 

  

 

  



                                         prochlorperazine (COMPAZINE) injection 



                                         2.5-5 mg 



                                         2.5-5 mg, Intravenous, Every 4 hours 



                                         PRN, nausea/vomiting (1st line), 



                                         Starting on 21 at 2309, May 



                                         repeat 2.5 mg dose x 1 after 30 minutes

 



                                         if first dose ineffective. Do not excee

d 



                                         a total dose of 40 mg within a 24 hour 



                                         period. Rate of administration should 



                                         not exceed 5 mg/minute. 

 

  

 

  



                                         prochlorperazine (COMPAZINE) injection 



                                         2.5-5 mg 



                                         2.5-5 mg, Intramuscular, Every 4 hours 



                                         PRN, nausea/vomiting (1st line), 



                                         Starting on 21 at 2309, 



                                         Administer if patient does not have IV 



                                         access. May repeat 2.5 mg dose x 1 afte

r 



                                         60 minutes if first dose ineffective. D

o 



                                         not exceed a total dose of 40 mg within

 



                                         a 24 hour period. 

 

  

 

  



                                         prochlorperazine (COMPAZINE) suppositor

y 



                                         25 mg 



                                         25 mg, Rectal, Every 12 hours PRN, 



                                         nausea/vomiting (1st line), Starting on

 



                                         21 at 2309, Administer if 



                                         patient does not have IV access and 



                                         refuses IM injection. 

 

  

 

                2021  8:38 AM CST 2 tablets                        



                           senna-docusate (PERICOLACE) 8.6-50 mg 2  Given    



                                         tablet     



                                         2 tablet, Oral, 2 times daily, First   

  



                                         dose (after last modification) on Sat  

   



                                         21 at 0900     

 

                    2 tablets                                



                           Given                     2021   



                                         10:16 PM CST   

 

                    2 tablets                                



                           Given                     2021   



                                         8:19 AM CST   

 

  

 

  



                                         sodium bicarbonate tablet 325 mg 



                                         325 mg, Per NG tube, As needed, for 



                                         clogged feeding tube, Starting on 21 at 1126, Crush 1 Viokace table

t 



                                         and HALF sodium bicarbonate tablet and 



                                         mix with 5 mL tepid bottled water. 



                                         Instill mixture into the clogged tube 



                                         and leave in tube to dwell for a minimu

m 



                                         of 10 minutes (30 minutes max). Gently 



                                         pull back and forth on the syringe 



                                         plunger to loosen the clog. May repeat 



                                         this one time. Flush with 30 mL water 



                                         once tube is cleared. 

 

  

 

                2021  8:28 AM  mg          100 mL/hr        



                           thiamine (B-1) 250 mg in sodium chloride  New Bag    



                                         0.9 % (NS) 50 mL IVPB     



                                         250 mg, Intravenous, at 100 mL/hr, 3   

  



                                         times daily, First dose on Sun 21

     



                                         at 2100, For 5 days     

 

                    250 mg              100 mL/hr            



                           New Bag                   2021   



                                         10:30 PM CST   

 

  

 

                2021  8:26 AM  mg          250 mL/hr        



                           vancomycin (VANCOCIN) 750 mg in sodium  New Bag    



                                         chloride 0.9 % (NS) 250 mL IVPB     



                                         750 mg, Intravenous, at 250 mL/hr, Ever

y     



                                         12 hours, Indications: MENINGITIS, Firs

t     



                                         dose (after last modification) on Sat  

   



                                         21 at 2100, **REFRIGERATE**     

 

                    750 mg              250 mL/hr            



                           New Bag                   2021   



                                         10:58 PM CST   

 

                    750 mg              250 mL/hr            



                           New Bag                   2021   



                                         8:18 AM CST   

 

  

 

 







                Action Date     Dose            Rate            Site



                           Medication Order          MAR Action    

 

                2021  9:39 AM CST 2 g                              



                           cefTRIAXone (ROCEPHIN) injection 2 g  Given    



                                         2 g, Intravenous, Daily, Indications:  

   



                                         COPD, First dose on Thu 21 at    

 



                                         1000, If giving IV push, reconstitute  

   



                                         each vial with 20 ml sterile water and 

    



                                         give over 3-5 minutes If sterile water 

    



                                         is unavailable, may use Bacteriostatic 

    



                                         Water or Normal Saline for     



                                         reconstitution     

 

  

 

                2021  9:00 AM CST 0.3 mcg/kg/hr   5.48 mL/hr       



                           dexmedeTOMIDINE (PRECEDEX) 4 mcg/mL  Rate/Dose    



                           infusion 100 mL (premix)  Change    



                                         0.2-1.4 mcg/kg/hr      



                                         73.1 kg (3.655-25.585 mL/hr, rounded to

     



                                         3.66-25.59 mL/hr), Intravenous,     



                                         Continuous, Starting on Thu 21 at

     



                                         0945, Begin infusion at 0.2 mcg/kg/hr  

   



                                         and titrate by greater of 0.1 mcg/kg/hr

     



                                         or 25% increments every 10 minutes for 

    



                                         RASS +2. Infusion not to exceed 1     



                                         mcg/kg/hr.     

 

                    0.5 mcg/kg/hr       9.13 mL/hr           



                           Rate/Dose Change          2021   



                                         8:06 AM CST   

 

                    0.6 mcg/kg/hr       10.95 mL/hr          



                           Rate/Dose Change          2021   



                                         7:04 AM CST   

 

  

 

  



                                         fentaNYL (SUBLIMAZE) 50 mcg/mL injectio

n 



                                         Starting on u 21 at 0859, For 1

 



                                         dose, Angie Boles: cabinet override 

 

  

 

                2021 11:00 PM CST 25 mcg                           



                           fentaNYL (SUBLIMAZE) 50 mcg/mL injection  Given    



                                         Starting on 21 at 0017, For 1

     



                                         dose, Zara Alonso     



                                         override     

 

  

 

                2021 10:13 AM CST 25 mcg                           



                           fentaNYL (SUBLIMAZE) injection 25 mcg  Given    



                                         25 mcg, Intravenous, Once, On Thu     



                                         21 at 0915, For 1 dose, Administe

r     



                                         over 2 minutes; max dose for IVP is 2  

   



                                         mcg/kg. Note: Limit does not apply to  

   



                                         patients who may be tolerant to opioid 

    



                                         therapy or on continuous IV or PO opiat

e     



                                         therapy.     

 

  

 

                2021  3:15 PM CST 25 mcg                           



                           fentaNYL (SUBLIMAZE) injection 25 mcg  Given    



                                         25 mcg, Intravenous, Once, On Thu     



                                         21 at 1515, For 1 dose, Administe

r     



                                         over 2 minutes; max dose for IVP is 2  

   



                                         mcg/kg. Note: Limit does not apply to  

   



                                         patients who may be tolerant to opioid 

    



                                         therapy or on continuous IV or PO opiat

e     



                                         therapy.     

 

  

 

                2021  3:30 PM CST 25 mcg                           



                           fentaNYL (SUBLIMAZE) injection 25 mcg  Given    



                                         25 mcg, Intravenous, Once, On Thu     



                                         21 at 1530, For 1 dose, Administe

r     



                                         over 2 minutes; max dose for IVP is 2  

   



                                         mcg/kg. Note: Limit does not apply to  

   



                                         patients who may be tolerant to opioid 

    



                                         therapy or on continuous IV or PO opiat

e     



                                         therapy.     

 

  

 

                2021 12:18 AM CST 25 mcg                           



                           fentaNYL (SUBLIMAZE) injection 25 mcg  Given    



                                         25 mcg, Intravenous, Once, On 21 at 0045, For 1 dose, Administe

r     



                                         over 2 minutes; max dose for IVP is 2  

   



                                         mcg/kg. Note: Limit does not apply to  

   



                                         patients who may be tolerant to opioid 

    



                                         therapy or on continuous IV or PO opiat

e     



                                         therapy.     

 

  

 

                2021 10:58 PM CST 25 mcg                           



                           fentaNYL (SUBLIMAZE) injection 25 mcg  Given    



                                         25 mcg, Intravenous, Once, On 21 at 2315, For 1 dose, Administe

r     



                                         over 2 minutes; max dose for IVP is 2  

   



                                         mcg/kg. Note: Limit does not apply to  

   



                                         patients who may be tolerant to opioid 

    



                                         therapy or on continuous IV or PO opiat

e     



                                         therapy.     

 

  

 

                2021  4:30 PM CST 50 mcg                           



                           fentaNYL (SUBLIMAZE) injection 50 mcg  Given    



                                         50 mcg, Intravenous, Once, On Thu     



                                         21 at 1715, For 1 dose, Administe

r     



                                         over 2 minutes; max dose for IVP is 2  

   



                                         mcg/kg. Note: Limit does not apply to  

   



                                         patients who may be tolerant to opioid 

    



                                         therapy or on continuous IV or PO opiat

e     



                                         therapy.     

 

  

 

                2021  9:56 AM CST 20 mg                            



                           furosemide (LASIX) injection 20 mg  Given    



                                         20 mg, Intravenous, Once, On Sun     



                                         11/21/21 at 0915, For 1 dose     

 

  

 

                2021  8:38 PM CST 1 mg                             



                           guanFACINE (TENEX) tablet 1 mg  Given    



                                         1 mg, Per NG tube, 2 times daily, First

     



                                         dose on 21 at 2100     

 

  

 

  



                                         haloperidol lactate (HALDOL) 5 mg/mL 



                                         injection 



                                         Starting on Thu 21 at 1450, For 1

 



                                         dose, Sarah Lopez: cabinet 



                                         override 

 

  

 

                2021  1:31 AM CST 2.5 mg                           



                           haloperidol lactate (HALDOL) injection  Given    



                                         2.5 mg     



                                         2.5 mg, Intravenous, Once, On 21 at 0115, For 1 dose     

 

  

 

                2021  2:51 PM CST 5 mg                             



                           haloperidol lactate (HALDOL) injection 5  Given    



                                         mg     



                                         5 mg, Intravenous, Once, On u 

1     



                                         at 1515, For 1 dose     

 

  

 

                2021  6:04 PM CST 50 mL/hr        50 mL/hr         



                           lactated ringers infusion  New Bag    



                                         50 mL/hr, Intravenous, Continuous,     



                                         Starting on 21 at 0900, For 4

8     



                                         hours     

 

                    50 mL/hr            50 mL/hr             



                           Rate/Dose Change          2021   



                                         6:01 PM CST   

 

                    100 mL/hr           100 mL/hr            



                           New Bag                   2021   



                                         7:22 AM CST   

 

  

 

                2021  4:55 PM CST 5 mL                            Back



                           lidocaine (pf) (XYLOCAINE-MPF) 10 mg/mL  Given    



                                         (1 %) injection     



                                         Code/trauma/sedation medication,     



                                         Starting on Thu 21 at 1655     

 

  

 

                2021  8:47 AM CST 40 mg                            



                           methylPREDNISolone sod suc(PF)  Given    



                                         (SOLU-Medrol) injection 40 mg     



                                         40 mg, Intravenous, Every 8 hours     



                                         scheduled, First dose on Thu 21 a

t     



                                         0115     

 

                    40 mg                                    



                           Given                     2021   



                                         1:17 AM CST   

 

                    40 mg                                    



                           Given                     2021   



                                         6:18 PM CST   

 

  

 

  



                                         midazolam (PF) (VERSED) 1 mg/mL 



                                         injection 



                                         Starting on Thu 21 at 1504, For 1

 



                                         dose, Angie Boles: cabinet override 

 

  

 

                2021  4:30 PM CST 1 mg                             



                           midazolam (PF) (VERSED) injection 1 mg  Given    



                                         1 mg, Intravenous, Once, On Thu 

1     



                                         at 1530, For 1 dose, Each dose titrated

     



                                         slowly over at least 2 minutes for dose

s     



                                         less than or equal to 2.5 mg.     

 

                    1 mg                                     



                           Given                     2021   



                                         3:00 PM CST   

 

  

 

                2021  3:34 PM CST 1 mg                             



                           midazolam (PF) (VERSED) injection 1 mg  Given    



                                         1 mg, Intravenous, Once, On u      



                                         at 1715, For 1 dose, Each dose titrated

     



                                         slowly over at least 2 minutes for dose

s     



                                         less than or equal to 2.5 mg.     

 

  

 

                2021  1:30 AM CST 10 mg           51 mL/hr         



                           phytonadione (vitamin K1)  New Bag    



                                         (AQUA-MEPHYTON) 10 mg in dextrose (D5W)

     



                                         5 % 50 mL IVPB     



                                         10 mg, Intravenous, Administer over 60 

    



                                         Minutes, Once, On Thu 21 at 0115,

     



                                         For 1 dose, **REFRIGERATE**  PROTECT   

  



                                         FROM LIGHT     

 

  

 

                2021  9:58 AM CST 10 mg           51 mL/hr         



                           phytonadione (vitamin K1)  New Bag    



                                         (AQUA-MEPHYTON) 10 mg in dextrose (D5W)

     



                                         5 % 50 mL IVPB     



                                         10 mg, Intravenous, Administer over 60 

    



                                         Minutes, Once, On 21 at 0900,

     



                                         For 1 dose, **REFRIGERATE**  PROTECT   

  



                                         FROM LIGHT     

 

  

 

                2021  8:38 PM CST 1 tablet                         



                           senna-docusate (PERICOLACE) 8.6-50 mg 1  Given    



                                         tablet     



                                         1 tablet, Oral, 2 times daily, First   

  



                                         dose on Thu 21 at 0100     

 

                    1 tablet                                 



                           Given                     2021   



                                         8:40 AM CST   

 

                    1 tablet                                 



                           Given                     2021   



                                         8:07 PM CST   

 

  

 

                2021 10:04 PM  mL          999 mL/hr        



                           sodium chloride 0.9 % (NS) infusion  New Bag    



                                         Starting on Thu 21 at 2133, For 1

     



                                         dose, Janay Augustine: cabinet override   

  

 

  

 

                2021  3:16 AM  mL/hr       100 mL/hr        



                           sodium chloride 0.9% infusion  New Bag    



                                         100 mL/hr, Intravenous, Continuous,    

 



                                         Starting on 21 at 2315, For 4

8     



                                         hours     

 

                    100 mL/hr           100 mL/hr            



                           New Bag                   2021   



                                         3:27 PM CST   

 

                    100 mL/hr           100 mL/hr            



                           New Bag                   2021   



                                         1:11 AM CST   

 

  

 

                2021  4:45 PM  mg          100 mL/hr        



                           thiamine (B-1) 500 mg in sodium chloride  New Bag    



                                         0.9 % (NS) 50 mL IVPB     



                                         500 mg, Intravenous, at 100 mL/hr, 3   

  



                                         times daily, First dose on Sat 21

     



                                         at 1600, For 1 dose, Give over 5     



                                         minutes.  If unable to tolerate PO, may

     



                                         continue IV doses x 3 days.  **Give    

 



                                         first dose prior to dextrose infusion t

o     



                                         prevent Wernicke's Encephalopathy.**   

  

 

  

 

                2021  8:16 AM  mg          100 mL/hr        



                           thiamine (B-1) 500 mg in sodium chloride  New Bag    



                                         0.9 % (NS) 50 mL IVPB     



                                         500 mg, Intravenous, at 100 mL/hr, 3   

  



                                         times daily, First dose on Sun 21

     



                                         at 0000, For 2 doses, Give over 5     



                                         minutes.  If unable to tolerate PO, may

     



                                         continue IV doses x 3 days.  **Give    

 



                                         first dose prior to dextrose infusion t

o     



                                         prevent Wernicke's Encephalopathy.**   

  

 

                    500 mg              100 mL/hr            



                           New Bag                   2021   



                                         12:00 AM CST   

 

  

 

                2021 10:13 AM  mg                           



                           thiamine (B-1) injection 100 mg  Given    



                                         100 mg, Intravenous, Daily, First dose 

    



                                         on Sat 21 at 1015, For 1 dose,   

  



                                         Give over 5 minutes.  May continue IV  

   



                                         dosing x 3 days.  **Give first dose    

 



                                         prior to dextrose infusion to prevent  

   



                                         Wernicke's encephalopathy**     

 

  

 

                2021  8:47 AM CST 1,000 mg        250 mL/hr        



                           vancomycin (VANCOCIN) 1,000 mg in sodium  New Bag    



                                         chloride 0.9 % (NS) 250 mL IVPB     



                                         1,000 mg, Intravenous, at 250 mL/hr,   

  



                                         Every 12 hours, Indications: MENINGITIS

,     



                                         First dose on 21 at 0900,    

 



                                         **REFRIGERATE**     

 

                    1,000 mg            250 mL/hr            



                           New Bag                   2021   



                                         8:30 PM CST   

 

                    1,000 mg            250 mL/hr            



                           New Bag                   2021   



                                         9:52 AM CST   

 

  

 

                2021  9:23 PM CST 1,500 mg        250 mL/hr        



                           vancomycin (VANCOCIN) 1,500 mg in sodium  New Bag    



                                         chloride 0.9 % (NS) 500 mL IVPB     



                                         1,500 mg, Intravenous, at 250 mL/hr,   

  



                                         Once, Indications: MENINGITIS, On Thu  

   



                                         21 at 2100, For 1 dose,     



                                         **REFRIGERATE**     

 

  



documented in this encounter



Active and Recently Administered Medications

Times are shown in CST.



                          2021



                           Medication Order          2021  

 

                                        0907 (New Bag - Provider: Dequan bahena RN)2251 (New Bag - Provider: 

Kavita Esqueda, RN)2352 (Stopped - Provider: Kavita Esqueda, LISETH)

                                        0842 (New Bag - Provider: Faviola khan RN)2100 (Due)





                           acyclovir (ZOVIRAX) 640 mg in dextrose  0956 (New Bag

 -  



                           (D5W) 5 % 100 mL IVPB     Provider: Radha  



                           640 mg (rounded from 638 mg = 10 mg/kg   YANY Zuleta)1101  



                           63.8 kg Ideal weight), Intravenous, at  (Paused - Pro

vider:  



                           112.8 mL/hr, Every 12 hours scheduled,  Radha jaramillo,  



                           Indications: CNS INFECTION, First dose  RN)2126 (New 

Bag -  



                           on Thu 21 at 2100   Provider: Nicole Lynch, LISETH)  

 

                                        0023 (New Bag - Provider: Nicole Lynch,

 RN)0601 (New Bag - Provider: Nicole Lynch, RN)0638 (Stopped - Provider: Dequan Amaya, RN)1159 (New Bag - 
Provider: Dequan Amaya, RN)1240 (Stopped - Provider: Dequan Amaya, RN)
1757 (New Bag - Provider: Dequan Amaya RN)2212 (New Bag - Provider: 
Kavita Esqueda, RN)2213 (Rate/Dose Verify - Provider: Kavita Esqueda 
RN)2256 (Stopped - Provider: Kavita Esqueda RN)

                                        0000 (Not Given - Provider: Kavita max RN - Reason: Given 

previously)0521 (New Bag - Provider: Kavita Esqueda, LISETH)1200 (Due)1800 
(Due)





                           ampicillin (OMNIPEN) 2 g in sodium  0503 (New Bag -  



                           chloride ADDV (NS) 100 mL IVPB  Provider: Gila MATA  



                           2 g, Intravenous, at 150 mL/hr, Every 6  LISETH Mcdowell

)0550  



                           hours scheduled, Indications: CNS  (Stopped - Provide

r:  



                           INFECTION, First dose on Thu 21 at  Gila crockett,  



                           2130                      RN)1233 (New Bag -  



                                         Provider: Radha Zuleta RN)1320  



                                         (Stopped - Provider:  



                                         Radha Zuleta RN)1718 (New Bag -  



                                         Provider: Radha Zuleta RN)1801  



                                         (Stopped - Provider:  



                                         Radha Zuleta RN)  

 

                                        0952 (Given - Provider: Dequan Amaya RN)

                                        0839 (Given - Provider: Faviola small RN)





                           bisacodyL (DULCOLAX) suppository 10 mg  1050 (Given -

  



                           10 mg, Rectal, Daily, First dose on Sat  Provider: Florentin schneider  



                           21 at 0930          [place])  

 

                                        0802 (New Bag - Provider: Dequan bahena RN)0834 (Stopped - Provider: 

Dequan Amaya RN)2216 (New Bag - Provider: Kavita Esqueda, RN)2217 
(Stopped - Provider: Kavita Esqueda, LISETH)

                                        0832 (New Bag - Provider: Faviola khan RN)2100 (Due)





                           cefepime (MAXIPIME) 2 g in sodium  0838 (New Bag -  



                           chloride ADDV (NS) 50 mL IVPB  Provider: Radha  



                           2 g, Intravenous, at 100 mL/hr, Every 12  YANY Zuleta)2123  



                           hours scheduled, Indications: CNS  (New Bag - Provide

r:  



                           INFECTION, First dose on Thu 21 at  Nicole craft RN)  



                                            

 

                                        1159 (Given - Provider: Dequan Amaya RN)2217 (Given - Provider: Kavita Esqueda, RN)

                                        1115 (Due)2315 (Due)





                           famotidine (PEPCID) injection 20  1025 (Given -  



                           mg(Linked Group 1)        Provider: Karmen  



                           20 mg, Intravenous, Every 12 hours,  [place])2354 (Gi

anjali  



                           First dose on 21 at 2315,  - Provider: Melisa vega  



                           Pharmacy to adjust dosing for renal  LISETH Lynch)  



                                         insufficiency.   

 

                                        1159 (See Alternative - Provider: Dequan Amaya RN)2217 (See Alternative -

 Provider: Kavita Esqueda RN)

                                        111 (Due)2315 (Due)





                           famotidine (PEPCID) tablet 20 mg(Linked  1025 (See  



                           Group 1)                  Alternative -  



                           20 mg, Oral, Every 12 hours, First dose  Provider: Florentin schneider  



                           on 21 at 2315, Pharmacy to  [place])2354 (S

ee  



                           adjust dosing for renal insufficiency.  Alternative -

  



                                         Provider: Nicole Lynch RN)  

 

                                        0956 (Given - Provider: Dequan Amaya RN)

  



                                         furosemide (LASIX) injection 20 mg   



                                         (COMPLETED)   



                                         20 mg, Intravenous, Once, On Sun   



                                         21 at 0915, For 1 dose   

 

                                        1003 (Given - Provider: Dequan Amaya RN)2226 (Given - Provider: Kavita Esqueda RN)

                                        0838 (Given - Provider: Faviola small RN)2100 (Due)





                           guanFACINE (TENEX) tablet 2 mg  0954 (Given -  



                           2 mg, Per NG tube, 2 times daily, First  Provider: Mirna

vera  



                           dose (after last modification) on Derek Zuleta RN -

  



                           21 at 0900          Comment: per NG  



                                         tube)2132 (Given -  



                                         Provider: Nicole Lynch RN)  

 

                                        0945 (Due)1400 (Due)2200 (Due)





                                         heparin (porcine) 5,000 unit/mL   



                                         injection 5,000 Units   



                                         5,000 Units, Subcutaneous, Every 8   



                                         hours, First dose on Mon 21 at   



                                         0945   

 

                                        0000 (Not Given - Provider: Nicole dyer RN - Reason: Order parameters not 

met)0600 (Not Given - Provider: Nicole Lynch RN - Reason: Order parameters not
 met)1200 (Not Given - Provider: Dequan Amaya RN - Reason: Order parameters
 not met)1800 (Not Given - Provider: Dequan Amaya RN - Reason: Order 
parameters not met)

                                        0000 (Not Given - Provider: Kavita max RN - Reason: Order parameters 

not met)0613 (Not Given - Provider: Kavita Esqueda RN - Reason: Order 
parameters not met)1200 (Due)1800 (Due)





                           insulin lispro (HumaLOG) injection 2-7  0843 (Given -

  



                           Units                     Provider: Radha  



                           2-7 Units, Subcutaneous, Every 6 hours  LISETH Zlueta)

1223  



                           scheduled, First dose on Sat 21 at  (Given - Pr

ovider:  



                           0830, LEVEL 3 - Give in addition to  Radha Zuleta, 

 



                           scheduled mealtime insulin per table  DO  RN)1800 (No

t Given -  



                           NOT GIVE for any 2 hours post meal  Provider: Radha  



                           fingerstick blood glucose checks. If pt  LISETH Zuleta

 -  



                           NPO or on continuous enteral/parenteral  Reason: Orde

r  



                           nutrition - give with scheduled  parameters not met) 

 



                                         fingerstick blood glucose check - dose 

  



                                         per table  Glucose (mg/dL)         Dose

   



                                         0-120                       0 units   



                                         121-150                       0 units  

 



                                         151-200                       2 units  

 



                                         201-250                       3 units  

 



                                         251-300                       4 units  

 



                                         301-350                       5 units  

 



                                         351-400                       6 units  

 



                                         >400                       7 units   



                                         Bedtime Admin Instructions: If glucose 

  



                                         level is less than 200, do not give any

   



                                         correction dose If glucose level is 200

   



                                         or greater give correction dose   



                                         according to sliding scale (see below) 

  



                                         and check BG @ 0000 and 0300  LEVEL 3 -

   



                                         Bedtime Only    Glucose (mg/dL)   



                                         Dose       0-199                       

0   



                                         units 200-250                       3  

 



                                         units 251-300                       4  

 



                                         units  301-350                       5 

  



                                         units  351-400                       6 

  



                                         units      >400                       7

   



                                         units   

 

                                        0822 (Given - Provider: Sal Head, RR

T)1212 (Given - Provider: Sal Head, RRT)1705 (Given - Provider: Sal Head RRT)1953 (Given - Provider: 
Jonathan Reyes, OMAYRA)

                                        0824 (Given - Provider: YANY Ventura

RT)1200 (Due)1600 (Due)2000 (Due)





                           ipratropium-albuteroL (DUO-NEB) 0.5-3  0746 (Given - 

 



                           mg/3 mL nebulizer solution 3 mL  Provider: Sarai MUELLER  



                           3 mL, Inhalation, 4 times daily, First  [place], OMAYRA)11

32  



                           dose on Thu 21 at 0800  (Given - Provider:  



                                         Sarai Kat,  



                                         RRT)1540 (Given -  



                                         Provider: Sarai Kat, RRT)2004  



                                         (Given - Provider:  



                                         Jonathan Reyes,  



                                         OMAYRA)  

 

                                        0819 (Given - Provider: Dequan Amaya RN)

                                        0838 (Given - Provider: Faviola small RN)





                           multivitamin (THERAGRAN) tablet  1627 (Given -  



                           1 tablet, Oral, Daily, First dose on Sat  Provider: ERVIN mendez  



                           21 at 1545          LISETH Zuleta -  



                                         Comment: per NG  



                                         tube)  

 

                                        0819 (Given - Provider: Dequan Amaya RN)

                                        0851 (Given - Provider: Faviola small RN)





                                         polyethylene glycol (GLYCOLAX) packet 1

7   



                                         g   



                                         17 g, Oral, Daily, First dose on Sun   



                                         21 at 0900   

 

                                        0819 (Given - Provider: Dequan Amaya RN)2216 (Given - Provider: Kavita Esqueda RN)

                                        0838 (Given - Provider: Faviola small RN)2100 (Due)





                           senna-docusate (PERICOLACE) 8.6-50 mg 2  0954 (Given 

-  



                           tablet                    Provider: Radha  



                           2 tablet, Oral, 2 times daily, First  LISETH Zuleta - 

 



                           dose (after last modification) on Sat  Comment: per N

G  



                           21 at 0900          tube)2132 (Given -  



                                         Provider: Nicole Lynch RN -  



                                         Comment: per NG)  

 

                                        2230 (New Bag - Provider: Kavita franco RN)

                                        0828 (New Bag - Provider: Faviola khan RN)1600 (Due)2100 (Due)





                                         thiamine (B-1) 250 mg in sodium chlorid

e   



                                         0.9 % (NS) 50 mL IVPB   



                                         250 mg, Intravenous, at 100 mL/hr, 3   



                                         times daily, First dose on Sun 21

   



                                         at 2100, For 5 days   

 

     



                           thiamine (B-1) 500 mg in sodium chloride  1645 (New B

ag -  



                           0.9 % (NS) 50 mL IVPB (COMPLETED)  Provider: Radha  



                           500 mg, Intravenous, at 100 mL/hr, 3  LISETH Zuleta)17

10  



                           times daily, First dose on Sat 21  (Paused - Pr

ovider:  



                           at 1600, For 1 dose, Give over 5  Radha Zuleta RN)

  



                                         minutes.  If unable to tolerate PO, may

   



                                         continue IV doses x 3 days.  **Give   



                                         first dose prior to dextrose infusion t

o   



                                         prevent Wernicke's Encephalopathy.**   

 

                                        0000 (New Bag - Provider: Nicole Lynch RN)0816 (New Bag - Provider: Dequan Amaya, RN)0838 (Stopped - Provider: Dequan Amaya, LISETH)

  



                                         thiamine (B-1) 500 mg in sodium chlorid

e   



                                         0.9 % (NS) 50 mL IVPB (COMPLETED)   



                                         500 mg, Intravenous, at 100 mL/hr, 3   



                                         times daily, First dose on Sun 11/21/21

   



                                         at 0000, For 2 doses, Give over 5   



                                         minutes.  If unable to tolerate PO, may

   



                                         continue IV doses x 3 days.  **Give   



                                         first dose prior to dextrose infusion t

o   



                                         prevent Wernicke's Encephalopathy.**   

 

     



                           thiamine (B-1) injection 100 mg  1013 (Given -  



                           (COMPLETED)               Provider: Karmen  



                           100 mg, Intravenous, Daily, First dose  [place])  



                                         on Sat 21 at 1015, For 1 dose,   



                                         Give over 5 minutes.  May continue IV  

 



                                         dosing x 3 days.  **Give first dose   



                                         prior to dextrose infusion to prevent  

 



                                         Wernicke's encephalopathy**   

 

     



                           vancomycin (VANCOCIN) 1,000 mg in sodium  0847 (New B

ag -  



                           chloride 0.9 % (NS) 250 mL IVPB  Provider: Radha  



                           (CANCELED)                LISETH Zuleta)1002  



                           1,000 mg, Intravenous, at 250 mL/hr,  (Stopped - Prov

ider:  



                           Every 12 hours, Indications: MENINGITIS,  Karmen ADOLFO

guille)  



                                         First dose on 21 at 0900,   



                                         **REFRIGERATE**   

 

                                        0818 (New Bag - Provider: Dequan bahena RN)2258 (New Bag - Provider: 

Kavita Esqueda, LISETH)

                                        0001 (Stopped - Provider: Kavita franco, RN)0826 (New Bag - Provider: 

Faviola Harrington RN)2100 (Due)





                           vancomycin (VANCOCIN) 750 mg in sodium  2132 (New Bag

 -  



                           chloride 0.9 % (NS) 250 mL IVPB  Provider: Nicole  



                           750 mg, Intravenous, at 250 mL/hr, Every  LISETH Lynch

)  



                                         12 hours, Indications: MENINGITIS, Firs

t   



                                         dose (after last modification) on Sat  

 



                                         21 at 2100, **REFRIGERATE**   







                          2021



                           Medication Order          2021  

 

                                        0028 (Rate/Dose Change - Provider: Nicole Lynch RN)0251 (New Bag - Provider:

 Nicole Lynch RN)0704 (Rate/Dose Change - Provider: Dequan Amaya, LISETH)0806 
(Rate/Dose Change - Provider: Dequan Amaya, RN)0900 (Rate/Dose Change - 
Provider: Dequan Amaya, RN)0919 (Stopped - Provider: Dequan Amaya RN)

  



                           dexmedeTOMIDINE (PRECEDEX) 4 mcg/mL  0030 (New Bag - 

 



                           infusion 100 mL (premix) (CANCELED)  Provider: Jenifer MATA  



                           0.2-1.4 mcg/kg/hr        LISETH Mcdowell)0639  



                           73.1 kg (3.655-25.585 mL/hr, rounded to  (New Bag - P

rovider:  



                           3.66-25.59 mL/hr), Intravenous,  Gila Mcdowell, 

 



                           Continuous, Starting on Thu 21 at  RN)0805 (Rat

e/Dose  



                           0945, Begin infusion at 0.2 mcg/kg/hr  Change - Provi

aym:  



                           and titrate by greater of 0.1 mcg/kg/hr  Radha duncan,  



                           or 25% increments every 10 minutes for  RN)0917 (Rate

/Dose  



                           RASS +2. Infusion not to exceed 1  Change - Provider:

  



                           mcg/kg/hr.                Karmen Duarte)0957  



                                         (Rate/Dose Change -  



                                         Provider: Karmen Duarte)1012  



                                         (Rate/Dose Change -  



                                         Provider: Karmen Duarte)1018 (Paused  



                                         - Provider:  



                                         Karmen Duarte)1214 (New  



                                         Bag - Provider:  



                                         Radha Zuleta RN)1215 (Rate/Dose  



                                         Change - Provider:  



                                         Radha Zuleta RN)1253 (Rate/Dose  



                                         Change - Provider:  



                                         Radha Zuleta,  



                                         RN)1421 (Rate/Dose  



                                         Change - Provider:  



                                         Radha Zuleta RN)1548 (Rate/Dose  



                                         Change - Provider:  



                                         Radha Zuleta RN)1608 (Rate/Dose  



                                         Change - Provider:  



                                         Radha Zuleta RN)1615 (Rate/Dose  



                                         Change - Provider:  



                                         Radha Zuleta  



                                         RN)1617 (Rate/Dose  



                                         Change - Provider:  



                                         Radha Zuleta  



                                         RN)1639 (Rate/Dose  



                                         Change - Provider:  



                                         Radha Zuleta RN)1710 (New Bag -  



                                         Provider: Radha Zuleta RN)1710  



                                         (Rate/Dose Change -  



                                         Provider: Radha Zuleta RN)1801  



                                         (Rate/Dose Change -  



                                         Provider: Radha Zuleta RN)1951  



                                         (Rate/Dose Change -  



                                         Provider: Nicole Lynch RN)  



                                         (Rate/Dose Change -  



                                         Provider: Nicole Lynch RN)2150  



                                         (Rate/Dose Change -  



                                         Provider: Nicole Lynch RN)2205  



                                         (New Bag - Provider:  



                                         Nicole Lynch RN)  

 

                                        0829 (Stopped - Provider: Dequan bahena RN)

  



                           lactated ringers infusion (CANCELED)  0722 (New Bag -

  



                           50 mL/hr, Intravenous, Continuous,  Provider: Shaun dawn  



                           Starting on 21 at 0900, For 48  [place])180

1  



                           hours                     (Rate/Dose Change -  



                                         Provider: Radha Zuleta RN)1804  



                                         (New Bag - Provider:  



                                         Radha Zuleta RN)  







                          2021



                           Medication Order          2021  

 

     



                                         alteplase (CATHFLO ACTIVASE) injection 

1   



                                         mg   



                                         1 mg, Intra-Catheter, As needed,   



                                         declotting central catheter or   



                                         sluggish/occluded CVC line, Starting on

   



                                         21 at 0939, Use 1 mg/mL to   



                                         declot catheter as needed, Declot   



                                         catheter per Central Venous Access   



                                         Device, Declotting procedure in   



                                         [place] **REFRIGERATE**   

 

     



                                         dextrose 50% (D50W) syringe 25-50 mL   



                                         25-50 mL, Intravenous, As needed, low  

 



                                         blood sugar, Starting on Thu 21 a

t   



                                         0115, Give if patient NPO and IV access

   



                                         already available. If no IV access give

   



                                         Glucagon SQ or IM in arm and turn   



                                         patient on side.  For blood glucose   



                                         (BG):   Less than 50 mg/dL: Give D50W 5

0   



                                         mL. Check BG every 15 minutes and repea

t   



                                         until greater than 80 mg/dL.  Less than

   



                                         70 mg/dL: Give D50W 25 mL. Check BG   



                                         every 15 minutes and repeat until   



                                         greater than 80 mg/dL.  Less than 70   



                                         mg/dL and patient unconscious: Give D50

W   



                                         50 mL. Call physician for additional   



                                         orders. Check BG every 15 minutes and  

 



                                         repeat until greater than 80 mg/dL.   



                                         Once blood glucose greater than 80   



                                         mg/dL, check BG in one hour. Call   



                                         physician if less than 70 mg/dL.   

 

     



                                         flumazeniL (ROMAZICON) injection 0.2 mg

   



                                         0.2 mg, Intravenous, As needed, clinica

l   



                                         evidence of impending respiratory   



                                         failure, difficult to arouse, etc.,   



                                         Starting on Sat 21 at 0948, For 4

   



                                         doses, Call physician immediately if   



                                         administered.  May repeat 0.2 mg IV   



                                         every 60 seconds for a maximum of 4   



                                         doses.   

 

     



                                         glucagon (GLUCAGEN) injection 1   



                                         mg(Linked Group 2)   



                                         1 mg, Intramuscular, As needed, low   



                                         blood sugar, low blood sugar, Starting 

  



                                         on Thu 21 at 0115, Give if patien

t   



                                         NPO and no IV access. May give IM or SQ

   



                                         in arm and turn patient on side.   



                                         Reconstitute powder for injection by   



                                         adding 1 mL of -supplied   



                                         sterile diluent or sterile water for   



                                         injection to a vial containing 1 unit o

f   



                                         the drug, to provide solutions   



                                         containing 1 mg of glucagon/mL. Shake  

 



                                         vial gently to dissolve.   

 

     



                                         glucagon (GLUCAGEN) injection 1   



                                         mg(Linked Group 2)   



                                         1 mg, Subcutaneous, As needed, low bloo

d   



                                         sugar, low blood sugar, Starting on Thu

   



                                         21 at 0115, Give if patient NPO  

 



                                         and no IV access. May give IM or SQ in 

  



                                         arm and turn patient on side.   



                                         Reconstitute powder for injection by   



                                         adding 1 mL of -supplied   



                                         sterile diluent or sterile water for   



                                         injection to a vial containing 1 unit o

f   



                                         the drug, to provide solutions   



                                         containing 1 mg of glucagon/mL. Shake  

 



                                         vial gently to dissolve.   

 

     



                                         glucose chewable tablet 16-32 g   



                                         16-32 g, Oral, As needed, low blood   



                                         sugar, Starting on Thu 21 at 0115

,   



                                         Give food, drink, or glucose tablets to

   



                                         treat low blood glucose if patient able

   



                                         to eat.  For blood glucose (BG): Less  

 



                                         than 50 mg/dL: Give 30 g of carbohydrat

e   



                                         (32 g if using glucose tablets). Check 

  



                                         BG every 15 minutes and repeat until   



                                         greater than 80 mg/dL.  Less than 70   



                                         mg/dL: Give 15 g of carbohydrate (16 g 

  



                                         if using glucose tablets). Check BG   



                                         every 15 minutes and repeat until   



                                         greater than 80 mg/dL.  Less than 70   



                                         mg/dL and patient unconscious: BG to be

   



                                         treated with D50W until greater than 80

   



                                         mg/dL. Once BG greater than 80 mg/dL,  

 



                                         give 30 g of carbohydrate (32 g if usin

g   



                                         glucose tablets) if patient awake and  

 



                                         able to swallow.   Once blood glucose  

 



                                         greater than 80 mg/dL, check BG in one 

  



                                         hour. Call physician if less than 70   



                                         mg/dL.   

 

                                        0105 (Given - Provider: Kavita Zelaya ams, RN)





                                         hydrALAZINE (APRESOLINE) injection 10-2

0   



                                         mg   



                                         10-20 mg, Intravenous, Every 2 hours   



                                         PRN, high blood pressure, SBP > 160,   



                                         Starting on Thu 21 at 0033   

 

                                        0138 (Given - Provider: Kavita Zelaya ams, RN)





                                         labetaloL (NORMODYNE,TRANDATE) injectio

n   



                                         5-10 mg   



                                         5-10 mg, Intravenous, Every 4 hours PRN

,   



                                         SBP goal < 160, Starting on Mon 

1   



                                         at 0132   

 

     



                                         lipase-protease-amylase (VIOKACE)   



                                         10,440-39,150- 39,150 unit tablet 10,44

0   



                                         units of lipase(Linked Group 3)   



                                         10,440 units of lipase, Per NG tube, As

   



                                         needed, for clogged feeding tube,   



                                         Starting on 21 at 1126, Crush

   



                                         1 Viokace tablet and HALF sodium   



                                         bicarbonate tablet and mix with 5 mL   



                                         tepid bottled water.  Instill mixture  

 



                                         into the clogged tube and leave in tube

   



                                         to dwell for a minimum of 10 minutes (3

0   



                                         minutes max). Gently pull back and fort

h   



                                         on the syringe plunger to loosen the   



                                         clog. May repeat this one time. Flush  

 



                                         with 30 mL water once tube is cleared. 

  

 

                                        0410 (Given - Provider: YANY Alston)1430 (Given - Provider: Dequan Amaya RN)

                                        0009 (Given - Provider: Kavita Zelaya ams, RN)0132 (Given - Provider: 

Kavita Esqueda RN - Comment: minor LOPEZ)0305 (Given - Provider: 
Kavita Esqueda RN)0405 (Given - Provider: Kavita Esqueda RN)





                           LORazepam (ATIVAN) injection 1-2  162 (Given -  



                           mg(Linked Group 4)        Provider: Radha  



                           1-2 mg, Intravenous, As needed, Base on  LISETH Zuleta

)  



                           CIWA-Ar Score, Starting on Sat 21  (Given - Pro

vider:  



                           at 0948, Score 0 to 8 - Give NO dose and  Nicole craft RN)  



                                         Reassess CIWA-Ar Score, vital signs   



                                         every 4 hours Score 9 to 15 - Give 1 mg

   



                                         and Reassess CIWA-Ar Score, vital signs

   



                                         and re-administer medication if needed 

  



                                         every 1 hour after each dose then based

   



                                         on the CIWA Score greater than 15 - Giv

e   



                                         2 mg Reassess CIWA-Ar Score, vital sign

s   



                                         and re-administer medication if needed 

  



                                         every 1 hour  **Hold Any Dose For Exces

s   



                                         Sedation** **IF PATIENT RECEIVES MORE  

 



                                         THAN 24 MG IN 24 HOUR PERIOD, CALL   



                                         PHYSICIAN FOR FURTHER DOSING   



                                         INSTRUCTIONS/REVISIONS** Maximum IV pus

h   



                                         rate of 2 mg/min; max IVP dose is 4 mg.

   

 

                                        0410 (See Alternative - Provider: Nicole Lynch RN)1430 (See Alternative - 

Provider: Dequan Amaya RN)

                                        0009 (See Alternative - Provider: Chai Esqueda RN)0132 (See Alternative

 - Provider: Kavita Esqueda RN)0305 (See Alternative - Provider: Kavita Esqueda RN)0405 (See Alternative - Provider: Kavita Esqueda RN)





                           LORazepam (ATIVAN) tablet 1-2 mg(Linked  162 (See  



                           Group 4)                  Alternative -  



                           1-2 mg, Oral, As needed, Based on  Provider: Radha  



                           CIWA-Ar Score, Starting on Sat 21  LISETH Zuleta

)  



                           at 0948, Score 0 to 8 - Give NO dose and  (See Altern

ative -  



                           Reassess CIWA-Ar Score, vital signs  Provider: Nicole 

 



                           every 4 hours Score 9 to 15 - Give 1 mg  Lynch, RN)

  



                                         and Reassess CIWA-Ar Score, vital signs

   



                                         and re-administer medication if needed 

  



                                         every 1 hour after each dose then based

   



                                         on the CIWA Score greater than 15 - Giv

e   



                                         2 mg Reassess CIWA-Ar Score, vital sign

s   



                                         and re-administer medication if needed 

  



                                         every 1 hour  **Hold Any Dose For Exces

s   



                                         Sedation** **IF PATIENT RECEIVES MORE  

 



                                         THAN 24 MG IN 24 HOUR PERIOD, CALL   



                                         PHYSICIAN FOR FURTHER DOSING   



                                         INSTRUCTIONS/REVISIONS**   

 

     



                                         magnesium hydroxide (MILK OF MAGNESIA) 

  



                                         suspension 30 mL   



                                         30 mL, Oral, Daily PRN, constipation,  

 



                                         Starting on Sun 11/21/21 at 0643   

 

     



                                         ondansetron (ZOFRAN) injection 4 mg   



                                         4 mg, Intravenous, Every 6 hours PRN,  

 



                                         nausea/vomiting (2nd line), Starting on

   



                                         21 at 2309   

 

     



                                         prochlorperazine (COMPAZINE) injection 

  



                                         2.5-5 mg(Linked Group 5)   



                                         2.5-5 mg, Intravenous, Every 4 hours   



                                         PRN, nausea/vomiting (1st line),   



                                         Starting on 21 at 2309, May  

 



                                         repeat 2.5 mg dose x 1 after 30 minutes

   



                                         if first dose ineffective. Do not excee

d   



                                         a total dose of 40 mg within a 24 hour 

  



                                         period. Rate of administration should  

 



                                         not exceed 5 mg/minute.   

 

     



                                         prochlorperazine (COMPAZINE) injection 

  



                                         2.5-5 mg(Linked Group 5)   



                                         2.5-5 mg, Intramuscular, Every 4 hours 

  



                                         PRN, nausea/vomiting (1st line),   



                                         Starting on 21 at 2309,   



                                         Administer if patient does not have IV 

  



                                         access. May repeat 2.5 mg dose x 1 afte

r   



                                         60 minutes if first dose ineffective. D

o   



                                         not exceed a total dose of 40 mg within

   



                                         a 24 hour period.   

 

     



                                         prochlorperazine (COMPAZINE) suppositor

y   



                                         25 mg(Linked Group 5)   



                                         25 mg, Rectal, Every 12 hours PRN,   



                                         nausea/vomiting (1st line), Starting on

   



                                         21 at 2309, Administer if   



                                         patient does not have IV access and   



                                         refuses IM injection.   

 

     



                                         sodium bicarbonate tablet 325 mg(Linked

   



                                         Group 3)   



                                         325 mg, Per NG tube, As needed, for   



                                         clogged feeding tube, Starting on 21 at 1126, Crush 1 Viokace table

t   



                                         and HALF sodium bicarbonate tablet and 

  



                                         mix with 5 mL tepid bottled water.   



                                         Instill mixture into the clogged tube  

 



                                         and leave in tube to dwell for a minimu

m   



                                         of 10 minutes (30 minutes max). Gently 

  



                                         pull back and forth on the syringe   



                                         plunger to loosen the clog. May repeat 

  



                                         this one time. Flush with 30 mL water  

 



                                         once tube is cleared.   











                                         Order

 





                                         Group 1:



                                         famotidine (PEPCID) tablet 20 mgJump to

 med



                                         20 mg, Oral, Every 12 hours, First dose

 on 21 at 2315<br>Pharmacy to 

adjust dosing for



                                         renal insufficiency.<br>

 





                                         Or



                                         famotidine (PEPCID) injection 20 mgJump

 to med



                                         20 mg, Intravenous, Every 12 hours, Fir

st dose on 21 at 

2315<br>Pharmacy to adjust dosing



                                         for renal insufficiency.<br>

 





                                         Group 2:



                                         glucagon (GLUCAGEN) injection 1 mgJump 

to med



                                         1 mg, Intramuscular, As needed, low blo

od sugar, low blood sugar, Starting on 

21 at



                                         0115<br>Give if patient NPO and no IV a

ccess. May give IM or SQ in arm and turn

 patient on



                                         side.&nbsp;Reconstitute powder for inje

ction by adding 1 mL of -

supplied sterile diluent



                                         or sterile water for injection to a via

l containing 1 unit of the drug, to 

provide solutions



                                         containing 1 mg of glucagon/mL. Shake v

ial gently to dissolve.<br>

 





                                         Or



                                         glucagon (GLUCAGEN) injection 1 mgJump 

to med



                                         1 mg, Subcutaneous, As needed, low bloo

d sugar, low blood sugar, Starting on 

21 at



                                         0115<br>Give if patient NPO and no IV a

ccess. May give IM or SQ in arm and turn

 patient on



                                         side.&nbsp;Reconstitute powder for inje

ction by adding 1 mL of -

supplied sterile diluent



                                         or sterile water for injection to a via

l containing 1 unit of the drug, to 

provide solutions



                                         containing 1 mg of glucagon/mL. Shake v

ial gently to dissolve.<br>

 





                                         Group 3:



                                         lipase-protease-amylase (VIOKACE) 10,44

0-39,150- 39,150 unit tablet 10,440 

units of lipaseJump to



                                         med



                                         10,440 units of lipase, Per NG tube, As

 needed, for clogged feeding tube, 

Starting on 21



                                         at 1126<br>Crush 1 Viokace tablet and H

care home sodium bicarbonate tablet and mix 

with 5 mL tepid bottled



                                         water. &nbsp;Instill mixture into the c

logged tube and leave in tube to dwell 

for a minimum of 10



                                         minutes (30 minutes max).&nbsp;Gently p

ull back and forth on the syringe 

plunger to loosen the



                                         clog.&nbsp;May repeat this one time.&nb

sp;Flush with 30 mL water once tube is 

cleared.<br>

 





                                         And



                                         sodium bicarbonate tablet 325 mgJump to

 med



                                         325 mg, Per NG tube, As needed, for komal

gged feeding tube, Starting on 21 at 1126<br>Crush



                                         1 Viokace tablet and HALF sodium bicarb

milka tablet and mix with 5 mL tepid 

bottled water.



                                         &nbsp;Instill mixture into the clogged 

tube and leave in tube to dwell for a 

minimum of 10 minutes



                                         (30 minutes max).&nbsp;Gently pull back

 and forth on the syringe plunger to 

loosen the



                                         clog.&nbsp;May repeat this one time.&nb

sp;Flush with 30 mL water once tube is 

cleared.<br>

 





                                         Group 4:



                                         LORazepam (ATIVAN) tablet 1-2 mgJump to

 med



                                         1-2 mg, Oral, As needed, Based on CIWA-

Ar Score, Starting on Sat 21 at 

0948<br>Score 0 to 8 -



                                         Give NO dose and Reassess CIWA-Ar Score

, vital signs every 4 hours&nbsp;Score 9

 to 15 - Give 1 mg



                                         and Reassess CIWA-Ar Score, vital signs

 and re-administer medication if needed 

every 1 hour after



                                         each dose then based on the CIWA&nbsp;S

core greater than 15 - Give 2 mg 

Reassess CIWA-Ar Score,



                                         vital signs and re-administer medicatio

n if needed every 1 

hour&nbsp;&nbsp;**Hold Any Dose For



                                         Excess Sedation**&nbsp;**IF PATIENT REC

EIVES MORE THAN 24 MG IN 24 HOUR PERIOD,

 CALL PHYSICIAN FOR



                                         FURTHER DOSING INSTRUCTIONS/REVISIONS**

<br>

 





                                         Or



                                         LORazepam (ATIVAN) injection 1-2 mgJump

 to med



                                         1-2 mg, Intravenous, As needed, Base on

 CIWA-Ar Score, Starting on Sat 21

 at 0948<br>Score 0



                                         to 8 - Give NO dose and Reassess CIWA-A

r Score, vital signs every 4 

hours&nbsp;Score 9 to 15 - Give



                                         1 mg and Reassess CIWA-Ar Score, vital 

signs and re-administer medication if 

needed every 1 hour



                                         after each dose then based on the CIWA&

nbsp;Score greater than 15 - Give 2 mg 

Reassess CIWA-Ar



                                         Score, vital signs and re-administer me

dication if needed every 1 

hour&nbsp;&nbsp;**Hold Any Dose



                                         For Excess Sedation**&nbsp;**IF PATIENT

 RECEIVES MORE THAN 24 MG IN 24 HOUR 

PERIOD, CALL PHYSICIAN



                                         FOR FURTHER DOSING INSTRUCTIONS/REVISIO

NS**&nbsp;Maximum IV push rate of 2 

mg/min; max IVP dose is 4



                                         mg.<br>

 





                                         Group 5:



                                         prochlorperazine (COMPAZINE) injection 

2.5-5 mgJump to med



                                         2.5-5 mg, Intravenous, Every 4 hours ME

N, nausea/vomiting (1st line), Starting 

on 21 at



                                         2309<br>May repeat 2.5 mg dose x 1 afte

r 30 minutes if first dose 

ineffective.&nbsp;Do not exceed a



                                         total dose of 40 mg within a 24 hour pe

riod.&nbsp;Rate of administration should

 not exceed 5



                                         mg/minute.<br>

 





                                         Or



                                         prochlorperazine (COMPAZINE) injection 

2.5-5 mgJump to med



                                         2.5-5 mg, Intramuscular, Every 4 hours 

PRN, nausea/vomiting (1st line), 

Starting on 21 at



                                         2309<br>Administer if patient does not 

have IV access.&nbsp;May repeat 2.5 mg 

dose x 1 after 60



                                         minutes if first dose ineffective.&nbsp

;Do not exceed a total dose of 40 mg 

within a 24 hour



                                         period.<br>

 





                                         Or



                                         prochlorperazine (COMPAZINE) suppositor

y 25 mgJump to med



                                         25 mg, Rectal, Every 12 hours PRN, naus

ea/vomiting (1st line), Starting on 21 at



                                         2309<br>Administer if patient does not 

have IV access and refuses IM 

injection.<br>



documented in this encounter



Care Teams





                          Start Date                End Date



                     Team Member         Relationship        Specialty  

 

                          21                   



                     Henrik Allison MD       PCP - General       Pediatrics  



                                         [address] [place] [id number]    



                                         [phone] (Work)    



                                         [phone] (Fax)    



documented as of this encounter Referral sent in  Patient will only be given a one week supply of ibuprofen 400 mg, these medicines have significant side effects especially at high doses including kidney disease, gastrointestinal bleeding, and increased risk of heart attacks  if she wants to be on these long term she needs to have a discussion with the physician in the office about risks and benefits

## 2021-11-22 NOTE — XMS REPORT
Encounter Summary

                             Created on: 2021



Catrachito Angel

External Reference #: [id number]

: 1950

Sex: Male



Demographics





                          Address                   [address] [place]  [address]

 

                          Home Phone                [phone]

 

                          Preferred Language        English

 

                          Marital Status            Single

 

                          [employer] Affiliation     Unknown

 

                          Race                      White

 

                          Ethnic Group              Not  or 





Author





                          Author                    [facility]

 

                          Organization              [facility]

 

                          Address                   Unknown

 

                          Phone                     Unavailable







Support





                Name            Relationship    Address         Phone

 

                Steven Yanez ECON            Unknown         [phone]







Care Team Providers





                    Care Team Member Name Role                Phone

 

                    Henrik Allison MD        PCP                 [phone]







Encounter Details





                          Care Team                 Description



                     Date                Type                Department  

 

                                        



Jabari, Interface Unk Provider              



                     2021          [employer]          [facility]

e  



                                         Location  







Social History





                                        Date



                 Tobacco Use     Types           Packs/Day       Years Used 

 

                                         



                                         Never Assessed    







 



                           Sex Assigned at Birth     Date Recorded

 

 



                                         Not on file 



documented as of this encounter



Plan of Treatment





                                        Date/Time



                 Name            Type            Priority        Associated Diag

noses 

 

                                        2021  1:27 AM CST



                     [employer] outside images  External Films      Routine  



                                         for PACS    



documented as of this encounter



Procedures





                                        Comments



                 Procedure Name  Priority        Date/Time       Associated Diag

nosis 

 

                                         



                     [employer] OUTSIDE IMAGES  Routine             2021  



                           FOR PACS                  1:27 AM CST  



documented in this encounter



Visit Diagnoses

Not on filedocumented in this encounter



Care Teams





                          Start Date                End Date



                     Team Member         Relationship        Specialty  

 

                          21                   



                     Henrik Allison MD       PCP - General       Pediatrics  



                                         [address] [place] [id number]    



                                         [phone] (Work)    



                                         [phone] (Fax)    



documented as of this encounter

## 2021-11-22 NOTE — XMS REPORT
Clinical Summary

                             Created on: 2021



Rebecca Angel

External Reference #: [id number]

: 1950

Sex: Male



Demographics





                          Address                   [address] [place]  [address]

 

                          Home Phone                [phone]

 

                          Preferred Language        English

 

                          Marital Status            Single

 

                          Catholic Affiliation     Unknown

 

                          Race                      White

 

                          Ethnic Group              Not  or 





Author





                          Author                    [facility]

 

                          Organization              [facility]

 

                          Address                   Unknown

 

                          Phone                     Unavailable







Support





                Name            Relationship    Address         Phone

 

                Steven Yanez ECON            Unknown         [phone]







Care Team Providers





                    Care Team Member Name Role                Phone

 

                    Henrik Allison MD        PCP                 [phone]







Allergies

No known active allergies



Medications

No known medications



Active Problems





 



                           Problem                   Noted Date

 

 



                           UTI (urinary tract infection)  2021

 

 



                           SDH (subdural hematoma)   2021

 

 



                                         Overview:



                                         Formatting of this note might be differ

ent from the original.



                                         Small L parietal acute on chronic SDH.

 

 



                           Encephalopathy            2021

 

 



                           History of cerebral aneurysm  2021

 

 



                           S/P  shunt              2021

 

 



                           COPD (chronic obstructive pulmonary disease)  

021

 

 



                           Bronchitis                2021

 

 



                           Elevated INR              2021

 

 



                           GREGORY (acute kidney injury)  2021

 

 



                           Hypernatremia             2021

 

 



                           Other specified anemias   2021

 

 



                           Elevated troponin         2021

 

 



                           Acute pain due to trauma  2021

 

 



                           Coagulopathy              2021

 

 



                           Acute encephalopathy      2021

 

 



                           Chronic respiratory failure with hypoxia  2021

 

 



                           COPD with acute exacerbation  2021

 

 



                           Leukocytosis              2021

 

 



                           Anemia                    2021

 

 



                           Transaminitis             2021

 

 



                           Hx of hepatitis C         2021







Encounters





                          Care Team                 Description



                     Date                Type                Specialty  

 

                                        



Sarah Lugo MD Arce, Dennis A, MD Saied, Nahel, MD                        Altered mental status, unspecified alter

ed mental status type 

(Primary Dx); 

Subdural hemorrhage (HCC); 

Dehydration



                     2021          [facility]

spencer  



                           Encounter                 Care  

 

                                        



Jabari, Interface Unk Provider              



                     2021          [facility]iology  

 

                                        



Jabari, Interface Unk Provider              



                     2021          [facility]          Radiology  



from Last 3 Months



Immunizations





  



                     Name                Administration Dates  Next Due







Social History





                                        Date



                 Tobacco Use     Types           Packs/Day       Years Used 

 

                                         



                                         Never Assessed    







 



                           Sex Assigned at Birth     Date Recorded

 

 



                                         Not on file 







Last Filed Vital Signs





                    Reading             Time Taken          Comments



                                         Vital Sign   

 

                    160/71              2021  9:00 AM CST  



                                         Blood Pressure   

 

                    120                 2021  9:00 AM CST  



                                         Pulse   

 

                    37 C (98.6 F)   2021  8:00 AM CST  



                                         Temperature   

 

                    27                  2021  9:00 AM CST  



                                         Respiratory Rate   

 

                    98%                 2021  9:00 AM CST  



                                         Oxygen Saturation   

 

                    -                   -                    



                                         Inhaled Oxygen   



                                         Concentration   

 

                    68.4 kg (150 lb 12.7 oz) 2021  5:27 AM CST  



                                         Weight   

 

                    167.6 cm (5' 6")    2021 11:48 PM CST  



                                         Height   

 

                    24.34               2021 11:48 PM CST  



                                         Body Mass Index   







Plan of Treatment





   



                 Health Maintenance  Due Date        Last Done       Comments

 

   



                           Medicare Annual Wellness  1950  

 

   



                           Spirometry #              1950  

 

   



                           Td/Tdap#                  1950  

 

   



                           Colorectal Screening via  1998  



                                         Colonoscopy   

 

   



                           Zoster Vaccine# (1 of 2)  1998  

 

   



                           Advance Care Plan         2013  



                                         Conversation Needed #   

 

   



                           Depression Screening      2013  



                                         PHQ-9 #   

 

   



                     COVID-19 Vaccine (3 -  2022, 



                           Booster)                  2021 

 

   



                     Fall Risk Assessment #  2022 

 

   



                     Influenza Vaccine   Completed           10/05/2021, 



                                         2006 

 

   



                     Pneumococcal Vaccine: 65+  Completed           10/05/2021, 



                           Years                     2018, 



                                         05/15/2017, 



                                         Additional 



                                         history 



                                         exists 







Procedures

The patient is currently admitted. The information in this section might not be 
complete until the patient is discharged.







                                        Comments



                 Procedure Name  Priority        Date/Time       Associated Diag

nosis 

 

                                        



Results for this procedure are in the results section.



                     ARTERIAL BLOOD GAS  STAT                2021  



                                         9:35 AM CST  

 

                                        



Results for this procedure are in the results section.



                     SODIUM              Routine             2021  



                                         8:02 AM CST  

 

                                        



Results for this procedure are in the results section.



                     VANCOMYCIN TROUGH   Timed               2021  



                                         8:02 AM CST  

 

                                        



Results for this procedure are in the results section.



                     GLUCOSE POC         Timed               2021  



                                         5:30 AM CST  

 

                                        



Results for this procedure are in the results section.



                     GLUCOSE POC         Timed               2021  



                                         12:22 AM CST  

 

                                        



Results for this procedure are in the results section.



                     ALBUMIN             Add-On              2021  



                                         12:18 AM CST  

 

                                        



Results for this procedure are in the results section.



                     CBC AND DIFF (MANUAL DIFF  Routine             2021  



                           IF NECESSARY)             12:18 AM CST  

 

                                        



Results for this procedure are in the results section.



                     BASIC METABOLIC PANEL  Routine             2021  



                                         12:18 AM CST  

 

                                        



Results for this procedure are in the results section.



                     GLUCOSE POC         Timed               2021  



                                         5:46 PM CST  

 

                                        



Results for this procedure are in the results section.



                     SODIUM              Routine             2021  



                                         3:11 PM CST  

 

                                        



Results for this procedure are in the results section.



                     GLUCOSE POC         Timed               2021  



                                         11:35 AM CST  

 

                                        



Results for this procedure are in the results section.



                     THYROID PEROXIDASE  Routine             2021  



                           ANTIBODY                  10:01 AM CST  

 

                                        



Results for this procedure are in the results section.



                     SODIUM              Routine             2021  



                                         8:27 AM CST  

 

                                        



Results for this procedure are in the results section.



                     GLUCOSE POC         Timed               2021  



                                         6:16 AM CST  

 

                                        



Results for this procedure are in the results section.



                     AMMONIA             Routine             2021  



                                         12:14 AM CST  

 

                                        



Results for this procedure are in the results section.



                     GLUCOSE POC         Timed               2021  



                                         12:05 AM CST  

 

                                        



Results for this procedure are in the results section.



                     COMPLETE BLOOD COUNT  Routine             2021  



                                         11:50 PM CST  

 

                                        



Results for this procedure are in the results section.



                     MAGNESIUM           Routine             2021  



                                         11:50 PM CST  

 

                                        



Results for this procedure are in the results section.



                     RENAL PANEL         Routine             2021  



                                         11:50 PM CST  

 

                                        



Results for this procedure are in the results section.



                     SODIUM              Routine             2021  



                                         7:45 PM CST  

 

                                        



Results for this procedure are in the results section.



                     GLUCOSE POC         Timed               2021  



                                         5:20 PM CST  

 

                                        



Results for this procedure are in the results section.



                     GLUCOSE POC         Timed               2021  



                                         3:52 PM CST  

 

                                        



Results for this procedure are in the results section.



                     XR SKULL < 4 VIEWS  Routine             2021  



                                         3:00 PM CST  

 

                                        



Results for this procedure are in the results section.



                     VITAMIN D, 25-HYDROXY  Routine             2021  



                                         12:38 PM CST  

 

                                        



Results for this procedure are in the results section.



                     THYROID STIMULATING  Routine             2021  



                           HORMONE                   12:38 PM CST  

 

                                        



Results for this procedure are in the results section.



                     GLUCOSE POC         Timed               2021  



                                         11:23 AM CST  

 

                                        



Results for this procedure are in the results section.



                     B12/FOLATE          Add-On              2021  



                                         10:35 AM CST  

 

                                        



Results for this procedure are in the results section.



                     BILIRUBIN DIRECT    Routine             2021  



                                         10:35 AM CST  

 

                                        



Results for this procedure are in the results section.



                     CREATINE KINASE     Routine             2021  



                                         10:35 AM CST  

 

                                        



Results for this procedure are in the results section.



                     US VENOUS DUPLEX UPPER  Routine             2021  



                           EXTREMITY BILAT           9:37 AM CST  

 

                                        



Results for this procedure are in the results section.



                     RAPID PLASMA REAGIN  Add-On              2021  



                                         8:03 AM CST  

 

                                        



Results for this procedure are in the results section.



                     VANCOMYCIN TROUGH   Timed               2021  



                                         8:03 AM CST  

 

                                        



Results for this procedure are in the results section.



                     SODIUM              Routine             2021  



                                         8:03 AM CST  

 

                                        



Results for this procedure are in the results section.



                     GLUCOSE POC         Timed               2021  



                                         7:32 AM CST  

 

                                        



Results for this procedure are in the results section.



                     HEMOGLOBIN AND HEMATOCRIT  Timed               2021  



                                         6:27 AM CST  

 

                                        



Results for this procedure are in the results section.



                     CROSSMATCH (PREPARE ONE  Timed               2021  



                           UNIT) RBC                 6:10 AM CST  

 

                                        



Results for this procedure are in the results section.



                     GLUCOSE POC         Timed               2021  



                                         4:14 AM CST  

 

                                        



Results for this procedure are in the results section.



                     COMPLETE BLOOD COUNT  Routine             2021  



                                         12:29 AM CST  

 

                                        



Results for this procedure are in the results section.



                     MAGNESIUM           Routine             2021  



                                         12:29 AM CST  

 

                                        



Results for this procedure are in the results section.



                     COMPREHENSIVE METABOLIC  Routine             2021  



                           PANEL                     12:29 AM CST  

 

                                        



Results for this procedure are in the results section.



                     PROTHROMBIN TIME/INR  Routine             2021  



                                         12:29 AM CST  

 

                                        



Results for this procedure are in the results section.



                     GLUCOSE POC         Timed               2021  



                                         12:19 AM CST  

 

                                        



Results for this procedure are in the results section.



                     GLUCOSE POC         Timed               2021  



                                         8:29 PM CST  

 

                                        



Results for this procedure are in the results section.



                     GLUCOSE POC         Timed               2021  



                                         4:16 PM CST  

 

                                        



Results for this procedure are in the results section.



                     GLUCOSE POC         Timed               2021  



                                         12:23 PM CST  

 

                                        



Results for this procedure are in the results section.



                     HEMOGLOBIN AND HEMATOCRIT  Routine             2021  



                                         12:23 PM CST  

 

                                        



Results for this procedure are in the results section.



                     STREP PNEUMONIAE URINE  Routine             2021  



                           ANTIGEN                   9:47 AM CST  

 

                                        



Results for this procedure are in the results section.



                     GLUCOSE POC         Timed               2021  



                                         8:35 AM CST  

 

                                        



Results for this procedure are in the results section.



                     PROTHROMBIN TIME/INR  STAT                2021  



                                         6:40 AM CST  

 

                                        



Results for this procedure are in the results section.



                     CROSSMATCH (PREPARE ONE  Timed               2021  



                           UNIT) RBC                 6:10 AM CST  

 

                                        



Results for this procedure are in the results section.



                     PREPARE PLASMA      Timed               2021  



                                         6:10 AM CST  

 

                                        



Results for this procedure are in the results section.



                     GLUCOSE POC         Timed               2021  



                                         4:45 AM CST  

 

                                         



                     OXYGEN              Routine             2021  



                                         1:57 AM CST  

 

                                        



Results for this procedure are in the results section.



                     CBC AND DIFF (MANUAL DIFF  Routine             2021  



                           IF NECESSARY)             12:28 AM CST  

 

                                        



Results for this procedure are in the results section.



                     GLUCOSE POC         Timed               2021  



                                         12:12 AM CST  

 

                                        



Results for this procedure are in the results section.



                     LACTATE VENOUS WB   STAT                2021  



                                         9:43 PM CST  

 

                                        



Results for this procedure are in the results section.



                     PHOSPHORUS          Routine             2021  



                                         9:43 PM CST  

 

                                        



Results for this procedure are in the results section.



                     MAGNESIUM           Routine             2021  



                                         9:43 PM CST  

 

                                        



Results for this procedure are in the results section.



                     BASIC METABOLIC PANEL  Routine             2021  



                                         9:43 PM CST  

 

                                        



Results for this procedure are in the results section.



                     GLUCOSE POC         Timed               2021  



                                         8:05 PM CST  

 

                                        



Results for this procedure are in the results section.



                     GLUCOSE POC         Timed               2021  



                                         6:01 PM CST  

 

                                        



Results for this procedure are in the results section.



                     CT LUMBAR SPINE     ASAP                2021  



                           RECONSTRUCTED             5:46 PM CST  

 

                                        



Results for this procedure are in the results section.



                     CT ABDOMEN PELVIS WO  ASAP                2021  



                           CONTRAST                  5:46 PM CST  

 

                                        



Results for this procedure are in the results section.



                     CT CERVICAL SPINE WO  ASAP                2021  



                           CONTRAST                  5:44 PM CST  

 

                                        



Results for this procedure are in the results section.



                     IR LUMBAR PUNCTURE W  Routine             2021  



                           FLUORO                    5:09 PM CST  

 

                                        



Results for this procedure are in the results section.



                     GLUCOSE POC         Timed               2021  



                                         4:08 PM CST  

 

                                        



Results for this procedure are in the results section.



                     EEG PROLONGED (> 60 MIN)  Routine             2021  



                                         2:52 PM CST  

 

                                        



Results for this procedure are in the results section.



                     CT THORACIC SPINE   ASAP                2021  



                           RECONSTRUCTED             1:06 PM CST  

 

                                        



Results for this procedure are in the results section.



                     HEMOGLOBIN AND HEMATOCRIT  Routine             2021  



                                         12:26 PM CST  

 

                                        



Results for this procedure are in the results section.



                     AMMONIA             STAT                2021  



                                         12:01 PM CST  

 

                                        



Results for this procedure are in the results section.



                     CLOTTING SCREEN     Routine             2021  



                                         12:00 PM CST  

 

                                        



Results for this procedure are in the results section.



                     TROPONIN-I HS 0HR   Routine             2021  



                                         11:38 AM CST  

 

                                        



Results for this procedure are in the results section.



                     GLUCOSE POC         Timed               2021  



                                         11:33 AM CST  

 

                                        



Results for this procedure are in the results section.



                     ECHO COMPLETE W DOPPLER  Routine             2021  



                           AND COLOR FLOW            11:13 AM CST  

 

                                        



Results for this procedure are in the results section.



                     ARTERIAL BLOOD GAS  STAT                2021  



                                         11:10 AM CST  

 

                                        



Results for this procedure are in the results section.



                     CT CHEST WO CONTRAST  STAT                2021  



                                         10:35 AM CST  

 

                                        



Results for this procedure are in the results section.



                     XR SKULL < 4 VIEWS  Routine             2021  



                                         9:46 AM CST  

 

                                        



Results for this procedure are in the results section.



                     XR ABDOMEN SINGLE VIEW AP  STAT                2021  



                                         9:37 AM CST  

 

                                         



                     GREEN TOP           Timed               2021  



                                         9:29 AM CST  

 

                                        



Results for this procedure are in the results section.



                     EXTRA TUBES         Routine             2021  



                                         9:29 AM CST  

 

                                        



Results for this procedure are in the results section.



                     XR CHEST SINGLE VIEW  ASAP                2021  



                           FRONTAL                   8:56 AM CST  

 

                                        



Results for this procedure are in the results section.



                     LACTATE VENOUS WB   STAT                2021  



                                         7:53 AM CST  

 

                                        



Results for this procedure are in the results section.



                     GLUCOSE POC         Timed               2021  



                                         7:40 AM CST  

 

                                         



                     TRANSFUSE PLASMA    Routine             2021  



                                         7:32 AM CST  

 

                                        



Results for this procedure are in the results section.



                     ECG                 Routine             2021  



                                         6:29 AM CST  

 

                                         



                     TRANSFUSE PLASMA    Routine             2021  



                                         5:17 AM CST  

 

                                        



Results for this procedure are in the results section.



                     TROPONIN-I HS 6HR   Timed               2021  



                                         5:14 AM CST  

 

                                        



Results for this procedure are in the results section.



                     HEPATIC FUNCTION PANEL  STAT                2021  



                           Add-On                    5:14 AM CST  

 

                                        



Results for this procedure are in the results section.



                     CT HEAD WO CONTRAST  Timed               2021  



                                         4:59 AM CST  

 

                                        



Results for this procedure are in the results section.



                     GLUCOSE POC         Timed               2021  



                                         4:16 AM CST  

 

                                         



                     TRANSFUSE RED BLOOD CELLS  Routine             2021  



                           ONE UNIT                  3:23 AM CST  

 

                                        



Results for this procedure are in the results section.



                     ECG                 Routine             2021  



                                         2:43 AM CST  

 

                                        



Results for this procedure are in the results section.



                     LACTATE VENOUS WB   Routine             2021  



                                         2:31 AM CST  

 

                                        



Results for this procedure are in the results section.



                     CREATINE KINASE     Routine             2021  



                                         2:31 AM CST  

 

                                         



                     POWERSHARE OUTSIDE IMAGES  Routine             2021  



                           FOR PACS                  1:27 AM CST  

 

                                         



                     POWERSHARE OUTSIDE IMAGES  Routine             2021  



                           FOR PACS                  1:27 AM CST  

 

                                        



Results for this procedure are in the results section.



                     ANTIBODY SCREEN     Timed               2021  



                                         1:04 AM CST  

 

                                        



Results for this procedure are in the results section.



                     ABORH TYPE          Timed               2021  



                                         1:04 AM CST  

 

                                        



Results for this procedure are in the results section.



                     TYPE AND SCREEN     Routine             2021  



                                         1:04 AM CST  

 

                                        



Results for this procedure are in the results section.



                     TROPONIN-I HS 2HR   Timed               2021  



                                         1:02 AM CST  

 

                                        



Results for this procedure are in the results section.



                     RETYPE PATIENT ABORH  Routine             2021  



                                         1:02 AM CST  

 

                                        



Results for this procedure are in the results section.



                     RETYPE PATIENT ABORH  Routine             2021  



                                         1:02 AM CST  

 

                                        



Results for this procedure are in the results section.



                     PROCALCITONIN       Routine             2021  



                                         1:02 AM CST  

 

                                        



Results for this procedure are in the results section.



                     COMPLETE BLOOD COUNT  Routine             2021  



                                         1:02 AM CST  

 

                                        



Results for this procedure are in the results section.



                     GLUCOSE POC         Timed               2021  



                                         12:25 AM CST  

 

                                        



Results for this procedure are in the results section.



                     CT HEAD WO CONTRAST  STAT                2021  



                                         11:59 PM CST  

 

                                        



Results for this procedure are in the results section.



                     CREATINE KINASE     STAT                2021  



                                         11:25 PM CST  

 

                                        



Results for this procedure are in the results section.



                     COMPREHENSIVE METABOLIC  Routine             2021  



                           PANEL                     11:25 PM CST  

 

                                        



Results for this procedure are in the results section.



                     ARTERIAL BLOOD GAS  STAT                2021  



                                         11:25 PM CST  

 

                                        



Results for this procedure are in the results section.



                     EXTRA URINE SPECIMEN IN  Routine             2021  



                           NEW TUBE                 11:16 PM CST  

 

                                        



Results for this procedure are in the results section.



                     ECG                 STAT                2021  



                                         11:14 PM CST  

 

                                        



Results for this procedure are in the results section.



                     SARS-COV-2 (COVID-19)  Routine             2021  



                                         11:10 PM CST  

 

                                        



Results for this procedure are in the results section.



                     URINALYSIS MICROSCOPIC  ASAP                2021  



                           ONLY                      11:09 PM CST  

 

                                        



Results for this procedure are in the results section.



                     TOXICOLOGY SCREENING  STAT                2021  



                           PANEL                     11:09 PM CST  

 

                                        



Results for this procedure are in the results section.



                     URINALYSIS (INCLUDES  STAT                2021  



                           MICROSCOPIC REVIEW, IF    11:09 PM CST  



                                         INDICATED)    

 

                                        



Results for this procedure are in the results section.



                     XR PELVIS ONE OR TWO  STAT                2021  



                           VIEWS                     11:08 PM CST  

 

                                        



Results for this procedure are in the results section.



                     XR CHEST SINGLE VIEW  STAT                2021  



                           FRONTAL                   11:07 PM CST  

 

                                        



Results for this procedure are in the results section.



                     CLOTTING SCREEN     STAT                2021  



                                         10:49 PM CST  

 

                                        



Results for this procedure are in the results section.



                     TROPONIN-I HS 0HR   STAT                2021  



                                         10:49 PM CST  

 

                                        



Results for this procedure are in the results section.



                     COMPREHENSIVE METABOLIC  STAT                2021  



                           PANEL                     10:49 PM CST  

 

                                        



Results for this procedure are in the results section.



                     CBC AND DIFF (MANUAL DIFF  STAT                2021  



                           IF NECESSARY)             10:49 PM CST  

 

                                        



Results for this procedure are in the results section.



                     ED PROCEDURE BASIC -  Routine             2021  



                           CRITICAL CARE             10:21 PM CST  



from Last 3 Months



Results

* Arterial Blood Gas (2021  9:35 AM CST)



Only the most recent of 3 results within the time period is included.



    



              Component    Value        Ref Range    Performed At  Pathologist



                                         Signature

 

    



                 PH Arterial     7.26 (L)        7.35 - 7.45 units  [facility] 

 

    



                 PCO2 Arterial   75 (HH)         35 - 45 mmHg    [facility] 

 

    



                 PO2 Arterial    85              80 - 100 mmHg   [facility] 

 

    



                 Bicarbonate     33.7 (H)        19.0 - 29.0 mEq/L  [facility] 

 

    



                 Base Excess     4.1 (H)         -3.0 - 3.0 mEq/L  [facility] 

 

    



                     Sample site         Left Radial         [facility] 

 

    



                     Collateral          Yes                 [facility] 



                                         Circulation    

 

    



                     Device              Oxymask             [facility] 

 

    



                 LPM             3               L/min           [facility] 

 

    



                 Total Rate      24              bpm             [facility] 











                                         Specimen

 





                                         Blood - Arterial







   



                 Performing Organization  Address         City/[facility] Code  P

[facility] Number

 

   



                     [facility]                [address] [address]

11 





* Vancomycin Trough (2021  8:02 AM CST)



Only the most recent of 2 results within the time period is included.



    



              Component    Value        Ref Range    Performed At  Pathologist



                                         Signature

 

    



                 Vancomycin      22.0 (H)        10.0 - 20.0 ug/mL  [facility] 



                                         Trough    











                                         Specimen

 





                                         Blood - Central







   



                 Performing Organization  Address         City/[facility]/ZIP Code  P

[facility] Number

 

   



                     [facility]                [address] [address]

11 





* Sodium (2021  8:02 AM CST)



Only the most recent of 5 results within the time period is included.



    



              Component    Value        Ref Range    Performed At  Pathologist



                                         Signature

 

    



                 Sodium          154 (H)         136 - 145 mEq/L  [facility] 











                                         Specimen

 





                                         Blood - Central







   



                 Performing Organization  Address         City/[facility]/ZIP Code  P

[facility] Number

 

   



                     [facility]                [address] [address]

11 





* GLUCOSE POC (2021  5:30 AM CST)



Only the most recent of 25 results within the time period is included.



    



              Component    Value        Ref Range    Performed At  Pathologist



                                         Signature

 

    



                 Glucose POC     133 (H)         70 - 100 mg/dL  [facility] 











                                         Specimen

 





                                         Blood - Venous







   



                 Performing Organization  Address         City/[facility]/ZIP Code  P

[facility] Number

 

   



                     [facility]                [address] [address]

11 





* CBC and Diff (manual diff if necessary) (2021 12:18 AM CST)



Only the most recent of 3 results within the time period is included.



    



              Component    Value        Ref Range    Performed At  Pathologist



                                         Signature

 

    



                 WBC             11.12 (H)       4.00 - 11.00 TH/uL  [facility] 

 

    



                 RBC             3.20 (L)        4.31 - 5.84 mil/uL  [facility] 

 

    



                 Hemoglobin      7.9 (L)         13.0 - 17.0 g/dL  [facility] 

 

    



                 Hematocrit      29 (L)          40 - 50 %       [facility] 

 

    



                 MCV             90              80 - 99 fL      [facility] 

 

    



                 MCH             25 (L)          27 - 34 pg      [facility] 

 

    



                 MCHC            27 (L)          32 - 36 %       [facility] 

 

    



                 RDW             20.0 (H)        9.0 - 14.5 %    [facility] 

 

    



                 Platelet Count  147             140 - 400 Th/uL  [facility] 

 

    



                 MPV             12.1            9.4 - 12.3 fL   [facility] 

 

    



                 Nucleated RBCs  0               0 - 0 /100 WBC  [facility] 

 

    



                 % Neutrophils   83 (H)          45 - 78 %       [facility] 

 

    



                 % Lymphocytes   5 (L)           15 - 47 %       [facility] 

 

    



                 % Monocytes     8               0 - 12 %        [facility] 

 

    



                 % Eosinophils   4               0 - 7 %         [facility] 

 

    



                 % Basophils     0               0 - 2 %         [facility] 

 

    



                 % Imm Grans     1               0 - 1 %         [facility] 

 

    



                 # Granulocytes  9.34 (H)        1.70 - 6.80 TH/uL  [facility] 

 

    



                 # Lymphocytes   0.57 (L)        1.00 - 3.30 TH/uL  [facility] 

 

    



                 # Monocytes     0.87            0.20 - 0.90 TH/uL  [facility] 

 

    



                 # Eosinophils   0.39            0.00 - 0.40 TH/uL  [facility] 

 

    



                 # Basophils     0.01            0.00 - 0.10 TH/uL  [facility] 











                                         Specimen

 





                                         Blood - Venous







   



                 Performing Organization  Address         City/State/ZIP Code  P

[facility] Number

 

   



                     [facility]                [address], [place] [address]

11 





* Basic Metabolic Panel (2021 12:18 AM CST)



Only the most recent of 2 results within the time period is included.



    



              Component    Value        Ref Range    Performed At  Pathologist



                                         Signature

 

    



                 Sodium          154 (H)         136 - 145 mEq/L  [facility] 

 

    



                 Potassium       4.4             3.4 - 5.1 mEq/L  [facility] 

 

    



                 Chloride        120 (H)         98 - 107 mEq/L  [facility] 

 

    



                 Carbon Dioxide  32 (H)          20 - 31 mEq/L   [facility] 

 

    



                 Anion Gap       2 (L)           5 - 17 mmol/L   [facility] 

 

    



                 Calcium         8.2 (L)         8.3 - 10.6 mg/dL  [facility] 

 

    



                 Glucose         123 (H)         70 - 100 mg/dL  [facility] 

 

    



                 Blood Urea      36 (H)          9 - 23 mg/dL    [facility] 



                                         Nitrogen    

 

    



                 Creatinine      1.00            0.70 - 1.30 mg/dL  [facility] 

 

    



                 eGFR Male       73.2            60.0 - 200.0    [facility] 



                           Non-AA                    mL/min/1.73m*2  

 

    



                 eGFR Male AA    88.8            60.0 - 200.0    [facility] 



                                         mL/min/1.73m*2  

 

    



                 eGFR Female     54.3 (L)        60.0 - 200.0    [facility] 



                           Non-AA                    mL/min/1.73m*2  

 

    



                 eGFR Female AA  65.9            60.0 - 200.0    [facility] 



                                         mL/min/1.73m*2  











                                         Specimen

 





                                         Blood - Venous







   



                 Performing Organization  Address         City/[facility] Code  P

[facility] Number

 

   



                     [facility]                [address] [address]

11 





* Albumin (2021 12:18 AM CST)



    



              Component    Value        Ref Range    Performed At  Pathologist



                                         Signature

 

    



                 Albumin         3.3 (L)         3.5 - 5.0 g/dL  [facility] 











                                         Specimen

 





                                         Blood - Venous







   



                 Performing Organization  Address         City/State/ZIP Code  P

[facility] Number

 

   



                     [facility]                [address] [address]

11 





* Thyroid Peroxidase Antibody (2021 10:01 AM CST)



    



              Component    Value        Ref Range    Performed At  Pathologist



                                         Signature

 

    



                 Thyroid         <28.0           0.0 - 60.0 IU/mL  [facility] 



                                         Peroxidase    



                                         Antibody    











                                         Specimen

 





                                         Blood - Venous







                                        Narrative

 

                                        



[facility] - 2021 11:11 AM CST



Reference range updated 10/24/21 with [facility] conversion to [employer] 
instrumentation.







   



                 Performing Organization  Address         City/[facility]/ZIP Code  P

[facility] Number

 

   



                     [facility]                [address] [address]

11 





* Ammonia (2021 12:14 AM CST)



Only the most recent of 2 results within the time period is included.



    



              Component    Value        Ref Range    Performed At  Pathologist



                                         Signature

 

    



                 Ammonia         12              11 - 32 umol/L  [facility] 











                                         Specimen

 





                                         Blood - Venous







   



                 Performing Organization  Address         City/[facility]/ZIP Code  P

[facility] Number

 

   



                     [facility]                [address] [address]

11 





* Renal Panel (2021 11:50 PM CST)



    



              Component    Value        Ref Range    Performed At  Pathologist



                                         Signature

 

    



                 Sodium          153 (H)         136 - 145 mEq/L  [facility] 

 

    



                 Potassium       4.4             3.4 - 5.1 mEq/L  [facility] 

 

    



                 Chloride        120 (H)         98 - 107 mEq/L  [facility] 

 

    



                 Carbon Dioxide  29              20 - 31 mEq/L   [facility] 

 

    



                 Anion Gap       4 (L)           5 - 17 mmol/L   [facility] 

 

    



                 Anion Gap       4               mmol/L          [facility] 

 

    



                 Calcium         8.3             8.3 - 10.6 mg/dL  [facility] 

 

    



                 Glucose         173 (H)         70 - 100 mg/dL  [facility] 

 

    



                 Albumin         3.2 (L)         3.5 - 5.0 g/dL  [facility] 

 

    



                 Blood Urea      46 (H)          9 - 23 mg/dL    [facility] 



                                         Nitrogen    

 

    



                 Creatinine      1.20            0.70 - 1.30 mg/dL  [facility] 

 

    



                 eGFR Female AA  53.4 (L)        60.0 - 200.0    [facility] 



                                         mL/min/1.73m*2  

 

    



                 eGFR Female     44.0 (L)        60.0 - 200.0    [facility] 



                           Non-AA                    mL/min/1.73m*2  

 

    



                 eGFR Male AA    71.9            60.0 - 200.0    [facility] 



                                         mL/min/1.73m*2  

 

    



                 eGFR Male       59.3 (L)        60.0 - 200.0    [facility] 



                           Non-AA                    mL/min/1.73m*2  

 

    



                 Phosphorus      3.3             2.4 - 5.1 mg/dL  [facility] 











                                         Specimen

 





                                         Blood - Venous







   



                 Performing Organization  Address         City/State/ZIP Code  P

[facility] Number

 

   



                     [facility]                [address] [address]

11 





* Magnesium (2021 11:50 PM CST)



Only the most recent of 3 results within the time period is included.



    



              Component    Value        Ref Range    Performed At  Pathologist



                                         Signature

 

    



                 Magnesium       2.50            1.60 - 2.60 mg/dL  [facility] 











                                         Specimen

 





                                         Blood - Venous







   



                 Performing Organization  Address         City/[facility]/ZIP Code  P

kathlene Number

 

   



                     [facility]                [address] [address]

11 





* Complete Blood Count (2021 11:50 PM CST)



Only the most recent of 3 results within the time period is included.



    



              Component    Value        Ref Range    Performed At  Pathologist



                                         Signature

 

    



                 WBC             10.85           4.00 - 11.00 TH/uL  [facility] 

 

    



                 RBC             3.34 (L)        4.31 - 5.84 mil/uL  [facility] 

 

    



                 Hemoglobin      8.0 (L)         13.0 - 17.0 g/dL  [facility] 

 

    



                 Hematocrit      29 (L)          40 - 50 %       [facility] 

 

    



                 MCV             87              80 - 99 fL      [facility] 

 

    



                 MCH             24 (L)          27 - 34 pg      [facility] 

 

    



                 MCHC            27 (L)Comment: A review  32 - 36 %       [facility] 



                                         indicates this result to be   



                                         consistent with previous   



                                         results.   

 

    



                 RDW             19.4 (H)        9.0 - 14.5 %    [facility] 

 

    



                 Platelet Count  169             140 - 400 Th/uL  [facility] 

 

    



                 MPV             12.1            9.4 - 12.3 fL   [facility] 

 

    



                 Nucleated RBCs  0               0 - 0 /100 WBC  [facility] 











                                         Specimen

 





                                         Blood - Venous







   



                 Performing Organization  Address         City/State/ZIP Code  P

kathleen Number

 

   



                     [facility]                [address], 

11 





* XR Skull < 4 views (2021  3:00 PM CST)



Only the most recent of 2 results within the time period is included.



                                        Modality



                           Anatomical Region         Laterality 

 

                                        Computed Radiography



                                         Head  











                                         Specimen

 









                                        Impressions

 

                                        



[facility] - 2021  4:02 PM CST



Findings/impression:

 

Portable AP and lateral skull radiographs were performed.

 

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

not well profiled. Cannot determine the type of shunt reservoir based on

these images. Recommend discussion with DPOA regarding where shunt was

originally placed and attempt at obtaining prior records.

 

Partially imaged nasogastric tube. No significant fluid within the

paranasal sinuses. Incompletely characterized cervical spondylosis.

 

ATTESTATION STATEMENT: Staff radiologist has personally reviewed the

images and dictated, reviewed and/or edited the final report.

 

READING SITE: [address] - 2021  4:02 PM CST



 

Patient:  REBECCA ANGEL  

 

Sex#: M               

#: 1950       Win[place]#: [id number]

Location: [place] [id number]  Accession#: [id number]

 

Ordering Provider:  ANIKA SENA 

Procedure Requested: [id number] XR SKULL < 4 VIEWS

Reason for Exam: Profile shunt. Call Radiology when images are taken.

Exam Ordered:    2021 1414

Begin exam date/time: 2021 1455

Exam Date/Time:   2021 1500

 

 

 XR SKULL < 4 VIEWS

 

INDICATION: Profile shunt. 

 

COMPARISON: Skull radiographs 2021, CT head 2021.

 







                                        Procedure Note

 

                                        



Nickolas Mccormick DO - 2021



Formatting of this note might be different from the original.

 

Patient:   REBECCA ANGEL   



Sex#: M                             

#: 1950             [place]#: [id number]

Location: [place] [id number]-   Accession#: [id number]

 

Ordering Provider:   ANIKA SENA 

Procedure Requested:  [id number] XR SKULL < 4 VIEWS

Reason for Exam:  Profile shunt. Call Radiology when images are taken.

Exam Ordered:        2021  1414

Begin exam date/time:  2021  1455

Exam Date/Time:      2021  1500

 

 

 XR SKULL < 4 VIEWS



INDICATION:  Profile shunt. 



COMPARISON: Skull radiographs 2021, CT head 2021.



IMPRESSION

Findings/impression:



Portable AP and lateral skull radiographs were performed.



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

not well profiled. Cannot determine the type of shunt reservoir based on

these images. Recommend discussion with [facility]POA regarding where shunt was

originally placed and attempt at obtaining prior records.



Partially imaged nasogastric tube. No significant fluid within the

paranasal sinuses. Incompletely characterized cervical spondylosis.



ATTESTATION STATEMENT: Staff radiologist has personally reviewed the

images and dictated, reviewed and/or edited the final report.



READING SITE: [facility]

 







   



                 Performing Organization  Address         City/[facility]/ZIP Code  P

kathleen Number

 

   



                                         [facility]   





* Vitamin D, 25-Hydroxy (2021 12:38 PM CST)



    



              Component    Value        Ref Range    Performed At  Pathologist



                                         Signature

 

    



                 Vitamin D       50              30 - 100 ng/mL  [facility] 



                                         25-Hydroxy    











                                         Specimen

 





                                         Blood - Venous







                                        Narrative

 

                                        



[facility] - 2021  1:08 PM CST



Vitamin D Ref Range =>21 Years

Deficiency: <20 ng/mL

Insufficiency: 20 - <30 ng/mL

Sufficiency: 30 - 100 ng/mL

Toxicity Possible: >100 ng/mL







   



                 Performing Organization  Address         City/[facility]/ZIP Code  P

kathleen Number

 

   



                     [facility]                [address] [address]

11 





* Thyroid Stimulating Hormone (2021 12:38 PM CST)



    



              Component    Value        Ref Range    Performed At  Pathologist



                                         Signature

 

    



                 Thyroid         5.53 (H)        0.55 - 4.78 uIU/mL  [facility] 



                                         Stimulating    



                                         Hormone    











                                         Specimen

 





                                         Blood - Venous







                                        Narrative

 

                                        



[facility] - 2021  1:08 PM CST



Reference range updated 10/24/21 with [facility] conversion to [employer] 
instrumentation.







   



                 Performing Organization  Address         City/[facility]/ZIP Code  P

kathleen Number

 

   



                     [facility]                [address] [address]

11 





* Creatine Kinase (2021 10:35 AM CST)



Only the most recent of 3 results within the time period is included.



    



              Component    Value        Ref Range    Performed At  Pathologist



                                         Signature

 

    



                 Creatine Kinase  151             46 - 171 U/L    [facility] 











                                         Specimen

 





                                         Blood - Venous







                                        Narrative

 

                                        



[facility] - 2021 11:03 AM CST



Reference range updated 10/24/21 with [facility] conversion to [employer] 
instrumentation.







   



                 Performing Organization  Address         City/State/ZIP Code  P

kathleen Number

 

   



                     [facility]                [address] [address]

11 





* Bilirubin Direct (2021 10:35 AM CST)



    



              Component    Value        Ref Range    Performed At  Pathologist



                                         Signature

 

    



                 Bilirubin       0.20            0.00 - 0.30 mg/dL  [facility] 



                                         Direct    











                                         Specimen

 





                                         Blood - Venous







   



                 Performing Organization  Address         City/State/[facility] Code  P

kathleen Number

 

   



                     [facility]                [address] [address]

11 





* B12/Folate (2021 10:35 AM CST)



    



              Component    Value        Ref Range    Performed At  Pathologist



                                         [facility]

 

    



                 Vitamin B12     1,218 (H)       211 - 911 pg/mL  [facility] 

 

    



                 Folate          18.46           5.38 - 24.00 ng/mL  [facility] 











                                         Specimen

 





                                         Blood - Venous







   



                 Performing Organization  Address         City/[facility] Code  P

kathleen Number

 

   



                     [facility]                [address] [address]

11 





* US Venous Duplex Upper Extremity bilat (2021  9:37 AM CST)



                                        Modality



                           Anatomical Region         Laterality 

 

                                        Ultrasound



                                         Arm  











                                         Specimen

 









                                        Impressions

 

                                        



[facility] - 2021 10:37 AM CST



 

Right upper extremity superficial thrombus, involving the cephalic vein

in the mid and distal upper arm and antecubital fossa, and extending

into the antecubital vein.

 

No evidence of DVT in the upper extremities. The right ulnar vein could

not be visualized or assessed on this exam.

 

 

Findings were discussed with LISETH Mcfarlane for this patient, by the

sonographer at the completion of this exam.

 

READING SITE: [facility]







                                        Narrative

 

                                        



[facility] - 2021 10:37 AM CST



 

Patient:  REBECCA ANGEL  

 

Sex#: M               

#: 1950       [place]#: [id number]

Location: [address]  Accession#: [id number]

 

Ordering Provider:  MARIKA BAH 

Procedure Requested: [id number] US VENOUS DUPLEX UPPER EXTREMITY BILAT

Reason for Exam: BUE edema, immobility

Exam Ordered:    2021 0825

Begin exam date/time: 2021 0936

Exam Date/Time:   2021 0937

 

 

US VENOUS DUPLEX UPPER EXTREMITY BILAT 

 

INDICATION: BUE edema, immobility 

 

COMPARISON: None available 

 

TECHNIQUE: Grayscale, color and spectral Doppler evaluation of the

bilateral upper extremity veins.

 

FINDINGS: 

 

Right upper extremity: There is normal compressibility and color flow in

the internal jugular and axillary veins. Maintained color flow in the

subclavian vein. Distally in the upper extremity the brachial and radial

veins appear patent. The ulnar veins could not be visualized, due to

difficulty with patient cooperation and movement.

 

There is superficial thrombus in the right cephalic vein, involving the

mid and distal upper arm, and extending into the antecubital fossa.

Additional superficial thrombus in the right antecubital vein.

 

Left upper extremity: There is normal compressibility and color flow in

the internal jugular and axillary veins. Maintained color flow in the

subclavian vein. Distally in the upper extremity the visualized segments

of the brachial, ulnar and radial veins show normal compressibility and

flow. 

 

The superficial basilic and cephalic veins also appear patent with

normal compressibility and flow. Portions of the left upper extremity

venous system cannot be visualized due to overlying IV site.

 

 







                                        Procedure Note

 

                                        



GOMEZ Larsen MD - 2021



Formatting of this note might be different from the original.

 

Patient:   REBECCA ANGEL   



Sex#: M                             

#: 1950             [place]#: [id number]

Location: [address]   Accession#: [id number]

 

Ordering Provider:   MARIKA BAH 

Procedure Requested:  [id number] US VENOUS DUPLEX UPPER EXTREMITY BILAT

Reason for Exam:  BUE edema, immobility

Exam Ordered:        2021  0825

Begin exam date/time:  2021  0936

Exam Date/Time:      2021  0937

 

 

US VENOUS DUPLEX UPPER EXTREMITY BILAT 



INDICATION: BUE edema, immobility 



COMPARISON: None available 



TECHNIQUE: Grayscale, color and spectral Doppler evaluation of the

bilateral upper extremity veins.



FINDINGS:  



Right upper extremity: There is normal compressibility and color flow in

the internal jugular and axillary veins. Maintained color flow in the

subclavian vein. Distally in the upper extremity the brachial and radial

veins appear patent. The ulnar veins could not be visualized, due to

difficulty with patient cooperation and movement.



There is superficial thrombus in the right cephalic vein, involving the

mid and distal upper arm, and extending into the antecubital fossa.

Additional superficial thrombus in the right antecubital vein.



Left upper extremity: There is normal compressibility and color flow in

the internal jugular and axillary veins. Maintained color flow in the

subclavian vein. Distally in the upper extremity the visualized segments

of the brachial, ulnar and radial veins show normal compressibility and

flow. 



The superficial basilic and cephalic veins also appear patent with

normal compressibility and flow. Portions of the left upper extremity

venous system cannot be visualized due to overlying IV site.





IMPRESSION



Right upper extremity superficial thrombus, involving the cephalic vein

in the mid and distal upper arm and antecubital fossa, and extending

into the antecubital vein.



No evidence of DVT in the upper extremities. The right ulnar vein could

not be visualized or assessed on this exam.





Findings were discussed with LISETH Mcfarlane for this patient, by the

sonographer at the completion of this exam.



READING SITE: [facility]







   



                 Performing Organization  Address         City/[facility]/ZIP Code  P

kathleen Number

 

   



                                         MCKESSON   





* Rapid Plasma Reagin (2021  8:03 AM CST)



    



              Component    Value        Ref Range    Performed At  Pathologist



                                         [facility]

 

    



                 RPR             Nonreactive     Nonreactive     [facility] 











                                         Specimen

 





                                         Blood - Venous







   



                 Performing Organization  Address         City/[facility]/ZIP Code  P

kathleen Number

 

   



                     [facility]                [address] [address]

11 





* Hemoglobin and Hematocrit (2021  6:27 AM CST)



Only the most recent of 3 results within the time period is included.



    



              Component    Value        Ref Range    Performed At  Pathologist



                                         Signature

 

    



                 Hemoglobin      8.0 (L)         13.0 - 17.0 g/dL  [facility] 

 

    



                 Hematocrit      28 (L)          40 - 50 %       [facility] 











                                         Specimen

 





                                         Blood - Central







   



                 Performing Organization  Address         City/[facility]/ZIP Code  P

[facility] Number

 

   



                     [facility]                [address] [address]

11 





* 1 Units (2021  6:10 AM CST)



Only the most recent of 2 results within the time period is included.



    



              Component    Value        Ref Range    Performed At  [employer]



                                         [facility]

 

    



                     01 - CROSS          Compatible          [facility] 



                                         [facility] 

 

    



                      - BLOOD TYPE     A Pos               [facility] 



                                         [facility] 

 

    



                      - UNIT           [id number]       [facility] 



                                         [facility] 

 

    



                      - STATUS         Transfused          [facility] 



                                         [facility] 

 

    



                      - PRODUCT ID     Red Blood Cells     [facility] 



                                         [facility] 

 

    



                      - PRODUCT        [id number]            [facility] 



                                         [facility] 











                                         Specimen

 





                                         Other - Blood







   



                 Performing Organization  Address         City/State/ZIP Code  P

kathleen Number

 

   



                     [facility]  [address] [place] 

[address] 



                                         BLOOD BANK   





* Transfuse RBC (2021  5:25 AM CST)

* Prothrombin Time/INR (2021 12:29 AM CST)



Only the most recent of 2 results within the time period is included.



    



              Component    Value        Ref Range    Performed At  Pathologist



                                         Signature

 

    



                 Protime         16.0 (H)        11.4 - 15.0 Sec  [facility] 

 

    



                 INR             1.3 (H)         0.8 - 1.2       [facility] 











                                         Specimen

 





                                         Blood - Central







   



                 Performing Organization  Address         City/State/ZIP Code  P

kathleen Number

 

   



                     [facility]                [address], 

11 





* Comprehensive Metabolic Panel (2021 12:29 AM CST)



Only the most recent of 3 results within the time period is included.



    



              Component    Value        Ref Range    Performed At  Pathologist



                                         Signature

 

    



                 Sodium          152 (H)         136 - 145 mEq/L  [facility] 

 

    



                 Potassium       4.3             3.4 - 5.1 mEq/L  [facility] 

 

    



                 Chloride        119 (H)         98 - 107 mEq/L  [facility] 

 

    



                 Carbon Dioxide  28              20 - 31 mEq/L   [facility] 

 

    



                 Anion Gap       5               5 - 17 mmol/L   [facility] 

 

    



                 Anion Gap       5               mmol/L          [facility] 

 

    



                 Calcium         8.0 (L)         8.3 - 10.6 mg/dL  [facility] 

 

    



                 Glucose         151 (H)         70 - 100 mg/dL  [facility] 

 

    



                 Protein Total   5.2 (L)         5.7 - 8.2 g/dL  [facility] 



                                         Serum    

 

    



                 Albumin         3.2 (L)         3.5 - 5.0 g/dL  [facility] 

 

    



                 Alkaline        50Comment: Reference range  46 - 116 U/L    [facility]

L 



                           Phosphatase               updated 10/24/21 with [facility] 

  



                                         conversion to [employer]   



                                         instrumentation.   

 

    



                 Alanine         114 (H)         0 - 49 U/L      [facility] 



                                         [employer]    



                                         e    

 

    



                 Aspartate       98 (H)Comment: Reference range  0 - 34 U/L     

 [facility] 



                           Aminotransferas           updated 10/24/21 with [facility] 

  



                           e                         conversion to [employer]

[employer]   



                                         instrumentation.   

 

    



                 Bilirubin Total  0.40            0.20 - 1.10 mg/dL  [facility] 

 

    



                 Blood Urea      46 (H)          9 - 23 mg/dL    [facility] 



                                         Nitrogen    

 

    



                 Creatinine      1.20            0.70 - 1.30 mg/dL  [facility] 

 

    



                 eGFR Female AA  53.4 (L)        60.0 - 200.0    [facility] 



                                         mL/min/1.73m*2  

 

    



                 eGFR Female     44.0 (L)        60.0 - 200.0    [facility] 



                           Non-AA                    mL/min/1.73m*2  

 

    



                 eGFR Male AA    71.9            60.0 - 200.0    [facility] 



                                         mL/min/1.73m*2  

 

    



                 eGFR Male       59.3 (L)        60.0 - 200.0    [facility] 



                           Non-AA                    mL/min/1.73m*2  











                                         Specimen

 





                                         Blood - Central







   



                 Performing Organization  Address         City/[facility]/ZIP Code  P

kathleen Number

 

   



                     [facility]                [address], [place] [address]

11 





* Strep Pneumoniae Urine Antigen (2021  9:47 AM CST)



    



              Component    Value        Ref Range    Performed At  Pathologist



                                         Signature

 

    



                 Streptococcus   Negative        Negative        [facility] 



                                         pneumoniae    



                                         Urine Antigen    











                                         Specimen

 





                                         Urine - Urine







   



                 Performing Organization  Address         City[facility]/ZIP Code  P

kathleen Number

 

   



                     [facility]                [address], [place] [address]

11 





* 2 Units (2021  6:10 AM CST)



    



              Component    Value        Ref Range    Performed At  Pathologist



                                         Signature

 

    



                     01 - BLOOD TYPE     A Pos               [facility] 



                                         [facility] 

 

    



                     01 - UNIT           [id number]       [facility] 



                                         [facility] 

 

    



                     01 - STATUS         Transfused          [facility] 



                                         [facility] 

 

    



                     01 - PRODUCT ID     FFP                 [facility] 



                                         [facility] 

 

    



                     01 - PRODUCT        [id number]            [facility] 



                                         [facility] 

 

    



                     01 - BLOOD TYPE     A Neg               [facility] 



                                         [facility] 

 

    



                     01 - UNIT           [id number]       [facility] 



                                         [facility] 

 

    



                     01 - STATUS         Transfused          [facility] 



                                         [facility] 

 

    



                     01 - PRODUCT ID     FFP                 [facility] 



                                         [facility] 

 

    



                     01 - PRODUCT        [id number]            [facility] 



                                         [facility] 











                                         Specimen

 





                                         Blood - Blood







   



                 Performing Organization  Address         City/[facility]/ZIP Code  P

kathleen Number

 

   



                     [facility]  [address], [place] 

[address] 



                                         BLOOD BANK   





* Phosphorus (2021  9:43 PM CST)



    



              Component    Value        Ref Range    Performed At  Pathologist



                                         Signature

 

    



                 Phosphorus      4.1             2.4 - 5.1 mg/dL  [facility] 











                                         Specimen

 





                                         Blood - Venous







   



                 Performing Organization  Address         City/[facility]/ZIP Code  P

kathleen Number

 

   



                     [facility]                [address], 

[address] 





* Lactate Venous WB (2021  9:43 PM CST)



Only the most recent of 3 results within the time period is included.



    



              Component    Value        Ref Range    Performed At  Pathologist



                                         Signature

 

    



                 Lactate Venous  1.5             0.0 - 2.0 mmol/L  [facility] 











                                         Specimen

 





                                         Blood - Venous







   



                 Performing Organization  Address         City/State/ZIP Code  P

kathleen Number

 

   



                     [facility]                [address], 

[address] 





* CT Lumbar Spine reconstructed (2021  5:46 PM CST)



                                        Modality



                           Anatomical Region         Laterality 

 

                                        Computed Tomography



                                         L-spine  











                                         Specimen

 









                                        Impressions

 

                                        



[facility] - 2021  6:24 PM CST



 

                                        1. No acute osseous abnormality of the

 lumbar spine.

                                        2. Mild to moderate multilevel lumbar 

spondylosis.

 

READING SITE: [facility].

 

ATTESTATION STATEMENT:

The Staff Radiologist has personally reviewed the images and dictated,

reviewed, or edited the final report.







                                        Narrative

 

                                        



HERNESTO - 2021  6:24 PM CST



 

Patient:  REBECCA ANGEL  

 

Sex#: M               

#: 1950       [place]#: [id number]

Location: [address] ICU [address]01  Accession#: [id number]

 

Ordering Provider:  THOMAS DAS 

Procedure Requested: [id number] CT LUMBAR SPINE RECONSTRUCTED

Reason for Exam: trauma

Exam Ordered:    2021 1256

Begin exam date/time: 2021 1732

Exam Date/Time:   2021 1746

 

 

CT LUMBAR SPINE RECONSTRUCTED 

 

Date: 2021 5:47 PM 

 

Indication: trauma confusion. Initial encounter

 

Comparison: None. 

 

Technique: Helical CT images of the lumbar spine were obtained without

contrast. Coronal and sagittal reformatted images were also performed.

One or more of the following dose reduction techniques were utilized:

Automated exposure control (AEC), Adjustment of mA and/or kV according

to patient size, Use of iterative reconstruction technique such as ASiR,

CT scan done according to ALARA and image gently/image wisely.

 

Findings: 

 

Trace retrolisthesis of L1 on L2 and L5 on S1.. No acute fracture.

Vertebral body heights are maintained without compression deformity.

Moderate multilevel disc desiccation and disc space height loss, most

severe at L1-L2. No aggressive lytic or blastic osseous lesion.

 

Mild multilevel spinal canal stenosis secondary to disc bulges. Mild

multilevel neural foraminal narrowing. No high grade spinal canal

stenosis or neuroforaminal narrowing.

 

Trace amount of gas within the lumbar spinal canal and adjacent soft

tissues likely relates to same day lumbar puncture.

 

Please see concurrent, separately dictated CT abdomen/pelvis report for

further details. 

 







                                        Procedure Note

 

                                        



Orville Stark MD - 2021



Formatting of this note might be different from the original.

 

Patient:   REBECCA ANGEL   



Sex#: M                             

#: 1950             [place]#: [id number]

Location: [address] ICU [address]   Accession#: [id number]

 

Ordering Provider:   THOMAS DAS 

Procedure Requested:  [id number] CT LUMBAR SPINE RECONSTRUCTED

Reason for Exam:  trauma

Exam Ordered:        2021  1256

Begin exam date/time:  2021  1732

Exam Date/Time:      2021  174

 

 

CT LUMBAR SPINE RECONSTRUCTED 



Date: 2021 5:47 PM 



Indication:  trauma confusion. Initial encounter



Comparison:  None. 



Technique:  Helical CT images of the lumbar spine were obtained without

contrast. Coronal and sagittal reformatted images were also performed.

One or more of the following dose reduction techniques were utilized:

Automated exposure control (AEC), Adjustment of mA and/or kV according

to patient size, Use of iterative reconstruction technique such as ASiR,

CT scan done according to ALARA and image gently/image wisely.



Findings: 



Trace retrolisthesis of L1 on L2 and L5 on S1.. No acute fracture.

Vertebral body heights are maintained without compression deformity.

Moderate multilevel disc desiccation and disc space height loss, most

severe at L1-L2. No aggressive lytic or blastic osseous lesion.



Mild multilevel spinal canal stenosis secondary to disc bulges. Mild

multilevel neural foraminal narrowing. No high grade spinal canal

stenosis or neuroforaminal narrowing.



Trace amount of gas within the lumbar spinal canal and adjacent soft

tissues likely relates to same day lumbar puncture.



Please see concurrent, separately dictated CT abdomen/pelvis report for

further details.  



IMPRESSION



                                        1.  No acute osseous abnormality of the 

lumbar spine.

                                        2.  Mild to moderate multilevel lumbar s

pondylosis.



READING SITE: [facility].



ATTESTATION STATEMENT:

The Staff Radiologist has personally reviewed the images and dictated,

reviewed, or edited the final report.







   



                 Performing Organization  Address         City/State/ZIP Code  P

kathleen Number

 

   



                                         [facility]   





* CT Abdomen Pelvis wo contrast (2021  5:46 PM CST)



                                        Modality



                           Anatomical Region         Laterality 

 

                                        Computed Tomography



                                         Abdomen, Pelvis  











                                         Specimen

 









                                        Impressions

 

                                        



EDWIGE - 2021  7:35 AM CST



 

 

                                        1. No evidence of acute traumatic or sol

id organ injury within the

abdomen.

                                        2. Hyperdense material within mildly dis

tended gallbladder may relate to

gallbladder sludge or cholelithiasis. Right upper quadrant ultrasound

can be obtained for further evaluation, as clinically indicated.

                                        3. Hepatic cirrhosis. Small volume abdom

inal pelvic ascites.

                                        4. Moderate colonic stool.

 

ATTESTATION STATEMENT:

The Staff Radiologist has personally reviewed the images and dictated,

reviewed, or edited the final report.

 

READING SITE: [facility]

 







                                        Narrative

 

                                        



MCKESSON - 2021  7:35 AM CST



 

Patient:  REBECCA ANGEL  

 

Sex#: M               

#: 1950       [place]#: [id number]

Location: [address] ICU [address]  Accession#: [id number]

 

Ordering Provider:  THOMAS DAS 

Procedure Requested: [id number] CT ABDOMEN PELVIS WO CONTRAST

Reason for Exam: trauma

Exam Ordered:    2021 1255

Begin exam date/time: 2021 1732

Exam Date/Time:   2021 1746

 

 

CT ABDOMEN PELVIS WO CONTRAST

 

INDICATION: Trauma

 

TECHNIQUE: CT of the abdomen and pelvis without contrast. Coronal and

sagittal reformatted images were performed.

 

COMPARISON: None.

 

FINDINGS: Trace abdominopelvic simple ascites. No free air or fluid

collections. Ventriculoperitoneal shunt catheter tubing courses through

the right hemiabdomen.

 

Lower chest: Please see same day chest CT report for additional

information.

 

ABDOMEN:

Liver: Cirrhotic morphology of the liver.

 

Gallbladder and biliary: Hyperdense material within a distended

gallbladder likely relates to small amount of sludge.

 

Spleen: Normal size spleen.

 

Pancreas: The pancreas is normal in attenuation without peripancreatic

inflammatory changes. No pancreatic duct dilation.

 

Adrenal glands: Normal size adrenal glands.

 

Kidneys and ureters: Normal size kidneys and ureters. No renal stones.

 

GI tract, Mesentery: Gastric tube curled within the proximal aspect of

the stomach. The stomach is decompressed.. Normal caliber small bowel.

Normal caliber colon. Moderate colonic stool. Normal appendix.

 

Vascular structures: Normal caliber abdominal aorta. Extensive calcified

atherosclerosis.

 

Retroperitoneum, Lymph nodes: No lymphadenopathy in the abdomen or

pelvis.

 

PELVIS:

Genitourinary system: Bladder is decompressed with a Castellano catheter.

Normal size prostate gland and seminal vesicles.

 

SKELETAL STRUCTURES AND SOFT TISSUES: No fracture or destructive lesions

in the visualized skeleton. Multilevel lumbar spondylosis. Trace amount

of gas within the lumbar spinal canal and adjacent lumbar spine soft

tissues likely relates to same day attempted lumbar puncture. Anasarca.

 







                                        Procedure Note

 

                                        



Brian Buchanan MD - 2021



Formatting of this note might be different from the original.

 

Patient:   REBECCA ANGEL   



Sex#: M                             

#: 1950             [place]#: [id number]

Location: [address] ICU [address]   Accession#: [id number]

 

Ordering Provider:   THOMAS DAS 

Procedure Requested:  [id number] CT ABDOMEN PELVIS WO CONTRAST

Reason for Exam:  trauma

Exam Ordered:        2021  1255

Begin exam date/time:  2021  1732

Exam Date/Time:      2021  1746

 

 

CT ABDOMEN PELVIS WO CONTRAST



INDICATION: Trauma



TECHNIQUE: CT of the abdomen and pelvis without contrast. Coronal and

sagittal reformatted images were performed.



COMPARISON: None.



FINDINGS: Trace abdominopelvic simple ascites. No free air or fluid

collections. Ventriculoperitoneal shunt catheter tubing courses through

the right hemiabdomen.



Lower chest: Please see same day chest CT report for additional

information.



ABDOMEN:

Liver: Cirrhotic morphology of the liver.



Gallbladder and biliary: Hyperdense material within a distended

gallbladder likely relates to small amount of sludge.



Spleen: Normal size spleen.



Pancreas: The pancreas is normal in attenuation without  peripancreatic

inflammatory changes. No pancreatic duct dilation.



Adrenal glands: Normal size adrenal glands.



Kidneys and ureters: Normal size kidneys and ureters. No renal stones.



GI tract, Mesentery: Gastric tube curled within the proximal aspect of

the stomach. The stomach is decompressed.. Normal caliber small bowel.

Normal caliber colon. Moderate colonic stool. Normal appendix.



Vascular structures: Normal caliber abdominal aorta. Extensive calcified

atherosclerosis.



Retroperitoneum, Lymph nodes: No lymphadenopathy in the abdomen or

pelvis.



PELVIS:

Genitourinary system: Bladder is decompressed with a Castellano catheter.

Normal size prostate gland and seminal vesicles.



SKELETAL STRUCTURES AND SOFT TISSUES: No fracture or destructive lesions

in the visualized skeleton. Multilevel lumbar spondylosis. Trace amount

of gas within the lumbar spinal canal and adjacent lumbar spine soft

tissues likely relates to same day attempted lumbar puncture. Anasarca.



IMPRESSION





                                        1. No evidence of acute traumatic or sol

id organ injury within the

abdomen.

                                        2. Hyperdense material within mildly dis

tended gallbladder may relate to

gallbladder sludge or cholelithiasis. Right upper quadrant ultrasound

can be obtained for further evaluation, as clinically indicated.

                                        3. Hepatic cirrhosis. Small volume abdom

inal pelvic ascites.

                                        4. Moderate colonic stool.



ATTESTATION STATEMENT:

The Staff Radiologist has personally reviewed the images and dictated,

reviewed, or edited the final report.



READING SITE: [facility]









   



                 Performing Organization  Address         City/State/ZIP Code  P

kathleen Number

 

   



                                         [facility]   





* CT Cervical Spine wo contrast (2021  5:44 PM CST)



                                        Modality



                           Anatomical Region         Laterality 

 

                                        Computed Tomography



                                         C-spine  











                                         Specimen

 









                                        Impressions

 

                                        



EDWIGE - 2021  6:24 PM CST



1. No acute fracture. Slight anterolisthesis of C2 on C3 with mild

widening of the C2-C3 disc space may relate to patient positioning,

however ligamentous injury would be difficult to exclude given patient

history of trauma. MRI cervical spine is recommended for further

evaluation if clinically indicated.

                                        2. Moderate degenerative cervical spondy

losis.

 

ATTESTATION STATEMENT: The Staff Radiologist has personally reviewed the

images and dictated, reviewed, or edited the final report.

 

READING SITE: [facility]







                                        Narrative

 

                                        



EDWIGE - 2021  6:24 PM CST



 

Patient:  REBECCA ANGEL  

 

Sex#: M               

#: 1950       [place]#: [id number]

Location: [place] [address]  Accession#: [id number]

 

Ordering Provider:  THOMAS DAS 

Procedure Requested: [id number] CT CERVICAL SPINE WO CONTRAST

Reason for Exam: trauma

Exam Ordered:    2021 1255

Begin exam date/time: 2021 1732

Exam Date/Time:   2021 1744

 

 

CT CERVICAL SPINE WO CONTRAST 

 

DATE: 2021 5:45 PM

 

INDICATION: trauma neck pain. Initial encounter

 

TECHNIQUE: Noncontrast CT of the cervical spine was performed. Sagittal

and coronal reformats were performed and evaluated. One or more of the

following dose reduction techniques were utilized: Automated exposure

control (AEC), Adjustment of mA and/or kV according to patient size, Use

of iterative reconstruction technique such as ASiR, CT scan done

according to ALARA and image gently/image wisely

 

COMPARISON: None. 

 

FINDINGS:

 

                                        2 mm anterolisthesis of C2 on C3 with mi

ld widening of the C2-C3 disc

space (series 605, image 22). No acute fracture. No aggressive lytic or

blastic osseous lesions.

 

Moderate multilevel degenerative disc space height loss. Multilevel mild

and moderate spinal canal stenosis secondary to disc protrusions and

marginal osteophytes. Multilevel moderate neuroforaminal narrowing

secondary to uncovertebral arthrosis. Multilevel mild facet arthrosis. 

 

The thyroid gland is atrophic. No cervical lymphadenopathy. Bilateral

carotid atherosclerosis. Gastric tube partially imaged in the esophagus.

 

 

Please see concurrent, separately dictated Chest CT report for further

details. 

 







                                        Procedure Note

 

                                        



Orville Stark MD - 2021



Formatting of this note might be different from the original.

 

Patient:   REBECCA ANGEL   



Sex#: M                             

#: 1950             [place]#: [id number]

Location: [place] [address]   Accession#: [id number]

 

Ordering Provider:   THOMAS DAS 

Procedure Requested:  [id number] CT CERVICAL SPINE WO CONTRAST

Reason for Exam:  trauma

Exam Ordered:        2021  1255

Begin exam date/time:  2021  1732

Exam Date/Time:      2021  1744

 

 

CT CERVICAL SPINE WO CONTRAST 



DATE: 2021 5:45 PM



INDICATION: trauma neck pain. Initial encounter



TECHNIQUE: Noncontrast CT of the cervical spine was performed. Sagittal

and coronal reformats were performed and evaluated. One or more of the

following dose reduction techniques were utilized:  Automated exposure

control (AEC), Adjustment of mA and/or kV according to patient size, Use

of iterative reconstruction technique such as ASiR, CT scan done

according to ALARA and image gently/image wisely



COMPARISON: None. 



FINDINGS:



                                        2 mm anterolisthesis of C2 on C3 with mi

ld widening of the C2-C3 disc

space (series 605, image 22). No acute fracture. No aggressive lytic or

blastic osseous lesions.



Moderate multilevel degenerative disc space height loss. Multilevel mild

and moderate spinal canal stenosis secondary to disc protrusions and

marginal osteophytes. Multilevel moderate neuroforaminal narrowing

secondary to uncovertebral arthrosis. Multilevel mild facet arthrosis.  



The thyroid gland is atrophic. No cervical lymphadenopathy. Bilateral

carotid atherosclerosis. Gastric tube partially imaged in the esophagus.

 



Please see concurrent, separately dictated Chest CT report for further

details.  



IMPRESSION

                                        1. No acute fracture. Slight anterolisth

esis of C2 on C3 with mild

widening of the C2-C3 disc space may relate to patient positioning,

however ligamentous injury would be difficult to exclude given patient

history of trauma. MRI cervical spine is recommended for further

evaluation if clinically indicated.

                                        2. Moderate degenerative cervical spondy

losis.



ATTESTATION STATEMENT: The Staff Radiologist has personally reviewed the

images and dictated, reviewed, or edited the final report.



READING SITE: [facility] Performing Organization  Address         City/State/ZIP Code  P

kathleen Number

 

   



                                         [facility]   





* IR Lumbar puncture w fluoro (2021  5:09 PM CST)



                                        Modality



                           Anatomical Region         Laterality 

 

                                        X-Ray Angiography



                                         L-spine  











                                         Specimen

 









                                        Impressions

 

                                        



EDWIGE - 2021  2:11 PM CST



 

Unable to collect spinal fluid at multiple levels. The spinal needle tip

was confirmed within the thecal sac on AP and lateral on multiple

levels. 

 

 

ATTESTATION STATEMENT: The Staff Physician has personally reviewed the

images and dictated, reviewed, or edited the final report.

 

READING SITE: [facility]







                                        Narrative

 

                                        



EDWIGE - 2021  2:11 PM CST



 

Patient:  REBECCA ANGEL  

 

Sex#: M               

#: 1950       [place]#: [id number]

Location: [address]  Accession#: [id number]

 

Ordering Provider:  ROBERTA TREJO 

Procedure Requested: [id number] IR LUMBAR PUNCTURE W FLUORO

Reason for Exam: encephalopathy of unknown source

Exam Ordered:    2021 1556

Begin exam date/time: 2021 1639

Exam Date/Time:   2021 1709

 

 

IR LUMBAR PUNCTURE W FLUORO 

 

DATE: 2021 7:04 AM 

 

INDICATION: encephalopathy of unknown source. Initial encounter

 

CONSENT: The nature of the procedure as well as its risks, benefits, and

alternatives were discussed with the patient's family by Dr. Cisse. 

Risks specifically discussed included pain, bleeding, infection, spinal

headache, cerebral spinal fluid (CSF) leak, and potential nerve damage. 

The patient's family questions were answered, and both verbal and

written consent was given to proceed.

 

ATTENDING PHYSICIAN: Dr. Stark, the neurointerventional attending

physician, was present for the entire procedure.

 

ASSISTANT: Betito

 

TECHNIQUE: A "time out" procedure was performed verifying the patient's

identity and procedure to be performed. The L2-3, L3-L4, and L4-L5 were

level was localized with fluoroscopy. The skin overlying this level was

then sterilely prepped, draped, and infiltrated with 1% lidocaine for

local anesthesia. Under fluoroscopic guidance, a 20 gauge 3.5 inch

spinal needle was inserted into the thecal sac at those levels. The

needle tip position within the thecal sac was confirmed and AP and

lateral. 

 

FLUORO TIME: 1.1 minutes

 

RADIATION EXPOSURE: Air Kerma (Ka,r) 11.9 mGy

 

FINDINGS:

 

Unable to collect spinal fluid at multiple levels. The spinal needle tip

was confirmed within the thecal sac on AP and lateral on multiple

levels. 

 







                                        Procedure Note

 

                                        



Orville Stark MD - 2021



Formatting of this note might be different from the original.

 

Patient:   REBECCA ANGEL   



Sex#: M                             

#: 1950             [place]#: [id number]

Location: [address]   Accession#: [id number]

 

Ordering Provider:   ROBERTA TREJO 

Procedure Requested:  [id number] IR LUMBAR PUNCTURE W FLUORO

Reason for Exam:  encephalopathy of unknown source

Exam Ordered:        2021  1556

Begin exam date/time:  2021  1639

Exam Date/Time:      2021  1709

 

 

IR LUMBAR PUNCTURE W FLUORO  



DATE:  2021 7:04 AM 



INDICATION: encephalopathy of unknown source. Initial encounter



CONSENT: The nature of the procedure as well as its risks, benefits, and

alternatives were discussed with the patient's family by Dr. Cisse. 

Risks specifically discussed included pain, bleeding, infection, spinal

headache, cerebral spinal fluid (CSF) leak, and potential nerve damage. 

The patient's family questions were answered, and both verbal and

written consent was given to proceed.



ATTENDING PHYSICIAN: Dr. Stark, the neurointerventional attending

physician, was present for the entire procedure.



ASSISTANT: Betito



TECHNIQUE:  A "time out" procedure was performed verifying the patient's

identity and procedure to be performed.  The L2-3, L3-L4, and L4-L5 were

level was localized with fluoroscopy. The skin overlying this level was

then sterilely prepped, draped, and infiltrated with 1% lidocaine for

local anesthesia. Under fluoroscopic guidance, a 20 gauge 3.5 inch

spinal  needle was inserted into the thecal sac at those levels. The

needle tip position within the thecal sac was confirmed and AP and

lateral. 



FLUORO TIME: 1.1 minutes



RADIATION EXPOSURE: Air Kerma (Ka,r) 11.9 mGy



FINDINGS:



Unable to collect spinal fluid at multiple levels. The spinal needle tip

was confirmed within the thecal sac on AP and lateral on multiple

levels. 



IMPRESSION



Unable to collect spinal fluid at multiple levels. The spinal needle tip

was confirmed within the thecal sac on AP and lateral on multiple

levels. 





ATTESTATION STATEMENT: The Staff Physician has personally reviewed the

images and dictated, reviewed, or edited the final report.



READING SITE: [facility]







   



                 Performing Organization  Address         City/State/ZIP Code  P

kathleen Number

 

   



                                         [facility]   





* EEG Prolonged (> 60 Min) (2021  2:52 PM CST)



                                        Modality



                           Anatomical Region         Laterality 

 

                                        Other











                                         Specimen

 









                                        Narrative

 

                                        



[facility] NEURO - 2021  4:06 PM CST



Sebastian Sorto MD   2021 4:15 PM

NEURODIAGNOSTICS - EEG Report

 

DATE OF STUDY: 2021

 

TYPE OF EEG: Prolonged

 

LENGTH OF EE min

 

REFERRING PROVIDER: 

GIA Hernandez

 

INTERPRETING PHYSICIAN: 

Sebastian Sorto M.D., M.S.

 

INDICATION: 

Evaluate for seizures.

 

MEDICATIONS:



 cefTRIAXone (ROCEPHIN) injection 2 g 



 dexmedeTOMIDINE (PRECEDEX) 4 mcg/mL infusion 100 mL (premix) 



 dextrose 50% (D50W) syringe 25-50 mL 



 famotidine (PEPCID) tablet 20 mg 

 Or 



 famotidine (PEPCID) injection 20 mg 



 glucagon (GLUCAGEN) injection 1 mg 

 Or 



 glucagon (GLUCAGEN) injection 1 mg 



 glucose chewable tablet 16-32 g 



 hydrALAZINE (APRESOLINE) injection 10-20 mg 



 ipratropium-albuteroL (DUO-NEB) 0.5-3 mg/3 mL nebulizer 

solution 3 mL 



 labetaloL (NORMODYNE,TRANDATE) injection 10-20 mg 



 lidocaine (pf) (XYLOCAINE-MPF) 20 mg/mL (2 %) injection 1-10 mL 

 



 methylPREDNISolone sod suc(PF) (SOLU-Medrol) injection 40 mg 



 ondansetron (ZOFRAN) injection 4 mg 



 prochlorperazine (COMPAZINE) injection 2.5-5 mg 

 Or 



 prochlorperazine (COMPAZINE) injection 2.5-5 mg 

 Or 



 prochlorperazine (COMPAZINE) suppository 25 mg 



 senna-docusate (PERICOLACE) 8.6-50 mg 1 tablet 



 sodium chloride 0.9% (NS) IV Bolus 



 sodium chloride 0.9% (NS) IV Bolus 



 sodium chloride 0.9% infusion 

 

 

 

FINDINGS: 

 

ABNORMALITY #1:

Background rhythm shows generalized delta frequency slowing in a 

                                        1 to 4 Hz frequency range. Intermixed 

faster theta frequency 

components can occur more prominently over the central regions 

that at times can have poorly formed sleep morphologies.

 

BACKGROUND: 

As above.

 

HYPERVENTILATION: 

Not performed.

 

INTERMITTENT PHOTIC STIMULATION: 

Not performed.

 

SLEEP: 

Normal transitions of wakefulness and sleep are not identified.

 

CARDIAC MONITOR: 

Shows a normal sinus rhythm. 

 

IMPRESSION: 

This is an abnormal prolonged EEG due to the presence of 

generalized slowing throughout the recording. This finding 

suggest midline subcortical dysfunction likely corresponding to 

encephalopathy. No clear epileptiform activity is identified.

 

 







   



                 Performing Organization  Address         City/State/ZIP Code  P

kathleen Number

 

   



                                         [facility] NEURO   





* CT Thoracic Spine reconstructed (2021  1:06 PM CST)



                                        Modality



                           Anatomical Region         Laterality 

 

                                        Computed Tomography



                                         T-spine  











                                         Specimen

 









                                        Impressions

 

                                        



LEOHERNESTO - 2021  5:44 PM CST



Impression:

 

Mild to moderate multilevel degenerative thoracic spondylosis.

 

READING SITE: [facility].

 

ATTESTATION STATEMENT:

The Staff Radiologist has personally reviewed the images and dictated,

reviewed, or edited the final report.

 







                                        Narrative

 

                                        



EDWIGE - 2021  5:44 PM CST



 

Patient:  REBECCA ANGEL  

 

Sex#: M               

#: 1950       [place]#: [id number]

Location: [address] ICU [address]-  Accession#: [id number]

 

Ordering Provider:  THOMAS DAS 

Procedure Requested: [id number] CT THORACIC SPINE RECONSTRUCTED

Reason for Exam: trauma

Exam Ordered:    2021 1256

Begin exam date/time: 2021 1305

Exam Date/Time:   2021 1306

 

 

CT THORACIC SPINE RECONSTRUCTED 

 

                                        2021 1:07 PM 

 

Indication: Trauma, pt transfer from [facility]. Found on floor today

by neighbor. Dx with SDH and subacute stroke. pt is A&Ox1 initial

encounter

 

Comparison: Concurrent CT chest.

 

Technique: CT imaging of the thoracic spine was reconstructed from

concurrent CT chest. Coronal and sagittal reformatted images were

performed. One or more of the following dose reduction techniques were

utilized: Automated exposure control (AEC), Adjustment of mA and/or kV

according to patient size, Use of iterative reconstruction technique

such as ASiR, CT scan done according to ALARA and image gently/image

wisely.

 

Findings:

Trace anterolisthesis of T9 on T10 and T10 on T11. Trace retrolisthesis

of T6 on T7. The normal thoracic kyphosis is maintained. No acute

fracture. Vertebral body heights are maintained without compression

deformity. Multilevel mild to moderate degenerative disc disease with

marginal osteophytes and multilevel vacuum disc phenomenon. Ligamentum

flavum calcification at T10-T11. No aggressive lytic or blastic osseous

lesion.

 

No significant spinal canal stenosis or neural foraminal narrowing.

 

Please refer to the separately dictated report for findings within the

chest. 

 







                                        Procedure Note

 

                                        



Orville Stark MD - 2021



Formatting of this note might be different from the original.

 

Patient:   REBECCA ANGEL   



Sex#: M                             

#: 1950             [place]#: [id number]

Location: [address]   Accession#: [id number]

 

Ordering Provider:   THOMAS DAS 

Procedure Requested:  [id number] CT THORACIC SPINE RECONSTRUCTED

Reason for Exam:  trauma

Exam Ordered:        2021  1256

Begin exam date/time:  2021  1305

Exam Date/Time:      2021  1306

 

 

CT THORACIC SPINE RECONSTRUCTED 



                                        2021 1:07 PM 



Indication:  Trauma, pt transfer from [facility] [place]. Found on floor today

by neighbor. Dx with SDH and subacute stroke. pt is A&Ox1 initial

encounter



Comparison: Concurrent CT chest.



Technique:  CT imaging of the thoracic spine was reconstructed from

concurrent CT chest. Coronal and sagittal reformatted images were

performed. One or more of the following dose reduction techniques were

utilized: Automated exposure control (AEC), Adjustment of mA and/or kV

according to patient size, Use of iterative reconstruction technique

such as ASiR, CT scan done according to ALARA and image gently/image

wisely.



Findings:

Trace anterolisthesis of T9 on T10 and T10 on T11. Trace retrolisthesis

of T6 on T7. The normal thoracic kyphosis is maintained. No acute

fracture. Vertebral body heights are maintained without compression

deformity. Multilevel mild to moderate degenerative disc disease with

marginal osteophytes and multilevel vacuum disc phenomenon. Ligamentum

flavum calcification at T10-T11. No aggressive lytic or blastic osseous

lesion.



No significant spinal canal stenosis or neural foraminal narrowing.



Please refer to the separately dictated report for findings within the

chest. 



IMPRESSION

Impression:



Mild to moderate multilevel degenerative thoracic spondylosis.



READING SITE: [facility].



ATTESTATION STATEMENT:

The Staff Radiologist has personally reviewed the images and dictated,

reviewed, or edited the final report.









   



                 Performing Organization  Address         City/State/ZIP Code  P

kathleen Number

 

   



                                         [facility]   





* Clotting Screen (2021 12:00 PM CST)



Only the most recent of 2 results within the time period is included.



    



              Component    Value        Ref Range    Performed At  Pathologist



                                         Signature

 

    



                 Protime         14.3            11.4 - 15.0 Sec  [facility] 

 

    



                 INR             1.1             0.8 - 1.2       [facility] 

 

    



                 APTT            30              22 - 34 Sec     [facility] 











                                         Specimen

 





                                         Blood - Venous







   



                 Performing Organization  Address         City/[facility]/ZIP Code  P

kathleen Number

 

   



                     [facility]                [address] [address]

11 





* Troponin-I HS Single (2021 11:38 AM CST)



Only the most recent of 2 results within the time period is included.



    



              Component    Value        Ref Range    Performed At  Pathologist



                                         Signature

 

    



                 Troponin I, 0HR  302 (HH)        <3-53 pg/mL pg/mL  [facility] 



                                         HS    











                                         Specimen

 





                                         Blood - Venous







                                        Narrative

 

                                        



[facility] - 2021 12:47 PM CST



[facility] converted from Troponin I ([facility]) to 
High-Sensitivity Troponin I ([facility]) on 2021.







   



                 Performing Organization  Address         City/[facility]/ZIP Code  P

kathleen Number

 

   



                     [facility]                [address] [address]

11 





* ECHO COMPLETE W DOPPLER AND COLOR FLOW (2021 11:13 AM CST)



    



              Component    Value        Ref Range    Performed At  Pathologist



                                         Signature

 

    



                 Ejection        60              %               [facility] 



                                         Fraction    







                                        Modality



                           Anatomical Region         Laterality 

 

                                        Ultrasound



                                         Chest  











                                         Specimen

 









                                        Impressions

 

                                        



[facility] - 2021 12:39 PM CST



 1. Normal left ventricular systolic function, with an estimated ejection 
fraction of 60%. 

 2. Normal right ventricular size and systolic function. 

 3. No significant valvular abnormalities. 

 No previous study available for comparison. 

 

 Dr Johnny Benavidez

 (Electronically Signed)

 Final Date:  2021 12:38







                                        Narrative

 

                                        



[facility] - 2021 12:39 PM CST



This result has an attachment that is not available.



 

 

                     ECHOCARDIOGRAM 
REPORT

  [facility]

 

 Name: REBECCA ANGEL         Date:   
2021 10:50         Chart #: [id number]

 :  1950              Location: [facility]     Sono:   npfeffer

 Age:  71     Gender:  M       Referring: GRISELDA PANCHAL        Room #:  N306

                           
                  Fellow:

 Indication:elevated

 

 

 Procedure: ECHO COMPLETE W DOPPLER AND COLOR FLOW

 BP: 119  / 75   HR: 84     Ht: 66       Wt: 
161     BSA  1.8

                           
           :

 2D ECHO MEASUREMENTS

 LV Diastolic Diameter Bas 4.5 cm           IVS Diastolic 
Thickness  1 cm    

 LV Systolic Diameter Base 2.9 cm           LVPW Diastolic
Thickness  0.9 cm   

 LA Systolic Diameter LX  3.3 cm           Aorta at 
Sinuses Diameter 3.3 cm   

 

 AORTIC VALVE DOPPLER

 AV Peak Velocity      166 cm/s          LVOT AV Thierry
Ratio     0.48    

 AV Peak Gradient      11 mmHg   

 

 MITRAL VALVE DOPPLER

 Mitral E Point Velocity  107 cm/s          Mitral E to A 
Ratio    0.81    

 Mitral A Point Velocity  132 cm/s          MV 
Deceleration Time    194 ms   

 

 

 

 

 

 

 

 

 

 

WALL SEGMENT ANALYSIS: ROUTINE

 

 LVSI : 1  %FM : 100

 LAD : 1  LCX : 1  RCA : 1

 

 

 

 

 

 

 FINDINGS                   LV Ejection 
Fraction: 60

 Very technically difficult study.

 Normal left ventricular systolic function, with an estimated ejection 
fraction of 60%. 

 Normal wall thickness. 

 Normal wall motion. 

 Normal left ventricular dimensions. 

 Normal right ventricular size and systolic function. 

 Normal right and left atrial size. 

 Indeterminate diastolic function, at rest. 

 Sclerotic aortic valve without regurgitation. 

 Mitral annular calcification with trivial regurgitation. 

 Pulmonic valve not well visualized.

 Normal tricuspid valve with trivial regurgitation. Unable to accurately 
estimate pulmonary artery pressure. 

 No pericardial effusion. 

 IVC is dilated and not responsive to inspiration indicating markedly elevated
RA pressure. 

 Ascending aorta not well visualized. 

 No obvious intracardiac masses or thrombi. 







                                        Procedure Note

 

                                        



Johnny Benavidez MD - 2021



Formatting of this note might be different from the original.





                                         ECHOCARDIOGRAM REPORT

   [facility]



  Name:  REBECCA ANGEL                  Date:      2021 10:50       
         Chart #:  [id number]

  :   1950                           Location:  [facility]
         Sono:     npfeffer

  Age:   71         Gender:    M              Referring: GRISELDA PANCHAL     
         Room #:   N306

                                                                                
         Fellow:

  Indication:elevated





  Procedure: ECHO COMPLETE W DOPPLER AND COLOR FLOW

  BP: 119   / 75      HR:  84          Ht: 66             Wt:  161          BSA 
 1.8

                                                                            :

 2D ECHO MEASUREMENTS

 LV Diastolic Diameter Bas  4.5 cm                     IVS Diastolic Thickness  
 1 cm        

 LV Systolic Diameter Base  2.9 cm                     LVPW Diastolic Thickness 
 0.9 cm      

 LA Systolic Diameter LX    3.3 cm                     Aorta at Sinuses Diameter
 3.3 cm      



 AORTIC VALVE DOPPLER

 AV Peak Velocity           166 cm/s                   LVOT AV Thierry Ratio        
 0.48        

 AV Peak Gradient           11 mmHg     



 MITRAL VALVE DOPPLER

 Mitral E Point Velocity    107 cm/s                   Mitral E to A Ratio      
 0.81        

 Mitral  A Point Velocity   132 cm/s                   MV Deceleration Time     
 194 ms      





















WALL SEGMENT ANALYSIS: ROUTINE



  LVSI : 1    %FM :  100

  LAD  : 1    LCX : 1    RCA : 1













  FINDINGS                                     LV Ejection Fraction: 60

  Very technically difficult study.

  Normal left ventricular systolic function, with an estimated ejection fraction
of 60%. 

  Normal wall thickness. 

  Normal wall motion. 

  Normal left ventricular dimensions. 

  Normal right ventricular size and systolic function. 

  Normal right and left atrial size. 

  Indeterminate diastolic function, at rest. 

  Sclerotic aortic valve without regurgitation. 

  Mitral annular calcification with trivial regurgitation. 

  Pulmonic valve not well visualized.

  Normal tricuspid valve with trivial regurgitation.  Unable to accurately 
estimate pulmonary artery pressure. 

  No pericardial effusion. 

  IVC is dilated and not responsive to inspiration indicating markedly elevated 
RA pressure. 

  Ascending aorta not well visualized. 

  No obvious intracardiac masses or thrombi. 

IMPRESSION

  1. Normal left ventricular systolic function, with an estimated ejection 
fraction of 60%. 

  2. Normal right ventricular size and systolic function. 

  3. No significant valvular abnormalities. 

  No previous study available for comparison. 



  Dr Johnny Benavidez

  (Electronically Signed)

  Final Date:    2021 12:38







   



                 Performing Organization  Address         City/State/ZIP Code  P

kathleen Number

 

   



                                         [facility]   





* CT Chest wo contrast (2021 10:35 AM CST)



                                        Modality



                           Anatomical Region         Laterality 

 

                                        Computed Tomography



                                         Lung, Chest  











                                         Specimen

 









                                        Impressions

 

                                        



EDWIGE - 2021 11:18 AM CST



1. Diffuse interstitial edema.

                                        2. Posterior relaxation and scattered 

subsegmental atelectasis. No

pulmonary mass or confluent consolidation.

                                        3. Cardiomegaly. Heavy coronary artery

 calcifications. Aortic valve

leaflet calcifications could represent calcific aortic stenosis.

                                        4. Small bilateral pleural effusions w

ith features of partial

loculation on the right. No pneumothorax.

                                        5. Small upper abdominal ascites.

 

READING SITE: [facility] - 2021 11:18 AM CST



 

Patient:  REBECCA ANGEL  

 

Sex#: M               

#: 1950       [place]#: [id number]

Location: [address] ICU [address]01  Accession#: [id number]

 

Ordering Provider:  GRISELDA PANCHAL

Procedure Requested: [id number] CT CHEST WO CONTRAST

Reason for Exam: pneumothorax

Exam Ordered:    2021 1016

Begin exam date/time: 2021 1028

Exam Date/Time:   2021 1035

 

 

CT CHEST WO CONTRAST 

 

INDICATION: pneumothorax.

 

COMPARISON STUDY: Portable chest dated 2021.

 

TECHNIQUE: Unenhanced axial images were obtained through the lungs and

upper abdomen. Coronal MIP images and coronal and sagittal multiplanar

reconstructions were also obtained.

 

FINDINGS: Image quality degraded by motion.

 

Life Support Devices: Gastric tube courses into the stomach. Partially

imaged shunt catheter courses along the anterior chest wall and into the

abdomen.

 

Lungs and Airways: Apical paraseptal emphysema and bullous disease. Mild

to moderate upper lobe predominant centrilobular emphysema. Diffuse

right greater than left interlobular septal thickening. Right greater

than left posterior relaxation atelectasis. Normal central airways.

Diffuse bronchial wall thickening. Peripheral endoluminal plugs.

 

Pleura: Small right greater than left bilateral pleural effusions with

features of partial loculation on the right. No pneumothorax.

 

Heart and Mediastinum: Atrophic thyroid. No axillary or supraclavicular

lymphadenopathy. No mediastinal, hilar or retrocrural lymphadenopathy.

Cardiomegaly. No pericardial effusion. Heavy coronary artery

calcifications. Aortic valve leaflet calcifications. Atherosclerosis of

the thoracic aorta and branch vessels. Gas in the pulmonary trunk is

likely iatrogenic. Small hiatus hernia. Patulous esophagus with mural

thickening.

 

Abdomen: Small upper abdominal ascites. Atherosclerosis of the abdominal

aorta and branch vessels.

 

Bones and Soft Tissues: Thoracic spondylosis. Diffuse body wall edema.

 







                                        Procedure Note

 

                                        



Christi Hernandez MD - 2021



Formatting of this note might be different from the original.

 

Patient:   REBECCA ANGEL   



Sex#: M                             

#: 1950             [place]#: [id number]

Location: [address] ICU [address]01   Accession#: [id number]

 

Ordering Provider:   GRISELDA PANCHAL

Procedure Requested:  [id number] CT CHEST WO CONTRAST

Reason for Exam:  pneumothorax

Exam Ordered:        2021  1016

Begin exam date/time:  2021  1028

Exam Date/Time:      2021  1035

 

 

CT CHEST WO CONTRAST 



INDICATION: pneumothorax.



COMPARISON STUDY: Portable chest dated 2021.



TECHNIQUE:  Unenhanced axial images were obtained through the lungs and

upper abdomen. Coronal MIP images and coronal and sagittal multiplanar

reconstructions were also obtained.



FINDINGS: Image quality degraded by motion.



Life Support Devices: Gastric tube courses into the stomach. Partially

imaged shunt catheter courses along the anterior chest wall and into the

abdomen.



Lungs and Airways: Apical paraseptal emphysema and bullous disease. Mild

to moderate upper lobe predominant centrilobular emphysema. Diffuse

right greater than left interlobular septal thickening. Right greater

than left posterior relaxation atelectasis. Normal central airways.

Diffuse bronchial wall thickening. Peripheral endoluminal plugs.



Pleura: Small right greater than left bilateral pleural effusions with

features of partial loculation on the right. No pneumothorax.



Heart and Mediastinum: Atrophic thyroid. No axillary or supraclavicular

lymphadenopathy. No mediastinal, hilar or retrocrural lymphadenopathy.

Cardiomegaly. No pericardial effusion. Heavy coronary artery

calcifications. Aortic valve leaflet calcifications. Atherosclerosis of

the thoracic aorta and branch vessels. Gas in the pulmonary trunk is

likely iatrogenic. Small hiatus hernia. Patulous esophagus with mural

thickening.



Abdomen: Small upper abdominal ascites. Atherosclerosis of the abdominal

aorta and branch vessels.



Bones and Soft Tissues: Thoracic spondylosis. Diffuse body wall edema.



IMPRESSION

                                        1.  Diffuse interstitial edema.

                                        2.  Posterior relaxation and scattered s

ubsegmental atelectasis. No

pulmonary mass or confluent consolidation.

                                        3.  Cardiomegaly. Heavy coronary artery 

calcifications. Aortic valve

leaflet calcifications could represent calcific aortic stenosis.

                                        4.  Small bilateral pleural effusions wi

th features of partial

loculation on the right. No pneumothorax.

                                        5.  Small upper abdominal ascites.



READING SITE: [facility] Performing Organization  Address         City/State/ZIP Code  P

kathleen Number

 

   



                                         [facility]   





* XR Abdomen single view AP (2021  9:37 AM CST)



                                        Modality



                           Anatomical Region         Laterality 

 

                                        Computed Radiography



                                         Abdomen  











                                         Specimen

 









                                        Impressions

 

                                        



EDWIGE - 2021 11:01 AM CST



1. Enteric tube coiled in the proximal stomach with sidehole near the

cardia and tip near the GE junction.

                                        2. Nonobstructive bowel gas pattern.

                                        3. Heterogeneous opacities in the righ

t midlung zone. Recommend

attention on ordered follow-up CT.

 

COMMUNICATION: Finding of tube position was verbally communicated and

acknowledged via telephone to Angie Boles RN, at 2021 10:50 AM.

 

ATTESTATION STATEMENT: Staff radiologist has personally reviewed the

images and dictated, reviewed and/or edited the final report.

 

READING SITE: [facility]

 







                                        Narrative

 

                                        



[facility] - 2021 11:01 AM CST



 

Patient:  REBECCA ANGEL  

 

Sex#: M               

#: 1950       [place]#: [id number]

Location: [address] [address]  Accession#: [id number]

 

Ordering Provider:  GRISELDA PANCHAL

Procedure Requested: [id number] XR ABDOMEN SINGLE VIEW AP

Reason for Exam: NGT placement

Exam Ordered:    2021 0929

Begin exam date/time: 2021 0932

Exam Date/Time:   2021 0937

 

 

XR ABDOMEN SINGLE VIEW AP 

 

INDICATION: NGT placement.

 

COMPARISON: None.

 

TECHNIQUE: Supine abdomen.

 

FINDINGS: 

 

Support Devices: Enteric tube coiled in the proximal stomach with

sidehole near the cardia and tip near the GE junction.

 

Abdomen: Nonobstructive bowel gas pattern. No free air on this limited

supine image.

 

Lower Chest: Heterogeneous opacities in the right midlung zone.

 

Skeletal Structures and Soft Tissues: No acute osseous abnormality.

Normal soft tissues.

 







                                        Procedure Note

 

                                        



Vick Camara DO - 2021



Formatting of this note might be different from the original.

 

Patient:   REBECCA ANGEL   



Sex#: M                             

#: 1950             [place]#: [id number]

Location: [place] [address]   Accession#: [id number]

 

Ordering Provider:   GRISELDA PANCHAL

Procedure Requested:  [id number] XR ABDOMEN SINGLE VIEW AP

Reason for Exam:  NGT placement

Exam Ordered:        2021  0929

Begin exam date/time:  2021  0932

Exam Date/Time:      2021  0937

 

 

XR ABDOMEN SINGLE VIEW AP 



INDICATION: NGT placement.



COMPARISON: None.



TECHNIQUE: Supine abdomen.



FINDINGS: 



Support Devices: Enteric tube coiled in the proximal stomach with

sidehole near the cardia and tip near the GE junction.



Abdomen: Nonobstructive bowel gas pattern. No free air on this limited

supine image.



Lower Chest: Heterogeneous opacities in the right midlung zone.



Skeletal Structures and Soft Tissues: No acute osseous abnormality.

Normal soft tissues.



IMPRESSION

                                        1.  Enteric tube coiled in the proximal 

stomach with sidehole near the

cardia and tip near the GE junction.

                                        2.  Nonobstructive bowel gas pattern.

                                        3.  Heterogeneous opacities in the right

 midlung zone. Recommend

attention on ordered follow-up CT.



COMMUNICATION: Finding of tube position was verbally communicated and

acknowledged via telephone to Angie Boles RN, at 2021 10:50 AM.



ATTESTATION STATEMENT: Staff radiologist has personally reviewed the

images and dictated, reviewed and/or edited the final report.



READING SITE: [facility]









   



                 Performing Organization  Address         City/State/ZIP Code  P

kathleen Number

 

   



                                         [facility]   





* Green Top (2021  9:29 AM CST)







                                         Specimen

 





                                         Blood - Venous







   



                 Performing Organization  Address         City/[facility]/ZIP Code  P

kathleen Number

 

   



                     [facility]                [address], 

11 





* XR Chest single view frontal (2021  8:56 AM CST)



Only the most recent of 2 results within the time period is included.



                                        Modality



                           Anatomical Region         Laterality 

 

                                        Computed Radiography



                                         Chest  











                                         Specimen

 









                                        Impressions

 

                                        



EDWIGE - 2021 10:01 AM CST



 

                                        1. Abnormal curvilinear lucency at the l

eft lateral lung base and

costophrenic angle with apparent deep sulcus. Although no definitive

pleural line is noted, findings are equivocal for a small pneumothorax

given history of trauma. Contrast-enhanced chest CT is recommended for

further evaluation.

 

                                        2. No focal airspace disease.

 

Results were communicated by telephone to patient's nurse Angie by Dr. Wali Garrett on 2021 at 9:56 AM.

 

READING SITE: [facility]

 

ATTESTATION STATEMENT:

The Staff Radiologist has personally reviewed this study and agrees with

the findings in this report.







                                        Narrative

 

                                        



EDWIGE - 2021 10:01 AM CST



 

Patient:  REBECCA ANGEL  

 

Sex#: M               

#: 1950       [place]#: [id number]

Location: [place] [address]  Accession#: [id number]

 

Ordering Provider:  ROBERTA TREJO 

Procedure Requested: [id number] XR CHEST SINGLE VIEW FRONTAL

Reason for Exam: COPD

Exam Ordered:    2021 0837

Begin exam date/time: 2021 0840

Exam Date/Time:   2021 0856

 

 

XR CHEST SINGLE VIEW FRONTAL 

 

INDICATION: COPD. Trauma.

 

COMPARISON STUDY: Portable chest radiograph 2021.

 

FINDINGS:

 

Life support devices: Ventriculoperitoneal shunt catheter projects over

the left hemithorax.

 

Lungs: Low lung volumes.. No focal airspace disease. Normal pulmonary

vasculature.

 

Pleura: Curvilinear lucency at the left lateral lung base and

costophrenic angle with appearance of a deep sulcus. No definitive

pleural line is noted. No pleural effusion.

 

Heart and Mediastinum: Stable cardiomediastinal silhouette and great

vessels. 

 

Bones and Soft Tissues: Stable regional skeleton and soft tissues.

 







                                        Procedure Note

 

                                        



Se Morejon MD - 2021



Formatting of this note might be different from the original.

 

Patient:   REBECCA ANGEL   



Sex#: M                             

#: 1950             [place]#: [id number]

Location: [place] [address]   Accession#: [id number]

 

Ordering Provider:   ROBERTA TREJO 

Procedure Requested:  [id number] XR CHEST SINGLE VIEW FRONTAL

Reason for Exam:  COPD

Exam Ordered:        2021  0837

Begin exam date/time:  2021  0840

Exam Date/Time:      2021  0856

 

 

XR CHEST SINGLE VIEW FRONTAL 



INDICATION: COPD. Trauma.



COMPARISON STUDY: Portable chest radiograph 2021.



FINDINGS:



Life support devices: Ventriculoperitoneal shunt catheter projects over

the left hemithorax.



Lungs: Low lung volumes.. No focal airspace disease. Normal pulmonary

vasculature.



Pleura: Curvilinear lucency at the left lateral lung base and

costophrenic angle with appearance of a deep sulcus. No definitive

pleural line is noted. No pleural effusion.



Heart and Mediastinum: Stable cardiomediastinal silhouette and great

vessels. 



Bones and Soft Tissues: Stable regional skeleton and soft tissues.



IMPRESSION



                                        1. Abnormal curvilinear lucency at the l

eft lateral lung base and

costophrenic angle with apparent deep sulcus. Although no definitive

pleural line is noted, findings are equivocal for a small pneumothorax

given history of trauma. Contrast-enhanced chest CT is recommended for

further evaluation.



                                        2. No focal airspace disease.



Results were communicated by telephone to patient's nurse Angie by Dr. Wali Garrett on 2021 at 9:56 AM.



READING SITE: [facility]



ATTESTATION STATEMENT:

The Staff Radiologist has personally reviewed this study and agrees with

the findings in this report.







   



                 Performing Organization  Address         City/State/ZIP Code  P

kathleen Number

 

   



                                         [facility]   





* Electrocardiogram (ECG) (2021  6:29 AM CST)



Only the most recent of 3 results within the time period is included.



    



              Component    Value        Ref Range    Performed At  Pathologist



                                         Signature

 

    



                     QRSd                80                  [facility] 

 

    



                     QT                  332                 [facility] 

 

    



                     QTC                 443                 [facility] 

 

    



                     ECGHR               107                 [facility] 

 

    



                     ECGPR               131                 [facility] 











                                         Specimen

 









                                        Narrative

 

                                        



[facility] - 2021  9:09 AM CST



This result has an attachment that is not available.



            [facility]

                    

                    Test Date:  
2021

[facility]   [facility]    Department[facility]  [facility]

Patient ID:  [id number]         Room:     [facility]

Gender:    Male           Technician:  [id number]

:     1950        Requested By: GRISELDA DYER

Order Number: [id number]        Reading MD:  Dylon Schaeffer

                 Measurements

Intervals               Axis     

Rate:     107           P:      61

WI:      131           QRS:     20

QRSD:     80            T:      93

QT:      332                  

QTc:     443                  

              Interpretive Statements

SINUS TACHYCARDIA

BASELINE ARTIFACT

Electronically Signed On 2021 9:09:52 CST by Dylon Schaeffer







                                        Procedure Note

 

                                        



Dylon Schaeffer MD - 2021



Formatting of this note might be different from the original.

                        [facility]

                                       

                                       Test Date:    2021

Pat Name:     [facility]       Department:   [facility]

Patient ID:   [id number]                 Room:         06

Gender:       Male                     Technician:   [id number]

:          1950               Requested By: GRISELDA DYER

Order Number: [id number]                Reading MD:   Dylon Schaeffer

                                 Measurements

Intervals                              Axis          

Rate:         107                      P:            61

WI:           131                      QRS:          20

QRSD:         80                       T:            93

QT:           332                                    

QTc:          443                                    

                           Interpretive Statements

SINUS TACHYCARDIA

BASELINE ARTIFACT

Electronically Signed On 2021 9:09:52 CST by Dylon Schaeffer







   



                 Performing Organization  Address         City/[facility]/ZIP Code  P

kathleen Number

 

   



                                         [facility]   





* Troponin-I HS 6HR (2021  5:14 AM CST)



    



              Component    Value        Ref Range    Performed At  Pathologist



                                         Signature

 

    



                 Troponin I, 6HR  316 (HH)        <3-53 pg/mL pg/mL  [facility] 



                                         HS    

 

    



                 Delta 6HR Trop  145 (HH)        -8-<8           [facility] 











                                         Specimen

 





                                         Blood - Venous







                                        Narrative

 

                                        



[facility] - 2021  6:17 AM CST



[facility] converted from Troponin I ([employer] [employer] [facility]) to 
High-Sensitivity Troponin I ([employer]) on 2021.







   



                 Performing Organization  Address         City/[facility] Code  P

kathleen Number

 

   



                     [facility]                [address] [place] [address]

11 





* Hepatic Function Panel (2021  5:14 AM CST)



    



              Component    Value        Ref Range    Performed At  Pathologist



                                         Signature

 

    



                 Protein Total   6.0             5.7 - 8.2 g/dL  [facility] 



                                         Serum    

 

    



                 Albumin         3.9             3.5 - 5.0 g/dL  [facility] 

 

    



                 Alkaline        63Comment: Reference range  46 - 116 U/L    [facility]

L 



                           Phosphatase               updated 10/24/21 with [facility] 

  



                                         conversion to [employer]   



                                         instrumentation.   

 

    



                 Alanine         146 (H)         0 - 49 U/L      [facility] 



                                         [employer]    



                                         e    

 

    



                 Aspartate       245 (H)Comment: Reference  0 - 34 U/L      [facility]

 



                           Aminotransferas           range updated 10/24/21 with

   



                           e                         [facility] conversion to [employer] 

  



                                         [employer] instrumentation.   

 

    



                 Bilirubin       0.30            0.00 - 0.30 mg/dL  [facility] 



                                         Direct    

 

    



                 Bilirubin Total  0.50            0.20 - 1.10 mg/dL  [facility] 











                                         Specimen

 





                                         Blood - Venous







   



                 Performing Organization  Address         City/[facility] Code  P

kathleen Number

 

   



                     [facility]                [address] [place] [address]

11 





* CT Head wo contrast (2021  4:59 AM CST)



Only the most recent of 2 results within the time period is included.



                                        Modality



                           Anatomical Region         Laterality 

 

                                        Computed Tomography



                                         Head  











                                         Specimen

 









                                        Impressions

 

                                        



EDWIGE - 2021  3:48 PM CST



Impression:

 

                                        1. Stable mixed attenuation left holohem

ispheric extra-axial collection

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

                                        5 mm in maximal thickness. No new or enl

arging intracranial hemorrhage.

No significant mass effect or discrete midline shift. Similar

ventriculomegaly.

                                        2. Stable gray-white loss involving the 

right frontotemporal lobes and

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

 

                                        3. Stable right parietal approach ventri

cular shunt catheter. 

                                        4. Redemonstrated postprocedural changes

 of prior aneurysm coiling are

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

given location.

 

 

ATTESTATION STATEMENT: The Staff Radiologist has personally reviewed the

images and dictated, reviewed, or edited the final report.







                                        Narrative

 

                                        



[facility]  3:48 PM CST



 

Patient:  REBECCA ANGEL  

 

Sex#: M               

#: 1950       [place]#: [id number]

Location: [address]  Accession#: [id number]

 

Ordering Provider:  STELLA RENDON 

Procedure Requested: [id number] CT HEAD WO CONTRAST

Reason for Exam: SDH, AMS

Exam Ordered:    2021 0500

Begin exam date/time: 2021 0450

Exam Date/Time:   2021 0459

 

 

CT HEAD WO CONTRAST 

 

Date: 2021 4:59 AM 

 

Clinical Indication: SDH, AMS

 

Comparison: CT head 2021.

 

Technique: 5 mm axial tomographic images were obtained of the head

without contrast. These were viewed on brain and bone windows. One or

more of the following dose reduction techniques were utilized: Automated

exposure control (AEC), Adjustment of mA and/or kV according to patient

size, Use of iterative reconstruction technique such as ASiR, CT scan

done according to ALARA and image gently/image wisely. 

 

Findings:

 

Stable mixed attenuation left holohemispheric extra-axial collection

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

                                        5 mm in maximal thickness (series 2, ruiz

ge 21). No new or enlarging

intracranial hemorrhage. No significant mass effect or discrete midline

shift. Similar ventriculomegaly.

 

Stable right parietal approach ventricular shunt catheter is identified

with the distal intracranial end terminating near the foramen of Cook.

Expected subtle encephalomalacia along the shunt catheter tract.

Redemonstrated additional high right frontal lobe encephalomalacia with

linear calcification projecting towards a round calvarial defect could

represent sequelae of prior, now removed right frontal approach

ventriculostomy catheter.

 

Redemonstrated postprocedural changes of prior aneurysm coiling are

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

given location.

 

Stable gray-white loss involving the right frontotemporal lobes and

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

 

 

Mild generalized cerebral and cerebellar volume loss. Mild nonspecific

periventricular hypoattenuation, most commonly seen with chronic small

vessel ischemic disease. Calcified atherosclerosis of the bilateral

cavernous and paraclinoid internal carotid arteries and intracranial

vertebral arteries.

 

The subarachnoid cisterns are patent. 

 

Mild pansinus mucosal thickening.. The visualized portions of the

orbits and globes are normal. The mastoid air cells are clear. 

 

The  topogram shows no lytic lesion or fracture. 

 







                                        Procedure Note

 

                                        



Onesimo Solano MD - 2021



Formatting of this note might be different from the original.

 

Patient:   REBECCA ANGEL   



Sex#: M                             

#: 1950             [place]#: [id number]

Location: [address]   Accession#: [id number]

 

Ordering Provider:   STELLA RENDON 

Procedure Requested:  [id number] CT HEAD WO CONTRAST

Reason for Exam:  SDH, AMS

Exam Ordered:        2021  0500

Begin exam date/time:  2021  0450

Exam Date/Time:      2021  0459

 

 

CT HEAD WO CONTRAST 



Date: 2021 4:59 AM 



Clinical Indication: SDH, AMS



Comparison: CT head 2021.



Technique:  5 mm axial tomographic images were obtained of the head

without contrast. These were viewed on brain and bone windows. One or

more of the following dose reduction techniques were utilized: Automated

exposure control (AEC), Adjustment of mA and/or kV according to patient

size, Use of iterative reconstruction technique such as ASiR, CT scan

done according to ALARA and image gently/image wisely. 



Findings:



Stable mixed attenuation left holohemispheric extra-axial collection

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

                                        5 mm in maximal thickness (series 2, ruiz

ge 21). No new or enlarging

intracranial hemorrhage. No significant mass effect or discrete midline

shift. Similar ventriculomegaly.



Stable right parietal approach ventricular shunt catheter is identified

with the distal intracranial end terminating near the foramen of Cook.

Expected subtle encephalomalacia along the shunt catheter tract.

Redemonstrated additional high right frontal lobe encephalomalacia with

linear calcification projecting towards a round calvarial defect could

represent sequelae of prior, now removed right frontal approach

ventriculostomy catheter.



Redemonstrated postprocedural changes of prior aneurysm coiling are

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

given location.



Stable gray-white loss involving the right frontotemporal lobes and

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





Mild generalized cerebral and cerebellar volume loss. Mild nonspecific

periventricular hypoattenuation, most commonly seen with chronic small

vessel ischemic disease. Calcified atherosclerosis of the bilateral

cavernous and paraclinoid internal carotid arteries and intracranial

vertebral arteries.



The subarachnoid cisterns are patent. 



Mild pansinus mucosal thickening..  The visualized portions of the

orbits and globes are normal. The mastoid air cells are clear. 



The  topogram shows no lytic lesion or fracture. 



IMPRESSION

Impression:



                                        1. Stable mixed attenuation left holohem

ispheric extra-axial collection

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

                                        5 mm in maximal thickness. No new or enl

arging intracranial hemorrhage.

No significant mass effect or discrete midline shift. Similar

ventriculomegaly.

                                        2. Stable gray-white loss involving the 

right frontotemporal lobes and

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



                                        3. Stable right parietal approach ventri

cular shunt catheter. 

                                        4. Redemonstrated postprocedural changes

 of prior aneurysm coiling are

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

given location.





ATTESTATION STATEMENT: The Staff Radiologist has personally reviewed the

images and dictated, reviewed, or edited the final report.







   



                 Performing Organization  Address         City/State/ZIP Code  P

kathleen Number

 

   



                                         [facility]   





* Antibody Screen (2021  1:04 AM CST)



    



              Component    Value        Ref Range    Performed At  Pathologist



                                         Signature

 

    



                     Antibody Screen     Negative            [facility] 



                                         [facility] 











                                         Specimen

 





                                         Blood - Venous







   



                 Performing Organization  Address         City/State/ZIP Code  P

kathleen Number

 

   



                     [facility] [address] [place] 

[address] 



                                         BLOOD BANK   





* ABORH Type (2021  1:04 AM CST)



    



              Component    Value        Ref Range    Performed At  Pathologist



                                         Signature

 

    



                     ABORH Type          A Positive          [facility] 



                                         [facility] 











                                         Specimen

 





                                         Blood - Venous







   



                 Performing Organization  Address         City/[facility]/ZIP Code  P

kathleen Number

 

   



                     [facility]  [address] [place] 

[address] 



                                         BLOOD BANK   





* Troponin-I HS 2HR (2021  1:02 AM CST)



    



              Component    Value        Ref Range    Performed At  Pathologist



                                         Signature

 

    



                 Troponin I, 2HR  250 (HH)        <3-53 pg/mL pg/mL  [facility] 



                                         HS    

 

    



                 Delta 2HR Trop  79 (HH)         -8-<8           [facility] 











                                         Specimen

 





                                         Blood - Venous







                                        Narrative

 

                                        



[facility] - 2021  2:23 AM CST



[facility] converted from Troponin I ([facility]) to 
High-Sensitivity Troponin I ([employer]) on 2021.







   



                 Performing Organization  Address         City/[facility] Code  P

kathleen Number

 

   



                     [facility]                [address] [place] [address]

11 





* Retype Patient ABORH (2021  1:02 AM CST)



Only the most recent of 2 results within the time period is included.



    



              Component    Value        Ref Range    Performed At  Pathologist



                                         Signature

 

    



                     ABORH Type          A Positive          [facility] 



                                         [facility] 

 

    



                     Confirm Blood       Yes                 [facility] 



                                         [facility] 











                                         Specimen

 





                                         Blood - Venous







   



                 Performing Organization  Address         City/[facility]/ZIP Code  P

kathleen Number

 

   



                     [facility]  [address] [place] [address] 



                                         BLOOD BANK   





* Procalcitonin (2021  1:02 AM CST)



    



              Component    Value        Ref Range    Performed At  Pathologist



                                         Signature

 

    



                 Procalcitonin   0.09 (H)        <0.05 ng/mL     [facility] 











                                         Specimen

 





                                         Blood - Venous







   



                 Performing Organization  Address         City/[facility]/ZIP Code  P

kathleen Number

 

   



                     [facility]                [address] [address]

11 





* Extra Urine Specimen in Grey Tube (2021 11:16 PM CST)



    



              Component    Value        Ref Range    Performed At  Pathologist



                                         Signature

 

    



                     RAINBOW DRAW        [place] Draw/Extra Tube Hold   [facility] 



                           HOLD SPECIMENS            Specimen   











                                         Specimen

 





                                         Urine - Urine Clean Catch







   



                 Performing Organization  Address         City/[facility] Code  P

kathleen Number

 

   



                     [facility]                [address] [address]

11 





* COVID-19 Universal Admission PCR (Non-PUI) (2021 11:10 PM CST)



    



              Component    Value        Ref Range    Performed At  Pathologist



                                         Signature

 

    



                 SARS-COV-2 PCR  Negative        Negative        [facility] 











                                         Specimen

 





                                         Swab - NASOPHARYNGEAL



                                         SWAB







                                        Narrative

 

                                        



[facility] - 2021 11:27 AM CST



This RT-PCR test has been authorized by the FDA under an Emergency Use 
Authorization (EUA) for use by authorized laboratories.







   



                 Performing Organization  Address         City/[facility] Code  P

kathleen Number

 

   



                     [facility]                [address] [address]

11 





* Urinalysis Microscopic Only (2021 11:09 PM CST)



    



              Component    Value        Ref Range    Performed At  Pathologist



                                         Signature

 

    



                 Microscopic RBC  6-10 (A)        0 - 5 /hpf      [facility] 



                                         Urine    

 

    



                 Microscopic WBC  >40 (A)         0 - 5 /hpf      [facility] 



                                         Urine    

 

    



                 Epithelial      Absent          Absent          [facility] 



                                         Cells    

 

    



                 Hyaline Cast    Small (A)       Absent          [facility] 

 

    



                 Bacteria        Absent          Absent          [facility] 











                                         Specimen

 





                                         Urine - Urine







   



                 Performing Organization  Address         City/State/ZIP Code  P

kathleen Number

 

   



                     [facility]                [address] [address]

11 





* Urinalysis (includes microscopic review, if indicated) (2021 11:09 PM 
  CST)



    



              Component    Value        Ref Range    Performed At  Pathologist



                                         Signature

 

    



                 Appearance,     Yellow          Colorless, Yellow,  [facility] 



                           Urine                     Dark Yellow  

 

    



                 Glucose Urine   Negative        Negative mg/dL  [facility] 

 

    



                 Bilirubin Urine  Negative        Negative        [facility] 

 

    



                 Ketones Urine   Small (A)       Negative        [facility] 

 

    



                 Specific        1.019           >1.005-<1.030   [facility] 



                                         Gravity Urine    

 

    



                 Hemoglobin      Moderate (A)    Negative        [facility] 



                                         Urine    

 

    



                 PH Urine        5.5             5.0 - 8.0       [facility] 

 

    



                 Protein Urine   100   (A)       Negative, Trace  [facility] 



                           Qual                      mg/dL  

 

    



                 Urobilinogen    Normal          Normal, Negative,  [facility] 



                           Urine                     1.0 EU/dL  

 

    



                 Nitrite Urine   Negative        Negative        [facility] 

 

    



                 Leukocyte       Positive (A)    Negative        [facility] 



                                         Esterase    











                                         Specimen

 





                                         Urine - Urine







   



                 Performing Organization  Address         City/[facility]/ZIP Code  P

kathleen Number

 

   



                     [facility]                [address] [address]

11 





* Toxicology Screening Panel (2021 11:09 PM CST)



    



              Component    Value        Ref Range    Performed At  Pathologist



                                         Signature

 

    



                 Tetrahydrocanna  Not Detected    Not Detected    [facility] 



                                         binol Urine    

 

    



                 Phencyclidine   Not Detected    Not Detected    [facility] 



                                         Urine    

 

    



                 Cocaine Urine   Not Detected    Not Detected    [facility] 

 

    



                 Methamphetamine  Not Detected    Not Detected    [facility] 



                                         s Urine    

 

    



                 Opiates Urine   Not Detected    Not Detected    [facility] 

 

    



                 Amphetamines    Not Detected    Not Detected    [facility] 



                                         Urine    

 

    



                 Benzodiazepines  Not Detected    Not Detected    [facility] 



                                         Urine    

 

    



                 Tricyclic       Not Detected    Not Detected    [facility] 



                                         Antidepressants    

 

    



                 Methadone Urine  Not Detected    Not Detected    [facility] 

 

    



                 Barbiturates    Not Detected    Not Detected    [facility] 



                                         Urine    

 

    



                 Oxycodone Urine  Not Detected    Not Detected    [facility] 











                                         Specimen

 





                                         Urine - Urine







                                        Narrative

 

                                        



[facility] - 2021 12:15 AM CST



Toxicology cutoff values:

Assay      Cutoff value  Assay           
Cutoff value

Amphetamines    500 ng/mL   Methamphetamines      500 
ng/mL

Barbiturates    200 ng/mL   Opiates           100 
ng/mL

Benzodiazepines  150 ng/mL   Oxycodone          100 
ng/mL

Cocaine      150 ng/mL   Phencyclidine        25
ng/mL

Methadone     200 ng/mL   THC             
50 ng/mL

                 Tricyclic Antidepressants  
300 ng/mL

This drug screen provides presumptive results for medical purposes only. False 
positive results may occur. Physicians should order confirmatory testing on this
sample if the results are considered clinically significant.







   



                 Performing Organization  Address         City/State/ZIP Code  P

kathleen Number

 

   



                     [facility]                [address] [address]

11 





* XR Pelvis one or two views (2021 11:08 PM CST)



                                        Modality



                           Anatomical Region         Laterality 

 

                                        Computed Radiography



                                         Pelvis  











                                         Specimen

 









                                        Impressions

 

                                        



[facility] - 2021 11:25 PM CST



Negative for acute pelvic fracture. 

 

READING SITE: [facility]

 

THIS DOCUMENT HAS BEEN ELECTRONICALLY SIGNED BY ALEX GOMEZ MD







                                        Narrative

 

                                        



[facility] - 2021 11:25 PM CST



 

Patient:  Rebecca Angel 

 

Sex#: M               

#: 1950       Winston#: [id number]

Location: [facility]  Accession#: [id number]

 

Ordering Provider:  SARAH LUGO 

Procedure Requested: [id number] XR PELVIS ONE OR TWO VIEWS

Reason for Exam: trauma

Exam Ordered:    2021 2255

Begin exam date/time: 2021

Exam Date/Time:   2021

 

 

PROCEDURE INFORMATION: 

Exam: XR Pelvis 

Exam date and time: 2021 11:08 PM 

Age: 71 years old 

Clinical indication: Other: Trauma 

 

TECHNIQUE: 

Imaging protocol: XR pelvis. 

Views: 1 or 2 view. 

 

COMPARISON: 

No relevant prior studies available. 

 

FINDINGS: 

Bones/joints: No acute fractures. Hip joint alignments are unremarkable. 

Sclerosis of the cortical acetabular surfaces with mild joint space loss 

bilaterally. 

Soft tissues: Unremarkable. 

Vasculature: Mild degree of scattered atherosclerosis. 

 







                                        Procedure Note

 

                                        



Alex Gomez MD - 2021



Formatting of this note might be different from the original.

 

Patient:   Rebecca Angel  



Sex#: M                             

#: 1950             Winston#: [id number]

Location: [facility]    Accession#: [id number]

 

Ordering Provider:   SARAH LUGO 

Procedure Requested:  [id number] XR PELVIS ONE OR TWO VIEWS

Reason for Exam:  trauma

Exam Ordered:        2021  2255

Begin exam date/time:  2021

Exam Date/Time:      2021

 

 

PROCEDURE INFORMATION: 

Exam: XR Pelvis 

Exam date and time: 2021 11:08 PM 

Age: 71 years old 

Clinical indication: Other: Trauma 



TECHNIQUE: 

Imaging protocol: XR pelvis. 

Views: 1 or 2 view. 



COMPARISON: 

No relevant prior studies available. 



FINDINGS: 

Bones/joints: No acute fractures. Hip joint alignments are unremarkable. 

Sclerosis of the cortical acetabular surfaces with mild joint space loss 

bilaterally. 

Soft tissues: Unremarkable. 

Vasculature: Mild degree of scattered atherosclerosis. 



IMPRESSION

Negative for acute pelvic fracture. 



READING SITE: [facility]



THIS DOCUMENT HAS BEEN ELECTRONICALLY SIGNED BY ALEX GOMEZ MD







   



                 Performing Organization  Address         City/State/ZIP Code  P

kathleen Number

 

   



                                         [facility]   





* Critical Care (2021 10:21 PM CST)



                                        Modality



                           Anatomical Region         Laterality 

 

                                        Other







                                        Narrative

 

                                        



Sarah Lugo MD - 2021 10:21 PM CST



Sarah Lugo MD   2021 12:45 AM

Critical Care

Performed by: Sarah Lugo MD

Authorized by: Sarah Lugo MD 

 

Critical care provider statement: 

 Critical care time (minutes): 32

 Critical care time was exclusive of: Separately billable procedures and 

treating other patients

 Critical care was necessary to treat or prevent imminent or 

life-threatening deterioration of the following conditions: CNS failure 

or compromise, dehydration, circulatory failure, cardiac failure and 

respiratory failure

 Critical care was time spent personally by me on the following 

activities: Development of treatment plan with patient or surrogate, 

discussions with consultants, evaluation of patient's response to 

treatment, examination of patient, obtaining history from patient or 

surrogate, review of old charts, re-evaluation of patient's condition, 

pulse oximetry, ordering and review of radiographic studies, ordering and 

review of laboratory studies and ordering and performing treatments and 

interventions





from Last 3 Months



Insurance





                                        Type



            Payer      Benefit    Subscriber ID  Effective  Phone      Address 



                           Plan /                    Dates   



                                         Group     

 

                                        Medicare



            MEDICARE   MEDICARE   [id number]  2013-P  [phone]  [employer]

 



                     [address]            resent              ATTN 



                                         CLAIMS 



                                         DEPT 



                                         [address] 



                                         [address] 



                                         [place] 



                                         [address] 

 

                                         



            OTHER GOVERNMENT      [id number]  2021  [phone]  [address] 



                     [employer]            -Present            [address] 



                                         [place] 



                                         [address] 







     



            Guarantor Name  Account    Relation to  Date of    Phone      Billin

g Address



                     Type                Patient             Birth  

 

     



            Rebecca Angel  Personal/F  Self       1950  [phone] 

 [address]



                     amily               (Home)              [place] [address]

[address]

 

     



            Rebecca Angel  Personal/F  Self       1950  [phone] 

 [address]



                     amily               (Home)              [place] [address]

1







Advance Directives





For more information, please contact: [phone]







                          Patient Representative    Explanation



                           Type                      Date Recorded  

 

                                                     



                                         Health Care   



                                         Directive   











                          Date Inactivated          Comments



                           Code Status               Date Activated  

 

                                                     



                           DNR                       2021 12:16 PM  







                                                     

 

                          2021 12:15 PM        



                           Full Code                 2021 11:13 PM  







Care Teams





                          Start Date                End Date



                     Team Member         Relationship        Specialty  

 

                          21                   



                     Henrik Allison MD       PCP - General       Pediatrics  



                                         [address] [place] [id number]    



                                         [phone] (Work)    



                                         [phone] (Fax)

## 2022-11-26 ENCOUNTER — HOSPITAL ENCOUNTER (INPATIENT)
Dept: HOSPITAL 75 - ER FS | Age: 72
LOS: 6 days | Discharge: NURSING, REHAB OR LONG-TERM CARE | DRG: 565 | End: 2022-12-02
Attending: INTERNAL MEDICINE | Admitting: INTERNAL MEDICINE
Payer: MEDICARE

## 2022-11-26 VITALS — BODY MASS INDEX: 19.22 KG/M2 | WEIGHT: 134.26 LBS | HEIGHT: 70 IN

## 2022-11-26 DIAGNOSIS — Z66: ICD-10-CM

## 2022-11-26 DIAGNOSIS — N17.9: ICD-10-CM

## 2022-11-26 DIAGNOSIS — Z99.81: ICD-10-CM

## 2022-11-26 DIAGNOSIS — E46: ICD-10-CM
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
## 2022-11-26 DIAGNOSIS — F10.10: ICD-10-CM

## 2022-11-26 DIAGNOSIS — E16.2: ICD-10-CM

## 2022-11-26 DIAGNOSIS — D64.9: ICD-10-CM

## 2022-11-26 DIAGNOSIS — J44.9: ICD-10-CM

## 2022-11-26 DIAGNOSIS — Z98.2: ICD-10-CM

## 2022-11-26 DIAGNOSIS — R13.10: ICD-10-CM

## 2022-11-26 DIAGNOSIS — L89.90: ICD-10-CM

## 2022-11-26 DIAGNOSIS — F17.210: ICD-10-CM

## 2022-11-26 DIAGNOSIS — R62.7: ICD-10-CM

## 2022-11-26 DIAGNOSIS — Z20.822: ICD-10-CM

## 2022-11-26 DIAGNOSIS — E87.20: ICD-10-CM

## 2022-11-26 DIAGNOSIS — R65.10: ICD-10-CM

## 2022-11-26 DIAGNOSIS — T79.6XXA: Primary | ICD-10-CM

## 2022-11-26 DIAGNOSIS — Z51.5: ICD-10-CM

## 2022-11-26 DIAGNOSIS — E88.09: ICD-10-CM

## 2022-11-26 DIAGNOSIS — E86.0: ICD-10-CM

## 2022-11-26 LAB
ALBUMIN SERPL-MCNC: 2.9 GM/DL (ref 3.2–4.5)
ALP SERPL-CCNC: 95 U/L (ref 40–136)
ALT SERPL-CCNC: 15 U/L (ref 0–55)
APTT PPP: YELLOW S
BACTERIA #/AREA URNS HPF: (no result) /HPF
BASOPHILS # BLD AUTO: 0 10^3/UL (ref 0–0.1)
BASOPHILS NFR BLD AUTO: 0 % (ref 0–10)
BILIRUB SERPL-MCNC: 0.9 MG/DL (ref 0.1–1)
BILIRUB UR QL STRIP: (no result)
BUN/CREAT SERPL: 9
CALCIUM SERPL-MCNC: 8.7 MG/DL (ref 8.5–10.1)
CHLORIDE SERPL-SCNC: 101 MMOL/L (ref 98–107)
CO2 SERPL-SCNC: 25 MMOL/L (ref 21–32)
CREAT SERPL-MCNC: 0.86 MG/DL (ref 0.6–1.3)
EOSINOPHIL # BLD AUTO: 0 10^3/UL (ref 0–0.3)
EOSINOPHIL NFR BLD AUTO: 0 % (ref 0–10)
FIBRINOGEN PPP-MCNC: CLEAR MG/DL
GFR SERPLBLD BASED ON 1.73 SQ M-ARVRAT: 91 ML/MIN
GLUCOSE SERPL-MCNC: 72 MG/DL (ref 70–105)
GLUCOSE UR STRIP-MCNC: NEGATIVE MG/DL
HCT VFR BLD CALC: 34 % (ref 40–54)
HGB BLD-MCNC: 11.3 G/DL (ref 13.3–17.7)
KETONES UR QL STRIP: (no result)
LEUKOCYTE ESTERASE UR QL STRIP: NEGATIVE
LYMPHOCYTES # BLD AUTO: 0.5 10^3/UL (ref 1–4)
LYMPHOCYTES NFR BLD AUTO: 4 % (ref 12–44)
MANUAL DIFFERENTIAL PERFORMED BLD QL: YES
MCH RBC QN AUTO: 35 PG (ref 25–34)
MCHC RBC AUTO-ENTMCNC: 33 G/DL (ref 32–36)
MCV RBC AUTO: 106 FL (ref 80–99)
MONOCYTES # BLD AUTO: 0.6 10^3/UL (ref 0–1)
MONOCYTES NFR BLD AUTO: 5 % (ref 0–12)
MONOCYTES NFR BLD: 1 %
NEUTROPHILS # BLD AUTO: 11.2 10^3/UL (ref 1.8–7.8)
NEUTROPHILS NFR BLD AUTO: 90 % (ref 42–75)
NEUTS BAND NFR BLD MANUAL: 92 %
NITRITE UR QL STRIP: NEGATIVE
PH UR STRIP: 5 [PH] (ref 5–9)
PLATELET # BLD: 324 10^3/UL (ref 130–400)
PMV BLD AUTO: 11.1 FL (ref 9–12.2)
POTASSIUM SERPL-SCNC: 3.9 MMOL/L (ref 3.6–5)
PROT SERPL-MCNC: 5.7 GM/DL (ref 6.4–8.2)
PROT UR QL STRIP: NEGATIVE
RBC #/AREA URNS HPF: (no result) /HPF
SODIUM SERPL-SCNC: 141 MMOL/L (ref 135–145)
SP GR UR STRIP: >=1.03 (ref 1.02–1.02)
VARIANT LYMPHS NFR BLD MANUAL: 7 %
WBC # BLD AUTO: 12.4 10^3/UL (ref 4.3–11)
WBC #/AREA URNS HPF: (no result) /HPF

## 2022-11-26 PROCEDURE — 82550 ASSAY OF CK (CPK): CPT

## 2022-11-26 PROCEDURE — 85007 BL SMEAR W/DIFF WBC COUNT: CPT

## 2022-11-26 PROCEDURE — 81000 URINALYSIS NONAUTO W/SCOPE: CPT

## 2022-11-26 PROCEDURE — 82947 ASSAY GLUCOSE BLOOD QUANT: CPT

## 2022-11-26 PROCEDURE — 94640 AIRWAY INHALATION TREATMENT: CPT

## 2022-11-26 PROCEDURE — 70450 CT HEAD/BRAIN W/O DYE: CPT

## 2022-11-26 PROCEDURE — 87040 BLOOD CULTURE FOR BACTERIA: CPT

## 2022-11-26 PROCEDURE — 72125 CT NECK SPINE W/O DYE: CPT

## 2022-11-26 PROCEDURE — 84100 ASSAY OF PHOSPHORUS: CPT

## 2022-11-26 PROCEDURE — 83605 ASSAY OF LACTIC ACID: CPT

## 2022-11-26 PROCEDURE — 84484 ASSAY OF TROPONIN QUANT: CPT

## 2022-11-26 PROCEDURE — 71045 X-RAY EXAM CHEST 1 VIEW: CPT

## 2022-11-26 PROCEDURE — 36415 COLL VENOUS BLD VENIPUNCTURE: CPT

## 2022-11-26 PROCEDURE — 85025 COMPLETE CBC W/AUTO DIFF WBC: CPT

## 2022-11-26 PROCEDURE — 83735 ASSAY OF MAGNESIUM: CPT

## 2022-11-26 PROCEDURE — 84425 ASSAY OF VITAMIN B-1: CPT

## 2022-11-26 PROCEDURE — 93005 ELECTROCARDIOGRAM TRACING: CPT

## 2022-11-26 PROCEDURE — 90662 IIV NO PRSV INCREASED AG IM: CPT

## 2022-11-26 PROCEDURE — 82010 KETONE BODYS QUAN: CPT

## 2022-11-26 PROCEDURE — 82746 ASSAY OF FOLIC ACID SERUM: CPT

## 2022-11-26 PROCEDURE — 84145 PROCALCITONIN (PCT): CPT

## 2022-11-26 PROCEDURE — 85027 COMPLETE CBC AUTOMATED: CPT

## 2022-11-26 PROCEDURE — 51702 INSERT TEMP BLADDER CATH: CPT

## 2022-11-26 PROCEDURE — 94760 N-INVAS EAR/PLS OXIMETRY 1: CPT

## 2022-11-26 PROCEDURE — 80048 BASIC METABOLIC PNL TOTAL CA: CPT

## 2022-11-26 PROCEDURE — 87636 SARSCOV2 & INF A&B AMP PRB: CPT

## 2022-11-26 PROCEDURE — 80053 COMPREHEN METABOLIC PANEL: CPT

## 2022-11-26 PROCEDURE — 82607 VITAMIN B-12: CPT

## 2022-11-26 RX ADMIN — SODIUM CHLORIDE SCH MLS/HR: 900 INJECTION, SOLUTION INTRAVENOUS at 20:01

## 2022-11-26 RX ADMIN — SODIUM CHLORIDE SCH MLS/HR: 900 INJECTION, SOLUTION INTRAVENOUS at 21:49

## 2022-11-26 NOTE — ED GENERAL
General


Stated Complaint:  FALL


Source of Information:  Patient


Exam Limitations:  No Limitations





History of Present Illness


Date Seen by Provider:  Nov 26, 2022


Time Seen by Provider:  16:30





Allergies and Home Medications


Allergies


Coded Allergies:  


     No Known Drug Allergies (Unverified , 11/17/21)





Patient Home Medication List


Home Medication List Reviewed:  Yes


Multivitamin/Iron/Folic Acid (Tab-A-Flora Multivit with Iron) 18 Mg Iron-400 Mcg 

Tablet, 1 EA PO DAILY@0700


   Prescribed by: RONI CASAS on 12/2/22 0703





Past Medical-Social-Family Hx


Past Medical History


Surgery/Hospitalization HX:  


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


Hepatitis C





Physical Exam


Vital Signs





Vital Signs - First Documented




















Capillary Refill :


Height, Weight, BMI


Height: '"


Weight: lbs. oz. kg;  BMI


Method:





Focused Exam


Lactate Level


11/26/22 18:41: Lactic Acid Level 2.83*H





Lactic Acid Level





Laboratory Tests








Test


 11/26/22


16:32 11/26/22


18:41


 


Lactic Acid Level


 2.80 MMOL/L


(0.50-2.00)  *H 2.83 MMOL/L


(0.50-2.00)  *H











Progress/Results/Core Measures


Suspected Sepsis


SIRS


Temperature: 


Pulse:  


Respiratory Rate: 


 


Blood Pressure  / 


Mean: 


 





11/26/22 18:41: Lactic Acid Level 2.83*H








Laboratory Tests


11/26/22 16:32: Total Bilirubin 0.9





Results/Orders


Lab Results





Laboratory Tests








Test


 11/26/22


16:32 11/26/22


16:43 11/26/22


18:18 11/26/22


18:41 Range/Units


 


 


White Blood Count


 12.4 H


 


 


 


 4.3-11.0


10^3/uL


 


Red Blood Count


 3.20 L


 


 


 


 4.30-5.52


10^6/uL


 


Hemoglobin 11.3 L    13.3-17.7  g/dL


 


Hematocrit 34 L    40-54  %


 


Mean Corpuscular Volume 106 H    80-99  fL


 


Mean Corpuscular Hemoglobin 35 H    25-34  pg


 


Mean Corpuscular Hemoglobin


Concent 33 


 


 


 


 32-36  g/dL





 


Red Cell Distribution Width 13.2     10.0-14.5  %


 


Platelet Count


 324 


 


 


 


 130-400


10^3/uL


 


Mean Platelet Volume 11.1     9.0-12.2  fL


 


Immature Granulocyte % (Auto) 0      %


 


Neutrophils (%) (Auto) 90 H    42-75  %


 


Lymphocytes (%) (Auto) 4 L    12-44  %


 


Monocytes (%) (Auto) 5     0-12  %


 


Eosinophils (%) (Auto) 0     0-10  %


 


Basophils (%) (Auto) 0     0-10  %


 


Neutrophils # (Auto)


 11.2 H


 


 


 


 1.8-7.8


10^3/uL


 


Lymphocytes # (Auto)


 0.5 L


 


 


 


 1.0-4.0


10^3/uL


 


Monocytes # (Auto)


 0.6 


 


 


 


 0.0-1.0


10^3/uL


 


Eosinophils # (Auto)


 0.0 


 


 


 


 0.0-0.3


10^3/uL


 


Basophils # (Auto)


 0.0 


 


 


 


 0.0-0.1


10^3/uL


 


Immature Granulocyte # (Auto)


 0.0 


 


 


 


 0.0-0.1


10^3/uL


 


Neutrophils % (Manual) 92      %


 


Lymphocytes % (Manual) 7      %


 


Monocytes % (Manual) 1      %


 


Sodium Level 141     135-145  MMOL/L


 


Potassium Level 3.9     3.6-5.0  MMOL/L


 


Chloride Level 101       MMOL/L


 


Carbon Dioxide Level 25     21-32  MMOL/L


 


Anion Gap 15 H    5-14  MMOL/L


 


Blood Urea Nitrogen 8     7-18  MG/DL


 


Creatinine


 0.86 


 


 


 


 0.60-1.30


MG/DL


 


Estimat Glomerular Filtration


Rate 91 


 


 


 


  





 


BUN/Creatinine Ratio 9      


 


Glucose Level 72       MG/DL


 


Lactic Acid Level


 2.80 *H


 


 


 2.83 *H


 0.50-2.00


MMOL/L


 


Calcium Level 8.7     8.5-10.1  MG/DL


 


Corrected Calcium 9.6     8.5-10.1  MG/DL


 


Total Bilirubin 0.9     0.1-1.0  MG/DL


 


Aspartate Amino Transf


(AST/SGOT) 53 H


 


 


 


 5-34  U/L





 


Alanine Aminotransferase


(ALT/SGPT) 15 


 


 


 


 0-55  U/L





 


Alkaline Phosphatase 95       U/L


 


Total Creatine Kinase 785 H      U/L


 


Troponin I < 0.30     <0.30  NG/ML


 


Total Protein 5.7 L    6.4-8.2  GM/DL


 


Albumin 2.9 L    3.2-4.5  GM/DL


 


Urine Color  YELLOW     


 


Urine Clarity  CLEAR     


 


Urine pH  5.0    5-9  


 


Urine Specific Gravity  >=1.030    1.016-1.022  


 


Urine Protein  NEGATIVE    NEGATIVE  


 


Urine Glucose (UA)  NEGATIVE    NEGATIVE  


 


Urine Ketones  1+ H   NEGATIVE  


 


Urine Nitrite  NEGATIVE    NEGATIVE  


 


Urine Bilirubin  1+ H   NEGATIVE  


 


Urine Urobilinogen  0.2    < = 1.0  MG/DL


 


Urine Leukocyte Esterase  NEGATIVE    NEGATIVE  


 


Urine RBC (Auto)  NEGATIVE    NEGATIVE  


 


Urine RBC  0-2     /HPF


 


Urine WBC  NONE     /HPF


 


Urine Crystals  NONE     /LPF


 


Urine Bacteria  TRACE     /HPF


 


Urine Casts  PRESENT     /LPF


 


Urine Hyaline Casts  10-25 H    /LPF


 


Urine Coarse Granular Casts  0-2 H    /LPF


 


Urine Mucus  LARGE H    /LPF


 


Urine Culture Indicated  NO     


 


Influenza Type A (RT-PCR)   Not Detected   Not Detecte  


 


Influenza Type B (RT-PCR)   Not Detected   Not Detecte  


 


SARS-CoV-2 RNA (RT-PCR)   Not Detected   Not Detecte  








Micro Results





Microbiology


11/26/22 Blood Culture - Final, Complete


           No growth


11/26/22 Blood Culture - Final, Complete


           No growth





My Orders





Orders - CHRISTOPHER QUEZADA DO


Cbc With Automated Diff (11/26/22 16:38)


Comprehensive Metabolic Panel (11/26/22 16:38)


Urinalysis (11/26/22 16:38)


Ekg Tracing (11/26/22 16:38)


Troponin I Fs (11/26/22 16:38)


Chest 1 View Ap/Pa Only (11/26/22 16:38)


Ct Head Wo (11/26/22 16:38)


Blood Culture (11/26/22 16:38)


Creatine Kinase (11/26/22 16:43)


Manual Differential (11/26/22 16:32)


Ct Cervical Spine Wo (11/26/22 16:50)


Lactic Acid Analyzer (11/26/22 16:32)


Blood Culture (11/26/22 16:32)


Covid 19 Inhouse Test (11/26/22 17:48)


Influenza A And B By Pcr (11/26/22 17:48)


Isolation Central Supply Req (11/26/22 17:48)


Ns Iv 1000 Ml (Sodium Chloride 0.9%) (11/26/22 19:45)


Piperacillin Sodium/Tazobactam (Zosyn Vi (11/26/22 19:45)


Ed Admission (Communication) (11/26/22 21:11)





Vital Signs/I&O











 11/27/22 11/27/22





 00:35 00:35


 


Temp 36.8 


 


Pulse 108 


 


Resp 20 


 


B/P (MAP) 141/63 (89) 


 


Pulse Ox 97 


 


O2 Delivery Nasal Cannula Nasal Cannula


 


O2 Flow Rate 2.00 2.00





Capillary Refill :





Departure


Departure-Patient Inst.


Referrals:  


SAM SANTIZO MD (PCP/Family)


Primary Care Physician


Scripts


Multivitamin/Iron/Folic Acid (Tab-A-Flora Multivit with Iron) 18 Mg Iron-400 Mcg 

Tablet


1 EA PO DAILY@0700, #30 TAB


   Prov: RONI CASAS MD         12/2/22











CHRISTOPHER QUEZADA DO                   Nov 26, 2022 16:48

## 2022-11-26 NOTE — DIAGNOSTIC IMAGING REPORT
EXAMINATION: CT head without contrast.



TECHNIQUE: Multiple contiguous axial images were obtained through

the brain without the use of intravenous contrast. All CT scans

use one or more of the following dose optimizing techniques:

automated exposure control, MA and/or KvP adjustment based on

patient size and exam type or iterative reconstruction. 



HISTORY: Altered mental status. Found down.



COMPARISON: 11/17/2021.



FINDINGS: No large acute territorial ischemia, mass, or

hemorrhage. No midline shift or mass effect. Decreased

attenuation is seen in the periventricular and subcortical white

matter. Stable configuration of the right parietal approach 

shunt with stable prominence of the lateral ventricles. Prior

aneurysm clipping changes are seen at the basilar tip. The

basilar cisterns are patent and unremarkable. 



The orbits are normal. Paranasal sinuses are normal. Mastoid air

cells are clear. No soft tissue abnormality is seen. No osseus

lesion or fracture is seen.



IMPRESSION:  

1. No large acute territorial ischemia, mass or hemorrhage.

2. Stable right parietal approach  shunt with stable prominence

of the lateral ventricles. 



Dictated by: 



  Dictated on workstation # [id number]

## 2022-11-26 NOTE — DIAGNOSTIC IMAGING REPORT
PROCEDURE: CT cervical spine without contrast.



TECHNIQUE: Multiple contiguous axial images were obtained through

the cervical spine without the use of intravenous contrast.

Sagittal and coronal reformations were then performed. Auto

Exposure Controls were utilized during the CT exam to meet ALARA

standards for radiation dose reduction. 



INDICATION: Found down. Neck pain.



COMPARISON: None.



FINDINGS: No acute fracture or dislocation is seen in the

cervical spine. There is grade 1 anterolisthesis of C2 on C3. The

craniocervical junction is intact. No suspicious focal osseous

lesion. Degenerative changes are seen in the cervical spine with

disc osteophyte complexes, disc height loss and uncovertebral

arthropathy. No evidence of acute spinal canal stenosis. No high

density material is seen within the spinal canal. The soft

tissues of the neck are unremarkable. The included lungs are

clear.



IMPRESSION:

1. No acute fracture or dislocation in the cervical spine.

2. Grade 1 anterolisthesis of C2 on C3.



Dictated by: 



  Dictated on workstation # [id number]

## 2022-11-26 NOTE — ED GENERAL
General


Stated Complaint:  FALL


Source of Information:  Patient


Exam Limitations:  No Limitations





History of Present Illness


Date Seen by Provider:  Nov 26, 2022


Time Seen by Provider:  16:20


Initial Comments


Patient is a 72-year-old male who lives by calm down and is followed by EMS.  He

was last contacted 3 days ago and has been believed to be on the floor for 

greater than 24 hours.  The patient is alert, confused, with incoherent speech 

with limited cooperation following exams.  He have sores in his knees, neck face

and shoulders.  He is incontinent of stool and urine with skin breakdown on his 

buttocks.  He has no gross deformities.  History is limited by the patient's 

presentation.


Timing/Duration:  1-3 Hours


Severity:  Moderate


Modifying Factors:  improves with Other


Associated Systoms:  Other





Allergies and Home Medications


Allergies


Coded Allergies:  


     No Known Drug Allergies (Unverified , 11/17/21)





Patient Home Medication List


Home Medication List Reviewed:  Yes





Review of Systems


Review of Systems


Constitutional:  see HPI


EENTM:  see HPI


Respiratory:  see HPI


Cardiovascular:  see HPI


Gastrointestinal:  see HPI


Genitourinary:  see HPI


Musculoskeletal:  see HPI


Skin:  see HPI


Psychiatric/Neurological:  See HPI


Hematologic/Lymphatic:  See HPI


Immunological/Allergic:  see HPI





All Other Systems Reviewed


Negative Unless Noted:  No





Past Medical-Social-Family Hx


Past Medical History


Surgery/Hospitalization HX:  


Brain aneurysm treated at [place],  shunt, COPD that is oxygen dependent,


Hepatitis C





Physical Exam


Vital Signs





Vital Signs - First Documented








 11/26/22





 16:19


 


Temp 36.1


 


Pulse 109


 


Resp 20


 


B/P (MAP) 142/70 (94)


 


Pulse Ox 92


 


O2 Delivery Room Air





Capillary Refill :


Height, Weight, BMI


Height: '"


Weight: lbs. oz. kg;  BMI


Method:


General Appearance:  No Apparent Distress, WD/WN, Anxious


Eyes:  Bilateral Eye Normal Inspection, Bilateral Eye PERRL, Bilateral Eye EOMI


HEENT:  PERRL/EOMI, Normal ENT Inspection, Pharynx Normal, Moist Mucous 

Membranes


Neck:  Non Tender, Supple


Respiratory:  Lungs Clear


Cardiovascular:  Regular Rate, Rhythm, No Edema


Gastrointestinal:  Non Tender, Soft


Back:  Normal Inspection, No Vertebral Tenderness


Neurologic/Psychiatric:  Oriented x3, No Motor/Sensory Deficits


Skin:  Other (Bruising on head, knees, back shoulders and arms.)


Lymphatic:  No Adenopathy





Focused Exam


Sepsis Stage:  Ruled Out


Lactate Level


11/26/22 16:32: Lactic Acid Level 2.80*H


11/26/22 18:41: Lactic Acid Level 2.83*H





Lactic Acid Level





Laboratory Tests








Test


 11/26/22


16:32 11/26/22


18:41


 


Lactic Acid Level


 2.80 MMOL/L


(0.50-2.00)  *H 2.83 MMOL/L


(0.50-2.00)  *H











Progress/Results/Core Measures


Suspected Sepsis


SIRS


Temperature: 


Pulse:  


Respiratory Rate: 


 


Laboratory Tests


11/26/22 16:32: White Blood Count 12.4H


Blood Pressure  / 


Mean: 


 





11/26/22 16:32: Lactic Acid Level 2.80*H


11/26/22 18:41: Lactic Acid Level 2.83*H


Laboratory Tests


11/26/22 16:32: 


Creatinine 0.86, Platelet Count 324, Total Bilirubin 0.9








Results/Orders


Lab Results





Laboratory Tests








Test


 11/26/22


16:32 11/26/22


16:43 11/26/22


18:18 11/26/22


18:41 Range/Units


 


 


White Blood Count


 12.4 H


 


 


 


 4.3-11.0


10^3/uL


 


Red Blood Count


 3.20 L


 


 


 


 4.30-5.52


10^6/uL


 


Hemoglobin 11.3 L    13.3-17.7  g/dL


 


Hematocrit 34 L    40-54  %


 


Mean Corpuscular Volume 106 H    80-99  fL


 


Mean Corpuscular Hemoglobin 35 H    25-34  pg


 


Mean Corpuscular Hemoglobin


Concent 33 


 


 


 


 32-36  g/dL





 


Red Cell Distribution Width 13.2     10.0-14.5  %


 


Platelet Count


 324 


 


 


 


 130-400


10^3/uL


 


Mean Platelet Volume 11.1     9.0-12.2  fL


 


Immature Granulocyte % (Auto) 0      %


 


Neutrophils (%) (Auto) 90 H    42-75  %


 


Lymphocytes (%) (Auto) 4 L    12-44  %


 


Monocytes (%) (Auto) 5     0-12  %


 


Eosinophils (%) (Auto) 0     0-10  %


 


Basophils (%) (Auto) 0     0-10  %


 


Neutrophils # (Auto)


 11.2 H


 


 


 


 1.8-7.8


10^3/uL


 


Lymphocytes # (Auto)


 0.5 L


 


 


 


 1.0-4.0


10^3/uL


 


Monocytes # (Auto)


 0.6 


 


 


 


 0.0-1.0


10^3/uL


 


Eosinophils # (Auto)


 0.0 


 


 


 


 0.0-0.3


10^3/uL


 


Basophils # (Auto)


 0.0 


 


 


 


 0.0-0.1


10^3/uL


 


Immature Granulocyte # (Auto)


 0.0 


 


 


 


 0.0-0.1


10^3/uL


 


Neutrophils % (Manual) 92      %


 


Lymphocytes % (Manual) 7      %


 


Monocytes % (Manual) 1      %


 


Sodium Level 141     135-145  MMOL/L


 


Potassium Level 3.9     3.6-5.0  MMOL/L


 


Chloride Level 101       MMOL/L


 


Carbon Dioxide Level 25     21-32  MMOL/L


 


Anion Gap 15 H    5-14  MMOL/L


 


Blood Urea Nitrogen 8     7-18  MG/DL


 


Creatinine


 0.86 


 


 


 


 0.60-1.30


MG/DL


 


Estimat Glomerular Filtration


Rate 91 


 


 


 


  





 


BUN/Creatinine Ratio 9      


 


Glucose Level 72       MG/DL


 


Lactic Acid Level


 2.80 *H


 


 


 2.83 *H


 0.50-2.00


MMOL/L


 


Calcium Level 8.7     8.5-10.1  MG/DL


 


Corrected Calcium 9.6     8.5-10.1  MG/DL


 


Total Bilirubin 0.9     0.1-1.0  MG/DL


 


Aspartate Amino Transf


(AST/SGOT) 53 H


 


 


 


 5-34  U/L





 


Alanine Aminotransferase


(ALT/SGPT) 15 


 


 


 


 0-55  U/L





 


Alkaline Phosphatase 95       U/L


 


Total Creatine Kinase 785 H      U/L


 


Troponin I < 0.30     <0.30  NG/ML


 


Total Protein 5.7 L    6.4-8.2  GM/DL


 


Albumin 2.9 L    3.2-4.5  GM/DL


 


Urine Color  YELLOW     


 


Urine Clarity  CLEAR     


 


Urine pH  5.0    5-9  


 


Urine Specific Gravity  >=1.030    1.016-1.022  


 


Urine Protein  NEGATIVE    NEGATIVE  


 


Urine Glucose (UA)  NEGATIVE    NEGATIVE  


 


Urine Ketones  1+ H   NEGATIVE  


 


Urine Nitrite  NEGATIVE    NEGATIVE  


 


Urine Bilirubin  1+ H   NEGATIVE  


 


Urine Urobilinogen  0.2    < = 1.0  MG/DL


 


Urine Leukocyte Esterase  NEGATIVE    NEGATIVE  


 


Urine RBC (Auto)  NEGATIVE    NEGATIVE  


 


Urine RBC  0-2     /HPF


 


Urine WBC  NONE     /HPF


 


Urine Crystals  NONE     /LPF


 


Urine Bacteria  TRACE     /HPF


 


Urine Casts  PRESENT     /LPF


 


Urine Hyaline Casts  10-25 H    /LPF


 


Urine Coarse Granular Casts  0-2 H    /LPF


 


Urine Mucus  LARGE H    /LPF


 


Urine Culture Indicated  NO     


 


Influenza Type A (RT-PCR)   Not Detected   Not Detecte  


 


Influenza Type B (RT-PCR)   Not Detected   Not Detecte  


 


SARS-CoV-2 RNA (RT-PCR)   Not Detected   Not Detecte  








My Orders





Orders - CHRISTOPHER QUEZADA DO


Cbc With Automated Diff (11/26/22 16:38)


Comprehensive Metabolic Panel (11/26/22 16:38)


Urinalysis (11/26/22 16:38)


Ekg Tracing (11/26/22 16:38)


Troponin I Fs (11/26/22 16:38)


Chest 1 View Ap/Pa Only (11/26/22 16:38)


Ct Head Wo (11/26/22 16:38)


Blood Culture (11/26/22 16:38)


Creatine Kinase (11/26/22 16:43)


Manual Differential (11/26/22 16:32)


Ct Cervical Spine Wo (11/26/22 16:50)


Lactic Acid Analyzer (11/26/22 16:32)


Blood Culture (11/26/22 16:32)


Covid 19 Inhouse Test (11/26/22 17:48)


Influenza A And B By Pcr (11/26/22 17:48)


Isolation Central Supply Req (11/26/22 17:48)


Ns Iv 1000 Ml (Sodium Chloride 0.9%) (11/26/22 19:45)


Piperacillin Sodium/Tazobactam (Zosyn Vi (11/26/22 19:45)





Medications Given in ED





Current Medications








 Medications  Dose


 Ordered  Sig/Jamila


 Route  Start Time


 Stop Time Status Last Admin


Dose Admin


 


 Piperacillin Sod/


 Tazobactam Sod


 4.5 gm/Sodium


 Chloride  100 ml @ 


 200 mls/hr  ONCE  ONCE


 IV  11/26/22 19:45


 11/26/22 20:14 DC 11/26/22 20:06


200 MLS/HR








Vital Signs/I&O











 11/26/22





 16:19


 


Temp 36.1


 


Pulse 109


 


Resp 20


 


B/P (MAP) 142/70 (94)


 


Pulse Ox 92


 


O2 Delivery Room Air





Capillary Refill :





Departure


Communication (Admissions)


CT head/cervical spine: No acute findings per radiology report


Chest x-ray: No acute findings.





Patient with unwitnessed fall with lying on the ground possibly up to 24 hours. 

Multiple pressure sores without skin breakdown or evidence of acute injury on 

imaging studies.  Patient reports disequilibrium and leading to his fall.  He is

not have any focal neurologic deficits.  IV fluids and antibiotics given.  

Patient will be admitted to the hospital service for further evaluation and 

treatment.





Impression





   Primary Impression:  


   Altered mental state


   Additional Impressions:  


   Generalized weakness


   Pressure sore


Disposition:  09 ADMITTED AS INPATIENT


Condition:  Stable





Admissions


Decision to Admit Reason:  Admit from ER (General)


Decision to Admit/Date:  Nov 26, 2022


Time/Decision to Admit Time:  21:10





Departure-Patient Inst.


Referrals:  


SAM SANTIZO MD (PCP/Family)


Primary Care Physician











CHRISTOPHER QUEZADA DO                   Nov 26, 2022 16:50

## 2022-11-26 NOTE — DIAGNOSTIC IMAGING REPORT
EXAMINATION: Chest 1 view.



HISTORY: Chest pain.



COMPARISON: 11/17/2021.



FINDINGS:  shunt catheter is seen descending on the right

chest.



The lung volumes are normal. No focal consolidation is seen. No

large pleural effusion or pneumothorax is seen. The

cardiomediastinal silhouette is normal in size and contour. No

acute osseous abnormality is seen.



IMPRESSION: No acute pleuroparenchymal process. 



Dictated by: 



  Dictated on workstation # [id number]

## 2022-11-27 VITALS — DIASTOLIC BLOOD PRESSURE: 75 MMHG | SYSTOLIC BLOOD PRESSURE: 120 MMHG

## 2022-11-27 VITALS — DIASTOLIC BLOOD PRESSURE: 73 MMHG | SYSTOLIC BLOOD PRESSURE: 145 MMHG

## 2022-11-27 VITALS — DIASTOLIC BLOOD PRESSURE: 88 MMHG | SYSTOLIC BLOOD PRESSURE: 128 MMHG

## 2022-11-27 VITALS — DIASTOLIC BLOOD PRESSURE: 77 MMHG | SYSTOLIC BLOOD PRESSURE: 151 MMHG

## 2022-11-27 VITALS — SYSTOLIC BLOOD PRESSURE: 141 MMHG | DIASTOLIC BLOOD PRESSURE: 63 MMHG

## 2022-11-27 VITALS — SYSTOLIC BLOOD PRESSURE: 127 MMHG | DIASTOLIC BLOOD PRESSURE: 72 MMHG

## 2022-11-27 VITALS — DIASTOLIC BLOOD PRESSURE: 87 MMHG | SYSTOLIC BLOOD PRESSURE: 135 MMHG

## 2022-11-27 LAB
BASOPHILS # BLD AUTO: 0 10^3/UL (ref 0–0.1)
BASOPHILS NFR BLD AUTO: 0 % (ref 0–10)
BUN/CREAT SERPL: 11
CALCIUM SERPL-MCNC: 7.7 MG/DL (ref 8.5–10.1)
CHLORIDE SERPL-SCNC: 107 MMOL/L (ref 98–107)
CK SERPL-CCNC: 1672 U/L (ref 30–200)
CO2 SERPL-SCNC: 18 MMOL/L (ref 21–32)
CREAT SERPL-MCNC: 0.88 MG/DL (ref 0.6–1.3)
EOSINOPHIL # BLD AUTO: 0 10^3/UL (ref 0–0.3)
EOSINOPHIL NFR BLD AUTO: 0 % (ref 0–10)
GFR SERPLBLD BASED ON 1.73 SQ M-ARVRAT: 90 ML/MIN
GLUCOSE SERPL-MCNC: 40 MG/DL (ref 70–105)
HCT VFR BLD CALC: 30 % (ref 40–54)
HGB BLD-MCNC: 9.5 G/DL (ref 13.3–17.7)
LYMPHOCYTES # BLD AUTO: 0.8 10^3/UL (ref 1–4)
LYMPHOCYTES NFR BLD AUTO: 7 % (ref 12–44)
MANUAL DIFFERENTIAL PERFORMED BLD QL: NO
MCH RBC QN AUTO: 35 PG (ref 25–34)
MCHC RBC AUTO-ENTMCNC: 32 G/DL (ref 32–36)
MCV RBC AUTO: 109 FL (ref 80–99)
MONOCYTES # BLD AUTO: 0.9 10^3/UL (ref 0–1)
MONOCYTES NFR BLD AUTO: 8 % (ref 0–12)
NEUTROPHILS # BLD AUTO: 9.8 10^3/UL (ref 1.8–7.8)
NEUTROPHILS NFR BLD AUTO: 85 % (ref 42–75)
PLATELET # BLD: 261 10^3/UL (ref 130–400)
PMV BLD AUTO: 10.7 FL (ref 9–12.2)
POTASSIUM SERPL-SCNC: 4.1 MMOL/L (ref 3.6–5)
SODIUM SERPL-SCNC: 143 MMOL/L (ref 135–145)
WBC # BLD AUTO: 11.5 10^3/UL (ref 4.3–11)

## 2022-11-27 RX ADMIN — ENOXAPARIN SODIUM SCH MG: 100 INJECTION SUBCUTANEOUS at 09:14

## 2022-11-27 RX ADMIN — DOCUSATE SODIUM SCH MG: 100 CAPSULE ORAL at 09:17

## 2022-11-27 RX ADMIN — DOCUSATE SODIUM SCH MG: 100 CAPSULE ORAL at 20:49

## 2022-11-27 RX ADMIN — DEXTROSE MONOHYDRATE, SODIUM CHLORIDE, AND POTASSIUM CHLORIDE SCH MLS/HR: 50; 4.5; 1.49 INJECTION, SOLUTION INTRAVENOUS at 17:54

## 2022-11-27 RX ADMIN — NICOTINE SCH MG: 14 PATCH, EXTENDED RELEASE TRANSDERMAL at 13:00

## 2022-11-27 RX ADMIN — IPRATROPIUM BROMIDE AND ALBUTEROL SULFATE SCH ML: .5; 3 SOLUTION RESPIRATORY (INHALATION) at 21:24

## 2022-11-27 RX ADMIN — DEXTROSE MONOHYDRATE, SODIUM CHLORIDE, AND POTASSIUM CHLORIDE SCH MLS/HR: 50; 4.5; 1.49 INJECTION, SOLUTION INTRAVENOUS at 09:14

## 2022-11-27 NOTE — HISTORY & PHYSICAL-HOSPITALIST
History of Present Illness


HPI/Chief Complaint


Catrachito Angel is a 72 year old male with PMH subarachnoid hemorrhage, 

ventriculoperitoneal shunt, COPD, hepatitis C, who presented after a fall at 

home. He lives by himself. His neighbors found him down on the floor in the 

kitchen. He reports that he lost his balance. He is a poor historian. He denies 

pain. He denies shortness of breath. He denies abdominal pain, nausea, and 

vomiting. He is awake and alert. He is oriented to person, place, month, and 

year. He is disoriented to city and thinks he is in [place]. We discussed 

his goals of care and he says he does not want CPR or a breathing machine. He wa

nts to be DNR/DNI. He says he wants to go home and smoke cigarettes and drink 

whiskey. We attempted to call his DPOA, Steven, but he did not answer.


Source:  patient, RN/MD


Exam Limitations:  clinical condition


Date Seen


11/27/22


Time Seen by a Provider:  10:55


Attending Physician


Henrik Allison MD


PCP


Admitting Physician:


Miguel A Vega MD 








Attending Physician:


Miguel A Vega MD


Referring Physician





Date of Admission


Nov 27, 2022 at 00:35





Home Medications & Allergies


Home Medications


Reviewed patient Home Medication Reconciliation performed by pharmacy medication

reconciliations technician and/or nursing.


Patients Allergies have been reviewed.





Allergies





Allergies


Coded Allergies


  No Known Drug Allergies ([id number]/17/21)








Past Medical-Social-Family Hx


Patient Social History


Tobacco Use?:  Yes


Tobacco type used:  Cigarettes


Smoking Status:  Current Everyday Smoker


Substance use?:  Unable to obtain


Alcohol Use?:  Yes


Pt feels they are or have been:  Unable to obtain





Current Status


Advance Directives:  Unable to obtain


Communicates:  Verbally


Primary Language:  English


Preferred Spoken Language:  English


Is interpretation needed?:  No


Sensory deficits:  Hearing impairment


Implanted or [employer]:  Other





Past Medical History


Surgeries:  Brain Shunt


COPD


Hepatitis





Family Medical History


No Pertinent Family Hx





Review of Systems


Constitutional:  weakness


Respiratory:  no symptoms reported


Cardiovascular:  no symptoms reported


Gastrointestinal:  no symptoms reported





Physical Exam


Physical Exam


Vital Signs





Vital Signs - First Documented








 11/26/22 11/26/22





 16:19 20:50


 


Temp 36.1 


 


Pulse 109 


 


Resp 20 


 


B/P (MAP) 142/70 (94) 


 


Pulse Ox 92 


 


O2 Delivery Room Air 


 


O2 Flow Rate  2.00





Capillary Refill : Less Than 3 Seconds


Height, Weight, BMI


Height: '"


Weight: lbs. oz. kg; 19.26 BMI


Method:


General Appearance:  No Apparent Distress, Chronically ill, Thin, Other 

(unkempt)


HEENT:  PERRL/EOMI, Pharynx Normal


Neck:  Normal Inspection, Supple


Respiratory:  No Respiratory Distress, Crackles, Decreased Breath Sounds


Cardiovascular:  No Murmur, Tachycardia


Gastrointestinal:  Normal Bowel Sounds, Non Tender, Soft


Extremity:  Non Tender; No Inflammation; Pedal Edema


Neurologic/Psychiatric:  Alert, Oriented x3, Motor Weakness (no focal deficits)





Results


Results/Procedures


Labs


Laboratory Tests


11/26/22 16:32








11/27/22 07:40








Patient resulted labs reviewed.


Imaging:  Reviewed Imaging Films, Reviewed Imaging Report





Assessment/Plan


Admission Diagnosis


Rhabdomyolysis


Admission Status:  Inpatient Order (span 2 midnights)


Reason for Inpatient Admission:  


IV fluids


Dysphagia evalutation


Anemia evaluation


Hypoglycemia





Assessment and Plan


Dehydration


Fall from ground level


Rhabdomyolysis


Lactic acidosis


   IV fluids


   CK trended up slightly


   PT/OT


   Social work consulted


   Palliative care consulted





Malnutrition


Hypoalbuminemia


Dysphagia


   ST consult


   NPO


   Monitor electrolytes





Hypoglycemia


High anion gap metabolic acidosis


   Not diabetic


   D5 1/2 NS w/20KCl


   Possibly due to prolonged starvation


   Check beta-hydroxybutyrate





SIRS


   WBC elevated, tachycardia


   CXR, UA, blood cultures negative


   No infectious source identified


   Antibiotics deferred





Alcohol abuse


Tobacco abuse


   Nicotine patch


   Monitor for withdrawal symptoms


   Add multivitamin, folate, thiamine





Macrocytic anemia


   Mild anemia


   Possibly due to alcohol use


   Add folic acid and B12 levels





History of subarachnoid hemorrhage


Ventriculoperitoneal shunt


   Clinically significant, no acute management needs





DVT prophylaxis: Lovenox





Diagnosis/Problems


Diagnosis/Problems





(1) Rhabdomyolysis


Status:  Acute


Qualifiers:  


   Rhabdomyolysis type:  non-traumatic  Qualified Codes:  M62.82 - 

Rhabdomyolysis


(2) Fall from ground level


Status:  Acute


(3) Debility


Status:  Acute


(4) Lactic acidosis


Status:  Acute


(5) SIRS (systemic inflammatory response syndrome)


Status:  Acute


(6) Malnutrition


Status:  Acute


Qualifiers:  


   Malnutrition type:  protein-calorie malnutrition  


(7) Hypoalbuminemia


Status:  Acute


(8) Dysphagia


Status:  Acute


(9) Alcohol abuse


Status:  Chronic


(10) Tobacco abuse


Status:  Chronic


(11) Macrocytic anemia


Status:  Acute


(12) History of subarachnoid hemorrhage


Status:  Chronic


(13) Status post ventriculoperitoneal shunt


Status:  Chronic


(14) Dehydration


Status:  Acute


(15) Pressure sore


Status:  Acute


(16) COPD (chronic obstructive pulmonary disease)


Status:  Chronic


(17) FTT (failure to thrive) in adult


Status:  Acute


(18) Non-diabetic hypoglycemia


Status:  Acute


(19) High anion gap metabolic acidosis


Status:  Acute











MIGUEL A VEGA MD              Nov 27, 2022 15:56

## 2022-11-28 VITALS — SYSTOLIC BLOOD PRESSURE: 147 MMHG | DIASTOLIC BLOOD PRESSURE: 66 MMHG

## 2022-11-28 VITALS — SYSTOLIC BLOOD PRESSURE: 151 MMHG | DIASTOLIC BLOOD PRESSURE: 67 MMHG

## 2022-11-28 VITALS — DIASTOLIC BLOOD PRESSURE: 66 MMHG | SYSTOLIC BLOOD PRESSURE: 161 MMHG

## 2022-11-28 VITALS — DIASTOLIC BLOOD PRESSURE: 69 MMHG | SYSTOLIC BLOOD PRESSURE: 151 MMHG

## 2022-11-28 VITALS — SYSTOLIC BLOOD PRESSURE: 139 MMHG | DIASTOLIC BLOOD PRESSURE: 73 MMHG

## 2022-11-28 LAB
BASOPHILS # BLD AUTO: 0 10^3/UL (ref 0–0.1)
BASOPHILS NFR BLD AUTO: 1 % (ref 0–10)
BUN/CREAT SERPL: 11
CALCIUM SERPL-MCNC: 7.3 MG/DL (ref 8.5–10.1)
CHLORIDE SERPL-SCNC: 109 MMOL/L (ref 98–107)
CK SERPL-CCNC: 889 U/L (ref 30–200)
CO2 SERPL-SCNC: 21 MMOL/L (ref 21–32)
CREAT SERPL-MCNC: 0.73 MG/DL (ref 0.6–1.3)
EOSINOPHIL # BLD AUTO: 0.1 10^3/UL (ref 0–0.3)
EOSINOPHIL NFR BLD AUTO: 2 % (ref 0–10)
GFR SERPLBLD BASED ON 1.73 SQ M-ARVRAT: 95 ML/MIN
GLUCOSE SERPL-MCNC: 105 MG/DL (ref 70–105)
HCT VFR BLD CALC: 27 % (ref 40–54)
HGB BLD-MCNC: 8.7 G/DL (ref 13.3–17.7)
LYMPHOCYTES # BLD AUTO: 0.7 10^3/UL (ref 1–4)
LYMPHOCYTES NFR BLD AUTO: 11 % (ref 12–44)
MAGNESIUM SERPL-MCNC: 1.6 MG/DL (ref 1.6–2.4)
MANUAL DIFFERENTIAL PERFORMED BLD QL: NO
MCH RBC QN AUTO: 36 PG (ref 25–34)
MCHC RBC AUTO-ENTMCNC: 32 G/DL (ref 32–36)
MCV RBC AUTO: 110 FL (ref 80–99)
MONOCYTES # BLD AUTO: 0.6 10^3/UL (ref 0–1)
MONOCYTES NFR BLD AUTO: 9 % (ref 0–12)
NEUTROPHILS # BLD AUTO: 4.9 10^3/UL (ref 1.8–7.8)
NEUTROPHILS NFR BLD AUTO: 78 % (ref 42–75)
PHOSPHATE SERPL-MCNC: 1.9 MG/DL (ref 2.3–4.7)
PLATELET # BLD: 209 10^3/UL (ref 130–400)
PMV BLD AUTO: 10.6 FL (ref 9–12.2)
POTASSIUM SERPL-SCNC: 4.1 MMOL/L (ref 3.6–5)
SODIUM SERPL-SCNC: 136 MMOL/L (ref 135–145)
WBC # BLD AUTO: 6.3 10^3/UL (ref 4.3–11)

## 2022-11-28 RX ADMIN — MAGNESIUM SULFATE IN DEXTROSE SCH MLS/HR: 10 INJECTION, SOLUTION INTRAVENOUS at 09:00

## 2022-11-28 RX ADMIN — IPRATROPIUM BROMIDE AND ALBUTEROL SULFATE SCH ML: .5; 3 SOLUTION RESPIRATORY (INHALATION) at 02:43

## 2022-11-28 RX ADMIN — DEXTROSE MONOHYDRATE, SODIUM CHLORIDE, AND POTASSIUM CHLORIDE SCH MLS/HR: 50; 4.5; 1.49 INJECTION, SOLUTION INTRAVENOUS at 09:00

## 2022-11-28 RX ADMIN — Medication SCH MG: at 09:22

## 2022-11-28 RX ADMIN — DEXTROSE MONOHYDRATE, SODIUM CHLORIDE, AND POTASSIUM CHLORIDE SCH MLS/HR: 50; 4.5; 1.49 INJECTION, SOLUTION INTRAVENOUS at 01:56

## 2022-11-28 RX ADMIN — DOCUSATE SODIUM SCH MG: 100 CAPSULE ORAL at 09:25

## 2022-11-28 RX ADMIN — POTASSIUM CHLORIDE SCH MEQ: 1500 TABLET, EXTENDED RELEASE ORAL at 08:51

## 2022-11-28 RX ADMIN — DOCUSATE SODIUM SCH MG: 100 CAPSULE ORAL at 19:20

## 2022-11-28 RX ADMIN — ENOXAPARIN SODIUM SCH MG: 100 INJECTION SUBCUTANEOUS at 08:55

## 2022-11-28 RX ADMIN — MAGNESIUM SULFATE IN DEXTROSE SCH MLS/HR: 10 INJECTION, SOLUTION INTRAVENOUS at 08:52

## 2022-11-28 RX ADMIN — NICOTINE SCH MG: 14 PATCH, EXTENDED RELEASE TRANSDERMAL at 05:12

## 2022-11-28 RX ADMIN — DEXTROSE MONOHYDRATE, SODIUM CHLORIDE, AND POTASSIUM CHLORIDE SCH MLS/HR: 50; 4.5; 1.49 INJECTION, SOLUTION INTRAVENOUS at 19:20

## 2022-11-28 RX ADMIN — POTASSIUM CHLORIDE SCH MLS/HR: 200 INJECTION, SOLUTION INTRAVENOUS at 08:51

## 2022-11-28 RX ADMIN — Medication SCH EA: at 09:22

## 2022-11-28 RX ADMIN — IPRATROPIUM BROMIDE AND ALBUTEROL SULFATE SCH ML: .5; 3 SOLUTION RESPIRATORY (INHALATION) at 15:19

## 2022-11-28 RX ADMIN — FOLIC ACID SCH MG: 1 TABLET ORAL at 09:22

## 2022-11-28 RX ADMIN — IPRATROPIUM BROMIDE AND ALBUTEROL SULFATE SCH ML: .5; 3 SOLUTION RESPIRATORY (INHALATION) at 10:14

## 2022-11-28 RX ADMIN — IPRATROPIUM BROMIDE AND ALBUTEROL SULFATE SCH ML: .5; 3 SOLUTION RESPIRATORY (INHALATION) at 21:31

## 2022-11-28 NOTE — ST DYSPHAGIA EVALUATION
Speech Evaluation-General


Medical Diagnosis


Dehydration


Onset Date:  Nov 26, 2022





Therapy Diagnosis


Therapy Diagnosis:  Intact Oropharyngeal Swallow





Precautions


Precautions:  Fall, Pressure Ulcer, Aspiration


Precautions/Isolations:  Aspiration, Fall Prevention, Standard Precautions, 

Pressure Ulcer





Referral


Referring Physician:  Dr. Contreras


Reason for Referral:  Evaluation/Treatment





Medical History


Current History


The patient is a 72 year-old male with a past medical history of a subarachnoid 

hemorrhage, ventriculoperitoneal shunt, COPD, and hepatitis C, who presented to 

[facility] following a fall at home.


Reviewed History:  Yes





Speech PLF/Current-Dysphagia


Prior Level of Function


The patient was unable to provide specific information regarding his prior level

of P.O. intake. The patient states, "I cough all the time. I cough a lot at n

ight."





Subjective


The patient was seated upright in his recliner, awake and alert, upon entrance 

to his room by the clinician. The patient greeted the clinician appropriately 

and was agreeable to participation in clinical bedside swallowing assessment.





The patient frequently displays tangential communication, appearing confused 

throughout the evaluation.





Cognitive Status


Patient Orientation:  Person, Place





Oral Motor Skills


Dentition:  Natural (Sparse, three visualized.)


Ability to Follow Directions:  Fair


Oral Expression Ability:  Moderate Impairment





Voice


Voice Phonatory-Based Quality:  Harsh


Voice Pitch:  Normal


Voice Loudness:  Normal





Face


Facial Symmetry:  Symmetrical (At rest.)





Oral-Facial Assessment


Oral-Facial Dentition:  Normal


Labial Seal Description:  Normal


Smile:  Normal


Puff Cheeks:  Normal


Lingual Protrusion:  Normal


Lingual ROM:  Normal


Lingual Strength:  Abnormal (Mildly reduced lingual strength is suspected.)


Volitional Dry Swallow:  Yes


Voluntary Cough:  Yes


Can Clear Throat Volitionally:  Yes


Productive Cough:  Yes


Productive Throat Clear:  Yes





Dysphagia Evaluation


Consistencies Presented:  Regular, Thin Liquid, Mechanical Soft, Pureed


Oral Phase:  Oral Residue (Solid.)


The patient was able to accept the bolus from the teaspoon and straw 

appropriately. Due to sparse dentition, prolonged mastication of the solid 

consistency was displayed. With the aid of applesauce, the patient was able to 

form a bolus with the solid consistency and complete appropriate posterior 

transfer.


Laryngeal elevation was present to palpation. A timely pharyngeal swallow 

response was suspected.


The patient consumed ice chips, multiple teaspoons, multiple single straw 

drinks, multiple consecutive straw drinks, three ounces of puree, and a dry 

solid cracker. Overt s/s of suspected aspiration were not demonstrated 

throughout the evaluation with any consistency tested. The patient's vocal 

quality remained consistently clear following each swallow.


Dietary Recommendations:  Mechanical Soft (MM5)


Liquid Recommendations:  Thin





Recommendations:


- MM5 (Minced and Moist) with thin liquids, as tolerated.


- Fully upright and alert for P.O. intake.


- Set up assistance and intermittent feeding aid throughout P.O. intake. 


   - The patient attempted to self feed throughout the evaluation. Initially, 

the patient displayed appropriate ability, however, with additional P.O. trials 

the patient displayed fatigue with the inability to bring the spoon to the oral 

cavity.


- Small bites and sips.


- Crush medication and place in P.O. intake.


- Monitor for s/s of suspected aspiration with P.O. intake. If demonstrated re-

consult speech pathology.





The results and recommendations were shared with the patient and the patient's 

RN immediately following completion of the assessment.


Dysphagia Evaluation Summary


The patient demonstrated an intact oropharyngeal swallowing function. Prolonged 

mastication was appreciated with dry, solid consistencies. Mastication displayed

improved timeliness with soft, solid consistencies. Overt s/s of suspected 

aspiration were not displayed with thin liquids, puree, or solid consistencies 

tested.





Speech-Plan


Treatment Plan


Speech Therapy Treatment Plan:  Discontinue ST


Treatment Duration:  Nov 28, 2022


Frequency:  1 time per week


Estimated Hrs Per Day:  .25 hour per day


Rehab Potential:  Fair





Safety Risks/Education


Teaching Recipient:  Patient


Teaching Methods:  Discussion


Response to Teaching:  Reinforcement Needed


Education Topics Provided:  


Results, Recommendations, Plan of Care, Safe Swallowing Procedures and


Practices





Time


Speech Therapy Time In:  09:08


Speech Therapy Time Out:  09:25


DATE:  Nov 28, 2022


Total Billed Time:  17


Billed Treatment Time


1, RADHA JULIO ELIZABETH ST               Nov 28, 2022 10:06

## 2022-11-28 NOTE — PROGRESS NOTE - HOSPITALIST
Subjective


HPI/CC On Admission


Date Seen by Provider:  Nov 28, 2022


Catrachito Angel is a 72 year old male with PMH subarachnoid hemorrhage, 

ventriculoperitoneal shunt, COPD, hepatitis C, who presented after a fall at 

home. He lives by himself. His neighbors found him down on the floor in the 

kitchen. He reports that he lost his balance. He is a poor historian. He denies 

pain. He denies shortness of breath. He denies abdominal pain, nausea, and vomit

ing. He is awake and alert. He is oriented to person, place, month, and year. He

is disoriented to city and thinks he is in [place]. We discussed his goals

of care and he says he does not want CPR or a breathing machine. He wants to be 

DNR/DNI. He says he wants to go home and smoke cigarettes and drink whiskey. We 

attempted to call his DPOA, Steven, but he did not answer.


Subjective/Events-last exam


Pt reports doing well but feeling weak still. Interested in possibly going back 

to [facility] for therapy.





Focused Exam


Lactate Level


11/26/22 18:41: Lactic Acid Level 2.83*H


11/27/22 07:40: Lactic Acid Level 1.14


11/28/22 07:06: Lactic Acid Level 1.37





Lactic Acid Level








Objective


Exam


Vital Signs





Vital Signs








  Date Time  Temp Pulse Resp B/P (MAP) Pulse Ox O2 Delivery O2 Flow Rate FiO2


 


11/28/22 11:43 36.8 90 18 147/66 (93) 98 Nasal Cannula 2.00 





Capillary Refill : Less Than 3 Seconds


General Appearance:  No Apparent Distress, Chronically ill


Respiratory:  Lungs Clear, No Respiratory Distress


Cardiovascular:  Regular Rate, Rhythm, No Murmur


Neurologic/Psychiatric:  Alert, Oriented x3





Results/Procedures


Lab


Laboratory Tests


11/28/22 06:30








Patient resulted labs reviewed.


Imaging:  Reviewed Imaging Films, Reviewed Imaging Report





Assessment/Plan


Assessment and Plan


Assess & Plan/Chief Complaint


Dehydration


Fall from ground level


Rhabdomyolysis


Lactic acidosis


   IV fluids- decreased rate


   CK trended back down today


   PT/OT


   Social work consulted- possible SNF placement


   Palliative care consulted





Malnutrition


Hypoalbuminemia


Dysphagia


   ST consult- Minced and moist ordered


   Monitor electrolytes





Hypoglycemia


High anion gap metabolic acidosis


   Not diabetic


   D5 1/2 NS w/20KCl


   Possibly due to prolonged starvation


   BS improved- continue accu checks





SIRS


   leukocytosis resolved


   CXR, UA, blood cultures negative


   No infectious source identified


   Antibiotics deferred





Alcohol abuse


Tobacco abuse


   Nicotine patch


   Monitor for withdrawal symptoms


   Add multivitamin, folate, thiamine





Macrocytic anemia


   Mild anemia


   Possibly due to alcohol use


   pending folic acid and B12 levels





History of subarachnoid hemorrhage


Ventriculoperitoneal shunt


   Clinically significant, no acute management needs





DVT prophylaxis: RONI Hernandez MD              Nov 28, 2022 10:48

## 2022-11-28 NOTE — OCCUPATIONAL THERAPY EVAL
OT Evaluation-General/PLF


Medical Diagnosis


Admission Date


Nov 27, 2022 at 16:39


Medical Diagnosis:  Dehydration


Onset Date:  Nov 26, 2022





Therapy Diagnosis


Therapy Diagnosis:  decreased ADL status





Precautions


Precautions/Isolations:  Aspiration, Fall Prevention, Standard Precautions, 

Pressure Ulcer





Referral


Physician:  Ben


Referral Reason:  Evaluation/Treatment





Medical History


Pertinent Medical History:  Alcoholism, COPD, Smoking


Additional Medical History


subarachnoid hemorrhage, verntriculoperitoneal shunt, COPD, Hepatitis C


Current History


presents to ED after fall at home, found down by neighbor.





Social History


Home:  Single Level


Current Living Status:  Alone





ADL-Prior Level of Function


SCALE: Activities may be completed with or without assistive devices.





6-Indepedent-patient completes the activity by him/herself with no assistance 

from a helper.


5-Set-up or Clean-up Assistance-helper sets up or cleans up; patient completes 

activity. [place] assists only prior to or  


    following the activity.


4-Supervision or Touching Assistance-helper provides verbal cues and/or 

touching/steadying and/or contact guard assistance as patient completes acti

vity. Assistance may be provided   


    throughout the activity or intermittently.


3-Partial/Moderate Assistance-helper does LESS THAN HALF the effort. [place] 

lifts, holds or supports trunk or limbs, but provides less than half the effort.


2-Substantial/Maximal Assistance-helper does MORE THAN HALF the effort. [place] 

lifts or holds trunk or limbs and provides more than half the effort.


4-Alzfhmdee-eboneb does ALL the effort. Patient does none of the effort to 

complete the activity. Or, the assistance of 2 or more helpers is required for 

the patient to complete the  


    activity.


If activity was not attempted, code reason:


7-Patient Refused.


9-Not Applicable-not attempted and the patient did not perform the activity 

before the current illness, exacerbation or injury.


10-Not Attempted due to Environmental Limitations-(lack of equipment, weather 

restraints, etc.).


88-Not Attempted due to Medical Conditions or Safety Concerns.


ADL PLOF Comments


Pt states he was independent with ADLS and functional mobility at [facility], using a 

walker.


Self Care:  Independent


Functional Cognition:  Independent





OT Current Status


Subjective


Pt in recliner, states he feels "like a train wreck", hurting all over. He 

doesn't provide pain rating. Pt feels like he is unable to do anything for 

himself. OT encouraged pt about him walking with PT, but pt was under the 

impression that PT did everything for him and he was unable to walk himself. Pt 

appears confused throughout tx.





Current


Hand Dominance:  Right


Upper Extremity ROM


RUE shoulder flexion to approx 90 degrees, LUE shoulder flexion to approx 110 

degrees.





ADL-Treatment


Shower/Bathe Self (QC):  7


Lower Body Dressing (QC):  1 (pt would require assistance threading pants over 

feet.)


On/Off Footwear (QC):  1 (pt unable to reach feet)


Toileting Hygiene (QC):  7





Other Treatments


Pt in recliner, agreeable to OT tx with some encouragement. Pt feels like he is 

unable to do anything for himself, but also self limits and doesn't attempt 

tasks when asked. OT asked pt to complete footwear, he immediately states he is 

unable to. With encouragement, pt attempts to reach forward, but only able to 

reach around knee level. OT educated pt on UE exercises in order to increase BUE

Strength and activity tolerance, pt required encouragement to complete. Pt 

completed x5 reps BUE shoulder flexion and front punch. Pt encouraged to 

complete exercises throughout the day, he verbalized understanding. Post tx, pt 

in recliner, call light in reach and all needs met.





Education


OT Patient Education:  Correct positioning, Energy conservation, Modified ADL 

techniques, Progress toward Goal/Update tx plan, Purpose of tx/functional 

activities, Rehab process


Teaching Recipient:  Patient


Teaching Methods:  Discussion


Response to Teaching:  Verbalize Understanding





OT Long Term Goals


Long Term Goals


Time Frame:  Dec 9, 2022


Eating (QC):  6


Oral Hygiene (QC):  6


Toileting Hygiene (QC):  6


Shower/Bathe Self (QC):  4


Upper Body Dressing (QC):  5


Lower Body Dressing (QC):  4


On/Off Footwear (QC):  4


Additional Goals:  1-Demonstrate ADL Tasks, 2-Verbalize Understanding, 3-

ImproveStrength/Judith


1=Demonstrate adherence to instructed precautions during ADL tasks.


2=Patient will verbalize/demonstrate understanding of assistive dev

ices/modifications for ADL.


3=Patient will improve strength/tolerance for activity to enable patient to 

perform ADL's.





OT Education/Plan


Problem List/Assessment


Assessment:  Decreased Activ Tolerance, Decreased Safety Aware, Decreased UE 

Strength, Impaired Cognition, Impaired Funct Balance, Impaired I ADL's, Impaired

Self-Care Skills





Discharge Recommendations


Plan/Recommendations:  Continue POC





Treatment Plan/Plan of Care


Patient would benefit from OT for education, treatment and training to promote 

independence in ADL's, mobility, safety and/or upper extremity function for 

ADL's.


Plan of Care:  ADL Retraining, Functional Mobility, UE Funct Exercise/Act


Treatment Duration:  Dec 9, 2022


Frequency:  3 times per week (3-5 times per week)


Estimated Hrs Per Day:  .25 hour per day


Rehab Potential:  Fair





Time


Start Time:  11:02


Stop Time:  11:13


DATE:  Nov 28, 2022


Total Time Billed (hr/min):  11


Billed Treatment Time


1, ROMAN LIANG OT          Nov 28, 2022 11:28

## 2022-11-29 VITALS — DIASTOLIC BLOOD PRESSURE: 71 MMHG | SYSTOLIC BLOOD PRESSURE: 157 MMHG

## 2022-11-29 VITALS — DIASTOLIC BLOOD PRESSURE: 72 MMHG | SYSTOLIC BLOOD PRESSURE: 160 MMHG

## 2022-11-29 VITALS — SYSTOLIC BLOOD PRESSURE: 139 MMHG | DIASTOLIC BLOOD PRESSURE: 66 MMHG

## 2022-11-29 VITALS — DIASTOLIC BLOOD PRESSURE: 69 MMHG | SYSTOLIC BLOOD PRESSURE: 149 MMHG

## 2022-11-29 VITALS — DIASTOLIC BLOOD PRESSURE: 66 MMHG | SYSTOLIC BLOOD PRESSURE: 138 MMHG

## 2022-11-29 VITALS — SYSTOLIC BLOOD PRESSURE: 147 MMHG | DIASTOLIC BLOOD PRESSURE: 67 MMHG

## 2022-11-29 LAB
BASOPHILS # BLD AUTO: 0 10^3/UL (ref 0–0.1)
BASOPHILS NFR BLD AUTO: 0 % (ref 0–10)
BUN/CREAT SERPL: 14
CALCIUM SERPL-MCNC: 7.4 MG/DL (ref 8.5–10.1)
CHLORIDE SERPL-SCNC: 104 MMOL/L (ref 98–107)
CO2 SERPL-SCNC: 25 MMOL/L (ref 21–32)
CREAT SERPL-MCNC: 0.65 MG/DL (ref 0.6–1.3)
EOSINOPHIL # BLD AUTO: 0.2 10^3/UL (ref 0–0.3)
EOSINOPHIL NFR BLD AUTO: 4 % (ref 0–10)
GFR SERPLBLD BASED ON 1.73 SQ M-ARVRAT: 99 ML/MIN
GLUCOSE SERPL-MCNC: 94 MG/DL (ref 70–105)
HCT VFR BLD CALC: 26 % (ref 40–54)
HGB BLD-MCNC: 8.4 G/DL (ref 13.3–17.7)
LYMPHOCYTES # BLD AUTO: 0.8 10^3/UL (ref 1–4)
LYMPHOCYTES NFR BLD AUTO: 14 % (ref 12–44)
MAGNESIUM SERPL-MCNC: 1.8 MG/DL (ref 1.6–2.4)
MANUAL DIFFERENTIAL PERFORMED BLD QL: NO
MCH RBC QN AUTO: 35 PG (ref 25–34)
MCHC RBC AUTO-ENTMCNC: 32 G/DL (ref 32–36)
MCV RBC AUTO: 109 FL (ref 80–99)
MONOCYTES # BLD AUTO: 0.5 10^3/UL (ref 0–1)
MONOCYTES NFR BLD AUTO: 9 % (ref 0–12)
NEUTROPHILS # BLD AUTO: 4 10^3/UL (ref 1.8–7.8)
NEUTROPHILS NFR BLD AUTO: 73 % (ref 42–75)
PHOSPHATE SERPL-MCNC: 2.2 MG/DL (ref 2.3–4.7)
PLATELET # BLD: 203 10^3/UL (ref 130–400)
PMV BLD AUTO: 11.4 FL (ref 9–12.2)
POTASSIUM SERPL-SCNC: 4.7 MMOL/L (ref 3.6–5)
SODIUM SERPL-SCNC: 133 MMOL/L (ref 135–145)
WBC # BLD AUTO: 5.5 10^3/UL (ref 4.3–11)

## 2022-11-29 RX ADMIN — IPRATROPIUM BROMIDE AND ALBUTEROL SULFATE SCH ML: .5; 3 SOLUTION RESPIRATORY (INHALATION) at 20:16

## 2022-11-29 RX ADMIN — NICOTINE SCH MG: 14 PATCH, EXTENDED RELEASE TRANSDERMAL at 08:38

## 2022-11-29 RX ADMIN — FOLIC ACID SCH MG: 1 TABLET ORAL at 08:41

## 2022-11-29 RX ADMIN — IPRATROPIUM BROMIDE AND ALBUTEROL SULFATE SCH ML: .5; 3 SOLUTION RESPIRATORY (INHALATION) at 15:59

## 2022-11-29 RX ADMIN — POTASSIUM CHLORIDE SCH MLS/HR: 200 INJECTION, SOLUTION INTRAVENOUS at 06:48

## 2022-11-29 RX ADMIN — POTASSIUM CHLORIDE SCH MEQ: 1500 TABLET, EXTENDED RELEASE ORAL at 06:48

## 2022-11-29 RX ADMIN — Medication SCH EA: at 06:34

## 2022-11-29 RX ADMIN — DOCUSATE SODIUM SCH MG: 100 CAPSULE ORAL at 19:59

## 2022-11-29 RX ADMIN — DOCUSATE SODIUM SCH MG: 100 CAPSULE ORAL at 08:38

## 2022-11-29 RX ADMIN — ENOXAPARIN SODIUM SCH MG: 100 INJECTION SUBCUTANEOUS at 08:41

## 2022-11-29 RX ADMIN — IPRATROPIUM BROMIDE AND ALBUTEROL SULFATE SCH ML: .5; 3 SOLUTION RESPIRATORY (INHALATION) at 03:44

## 2022-11-29 RX ADMIN — DEXTROSE MONOHYDRATE, SODIUM CHLORIDE, AND POTASSIUM CHLORIDE SCH MLS/HR: 50; 4.5; 1.49 INJECTION, SOLUTION INTRAVENOUS at 13:21

## 2022-11-29 RX ADMIN — MAGNESIUM SULFATE IN DEXTROSE SCH MLS/HR: 10 INJECTION, SOLUTION INTRAVENOUS at 06:48

## 2022-11-29 RX ADMIN — IPRATROPIUM BROMIDE AND ALBUTEROL SULFATE SCH ML: .5; 3 SOLUTION RESPIRATORY (INHALATION) at 09:05

## 2022-11-29 RX ADMIN — Medication SCH MG: at 06:34

## 2022-11-29 RX ADMIN — DEXTROSE MONOHYDRATE, SODIUM CHLORIDE, AND POTASSIUM CHLORIDE SCH MLS/HR: 50; 4.5; 1.49 INJECTION, SOLUTION INTRAVENOUS at 06:50

## 2022-11-29 NOTE — PROGRESS NOTE - HOSPITALIST
Subjective


HPI/CC On Admission


Date Seen by Provider:  Nov 29, 2022


Catrachito Angel is a 72 year old male with PMH subarachnoid hemorrhage, 

ventriculoperitoneal shunt, COPD, hepatitis C, who presented after a fall at 

home. He lives by himself. His neighbors found him down on the floor in the 

kitchen. He reports that he lost his balance. He is a poor historian. He denies 

pain. He denies shortness of breath. He denies abdominal pain, nausea, and vomit

ing. He is awake and alert. He is oriented to person, place, month, and year. He

is disoriented to city and thinks he is in [place]. We discussed his goals

of care and he says he does not want CPR or a breathing machine. He wants to be 

DNR/DNI. He says he wants to go home and smoke cigarettes and drink whiskey. We 

attempted to call his DPOA, Steven, but he did not answer.


Subjective/Events-last exam


Pt reports doing well. No complaints.





Focused Exam


Lactate Level


11/26/22 18:41: Lactic Acid Level 2.83*H


11/27/22 07:40: Lactic Acid Level 1.14


11/28/22 07:06: Lactic Acid Level 1.37








Objective


Exam


Vital Signs





Vital Signs








  Date Time  Temp Pulse Resp B/P (MAP) Pulse Ox O2 Delivery O2 Flow Rate FiO2


 


11/29/22 09:05     95 Nasal Cannula 2.50 


 


11/29/22 08:04 37.3 81 18 139/66 (90)    


 


11/29/22 03:44        21





Capillary Refill : Less Than 3 Seconds


General Appearance:  No Apparent Distress, Chronically ill, Thin


Respiratory:  Lungs Clear, No Respiratory Distress


Cardiovascular:  Regular Rate, Rhythm, No Murmur


Neurologic/Psychiatric:  Alert, Oriented x3





Results/Procedures


Lab


Laboratory Tests


11/29/22 05:55








Patient resulted labs reviewed.


Imaging:  Reviewed Imaging Films, Reviewed Imaging Report





Assessment/Plan


Assessment and Plan


Assess & Plan/Chief Complaint


Dehydration


Fall from ground level


Rhabdomyolysis


Lactic acidosis


   IV fluids


   PT/OT


   Social work consulted- possible SNF placement


   Palliative care consulted


   AMY Castellano


   Looking at SNF placement





Malnutrition


Hypoalbuminemia


Dysphagia


   ST consult- Minced and moist ordered


   Monitor electrolytes





Hypoglycemia


High anion gap metabolic acidosis


   Not diabetic


   D5 1/2 NS w/20KCl


   Possibly due to prolonged starvation





SIRS


   leukocytosis resolved


   CXR, UA, blood cultures negative


   No infectious source identified


   Antibiotics deferred





Alcohol abuse


Tobacco abuse


   Nicotine patch


   Monitor for withdrawal symptoms


   Continue multivitamin, folate, thiamine





Macrocytic anemia


   Mild anemia


   Possibly due to alcohol use


   Normal to high folic acid and B12 levels





History of subarachnoid hemorrhage


Ventriculoperitoneal shunt


   Clinically significant, no acute management needs





DVT prophylaxis: RONI Hernandez MD             Nov 29, 2022 10:27 am

## 2022-11-29 NOTE — PHYSICAL THERAPY DAILY NOTE
PT Daily Note-Current


Subjective


Patient reluctantly agrees to PT.





Pain





   Numeric Pain Scale:  10-Worst Possible Pain


   Location:  Joint


   Location Body Site:  Generalized


   Pain Description:  Acute


Section J - Health Conditions


1. Rarely or not at all


2. Occasionally


3. Frequently


4. Almost constantly


8. Unable to answer


Pain Effect on Sleep:  3


Pain Interference with Therapy:  3


Pain Interference w/Day-to-Day:  3





Mental Status


Patient Orientation:  Normal For Age


Attachments:  Oxygen, Castellano Catheter, IV





Transfers


SCALE: Activities may be completed with or without assistive devices.





6-Indepedent-patient completes the activity by him/herself with no assistance 

from a helper.


5-Set-up or Clean-up Assistance-helper sets up or cleans up; patient completes 

activity. [place] assists only prior to or  


    following the activity.


4-Supervision or Touching Assistance-helper provides verbal cues and/or 

touching/steadying and/or contact guard assistance as patient completes activi

ty. Assistance may be provided   


    throughout the activity or intermittently.


3-Partial/Moderate Assistance-helper does LESS THAN HALF the effort. Castella 

lifts, holds or supports trunk or limbs, but provides less than half the effort.


2-Substantial/Maximal Assistance-helper does MORE THAN HALF the effort. [place] 

lifts or holds trunk or limbs and provides more than half the effort.


1-Okfefxsqp-hkyyzo does ALL the effort. Patient does none of the effort to 

complete the activity. Or, the assistance of 2 or more helpers is required for 

the patient to complete the  


    activity.


If activity was not attempted, code reason:


7-Patient Refused.


9-Not Applicable-not attempted and the patient did not perform the activity 

before the current illness, exacerbation or injury.


10-Not Attempted due to Environmental Limitations-(lack of equipment, weather 

restraints, etc.).


88-Not Attempted due to Medical Conditions or Safety Concerns.


Lying to Sitting/Side of Bed(Q:  3


Sit to Stand (QC):  3


Chair/Bed-to-Chair Xfer(QC):  3





Weight Bearing


Right Lower Extremity:  Right


Weight Bearing/Tolerated


Left Lower Extremity:  Left


Weight Bearing/Tolerated





Gait Training


Distance:  10' x 2


Walk 10 feet (QC):  3


Gait Assistive Device:  FWW





Assessment


Patient ceases treatment due to SOA and fatigue.  Patient is up in recliner with

chair alarm activated.  Increase activity as tolerated/allowed by patient.





PT Long Term Goals


Long Term Goals


PT Long Term Goals Time Frame:  Dec 10, 2022


Roll Left & Right (QC):  6


Sit to Lying (QC):  6


Lying-Sitting on Side/Bed(QC):  6


Sit to Stand (QC):  6


Chair/Bed-to-Chair Xfer(QC):  6


Toilet Transfer (QC):  6


Walk 10 feet (QC):  4


Walk 50ft with 2 Turns (QC):  4


Walk 150 ft (QC):  4





PT Plan


Treatment/Plan


Treatment Plan:  Continue Plan of Care


Treatment Plan:  Bed Mobility, Education, Functional Activity Judith, Functional 

Strength, Gait, Safety, Therapeutic Exercise, Transfers


Treatment Duration:  Dec 10, 2022


Frequency:  6 times per week


Estimated Hrs Per Day:  .25 hour per day





Time


Time In:  850


Time Out:  902


DATE:  Nov 29, 2022


Total Billed Treatment Time:  12


Total Billed Treatment


1 visit


FA 12 min











REID DYSON PT              Nov 29, 2022 09:46

## 2022-11-29 NOTE — OCCUPATIONAL THER DAILY NOTE
OT Current Status-Daily Note


Subjective


No pain reported.





Mental Status/Objective


Patient Orientation:  Person


Attachments:  Castellano Catheter, IV, Oxygen





ADL-Treatment


Therapy Code Descriptions/Definitions 





Functional Stitzer Measure:


0=Not Assessed/NA        4=Minimal Assistance


1=Total Assistance        5=Supervision or Setup


2=Maximal Assistance  6=Modified Stitzer


3=Moderate Assistance 7=Complete IndependenceSCALE: Activities may be completed 

with or without assistive devices.





6-Indepedent-patient completes the activity by him/herself with no assistance 

from a helper.


5-Set-up or Clean-up Assistance-helper sets up or cleans up; patient completes 

activity. [place] assists only prior to or  


    following the activity.


4-Supervision or Touching Assistance-helper provides verbal cues and/or 

touching/steadying and/or contact guard assistance as patient completes 

activity. Assistance may be provided   


    throughout the activity or intermittently.


3-Partial/Moderate Assistance-helper does LESS THAN HALF the effort. [place] 

lifts, holds or supports trunk or limbs, but provides less than half the effort.


2-Substantial/Maximal Assistance-helper does MORE THAN HALF the effort. [place] 

lifts or holds trunk or limbs and provides more than half the effort.


2-Rudhoqcam-nmyacl does ALL the effort. Patient does none of the effort to com

plete the activity. Or, the assistance of 2 or more helpers is required for the 

patient to complete the  


    activity.


If activity was not attempted, code reason:


7-Patient Refused.


9-Not Applicable-not attempted and the patient did not perform the activity 

before the current illness, exacerbation or injury.


10-Not Attempted due to Environmental Limitations-(lack of equipment, weather 

restraints, etc.).


88-Not Attempted due to Medical Conditions or Safety Concerns.


Pt. up in chair.  He is pleasant and agrees to work with OT.  Pt. adamently 

declines getting cleaned up, brushing teeth, or brushing hair.  States, "I don't

want to mess with that right now."  Pt. does agree to work on his transfers, but

after encouragement from OT.  Pt. practices standing twice from chair, sit-stand

with min the first time and mod assist the second.  He seems confused as to 

where to put his hands, or how to sequence the steps.  He stands each time 

approximately 45seconds -1minute.  With cues he reaches back to sit.  Pt. seems 

SOA when standing, but this seems more anxiety than actual SOA.  Pt. has 

difficulty attending at times, as he is concerned with the precise positioning 

of each thing in the room.  He asks the OT to move a different chair to a 

different location, and can't focus on standing until OT does.  Once he is back 

in his chair he has difficulty focusing because his bedside table has been 

moved.  He tells OT in exact detail where to move each thing.  OT sets pt's 

chair alarm.  Pt. has call light, water, and all needs are met at end of 

session.





Education


OT Patient Education:  Correct positioning, Progress toward Goal/Update tx plan,

Purpose of tx/functional activities, Reviewed precautions, Rehab process, 

Transfer techniques


Teaching Recipient:  Patient


Teaching Methods:  Demonstration, Discussion


Response to Teaching:  Verbalize Understanding, Return Demonstration





OT Long Term Goals


Long Term Goals


Time Frame:  Dec 9, 2022


Eating (QC):  6


Oral Hygiene (QC):  6


Toileting Hygiene (QC):  6


Shower/Bathe Self (QC):  4


Upper Body Dressing (QC):  5


Lower Body Dressing (QC):  4


On/Off Footwear (QC):  4


Additional Goals:  1-Demonstrate ADL Tasks, 2-Verbalize Understanding, 3-

ImproveStrength/Judith


1=Demonstrate adherence to instructed precautions during ADL tasks.


2=Patient will verbalize/demonstrate understanding of assistive 

devices/modifications for ADL.


3=Patient will improve strength/tolerance for activity to enable patient to 

perform ADL's.





OT Education/Plan


Problem List/Assessment


Assessment:  Decreased Activ Tolerance, Decreased UE Strength, Dependent 

Transfers, Impaired Cognition, Impaired Funct Balance, Impaired I ADL's, 

Impaired Self-Care Skills





Discharge Recommendations


Plan/Recommendations:  Continue POC


Therapy Discharge Recommendati:  Post Acute OT





Treatment Plan/Plan of Care


Treatment,Training & Education:  Yes


Patient would benefit from OT for education, treatment and training to promote 

independence in ADL's, mobility, safety and/or upper extremity function for 

ADL's.


Plan of Care:  ADL Retraining, Functional Mobility, UE Funct Exercise/Act


Treatment Duration:  Dec 9, 2022


Frequency:  3 times per week (3-5 times per week)


Estimated Hrs Per Day:  .25 hour per day


Rehab Potential:  Fair





Time


Start Time:  10:07


Stop Time:  10:23


DATE:  Nov 29, 2022


Total Time Billed (hr/min):  16


Billed Treatment Time


1, FA











REGINE WOODS OT           Nov 29, 2022 11:14

## 2022-11-30 VITALS — SYSTOLIC BLOOD PRESSURE: 159 MMHG | DIASTOLIC BLOOD PRESSURE: 73 MMHG

## 2022-11-30 VITALS — DIASTOLIC BLOOD PRESSURE: 67 MMHG | SYSTOLIC BLOOD PRESSURE: 150 MMHG

## 2022-11-30 VITALS — SYSTOLIC BLOOD PRESSURE: 143 MMHG | DIASTOLIC BLOOD PRESSURE: 63 MMHG

## 2022-11-30 VITALS — DIASTOLIC BLOOD PRESSURE: 71 MMHG | SYSTOLIC BLOOD PRESSURE: 142 MMHG

## 2022-11-30 VITALS — SYSTOLIC BLOOD PRESSURE: 131 MMHG | DIASTOLIC BLOOD PRESSURE: 87 MMHG

## 2022-11-30 VITALS — SYSTOLIC BLOOD PRESSURE: 184 MMHG | DIASTOLIC BLOOD PRESSURE: 81 MMHG

## 2022-11-30 VITALS — SYSTOLIC BLOOD PRESSURE: 156 MMHG | DIASTOLIC BLOOD PRESSURE: 69 MMHG

## 2022-11-30 VITALS — SYSTOLIC BLOOD PRESSURE: 169 MMHG | DIASTOLIC BLOOD PRESSURE: 71 MMHG

## 2022-11-30 LAB
BASOPHILS # BLD AUTO: 0 10^3/UL (ref 0–0.1)
BASOPHILS NFR BLD AUTO: 1 % (ref 0–10)
BUN/CREAT SERPL: 15
CALCIUM SERPL-MCNC: 7.5 MG/DL (ref 8.5–10.1)
CHLORIDE SERPL-SCNC: 100 MMOL/L (ref 98–107)
CO2 SERPL-SCNC: 22 MMOL/L (ref 21–32)
CREAT SERPL-MCNC: 0.6 MG/DL (ref 0.6–1.3)
EOSINOPHIL # BLD AUTO: 0.2 10^3/UL (ref 0–0.3)
EOSINOPHIL NFR BLD AUTO: 3 % (ref 0–10)
GFR SERPLBLD BASED ON 1.73 SQ M-ARVRAT: 101 ML/MIN
GLUCOSE SERPL-MCNC: 79 MG/DL (ref 70–105)
HCT VFR BLD CALC: 27 % (ref 40–54)
HGB BLD-MCNC: 8.8 G/DL (ref 13.3–17.7)
LYMPHOCYTES # BLD AUTO: 0.6 10^3/UL (ref 1–4)
LYMPHOCYTES NFR BLD AUTO: 11 % (ref 12–44)
MAGNESIUM SERPL-MCNC: 1.7 MG/DL (ref 1.6–2.4)
MANUAL DIFFERENTIAL PERFORMED BLD QL: NO
MCH RBC QN AUTO: 35 PG (ref 25–34)
MCHC RBC AUTO-ENTMCNC: 32 G/DL (ref 32–36)
MCV RBC AUTO: 107 FL (ref 80–99)
MONOCYTES # BLD AUTO: 0.5 10^3/UL (ref 0–1)
MONOCYTES NFR BLD AUTO: 9 % (ref 0–12)
NEUTROPHILS # BLD AUTO: 4.1 10^3/UL (ref 1.8–7.8)
NEUTROPHILS NFR BLD AUTO: 75 % (ref 42–75)
PHOSPHATE SERPL-MCNC: 2.6 MG/DL (ref 2.3–4.7)
PLATELET # BLD: 169 10^3/UL (ref 130–400)
PMV BLD AUTO: 10.8 FL (ref 9–12.2)
POTASSIUM SERPL-SCNC: 4.7 MMOL/L (ref 3.6–5)
SODIUM SERPL-SCNC: 131 MMOL/L (ref 135–145)
WBC # BLD AUTO: 5.5 10^3/UL (ref 4.3–11)

## 2022-11-30 RX ADMIN — DOCUSATE SODIUM SCH MG: 100 CAPSULE ORAL at 21:00

## 2022-11-30 RX ADMIN — Medication SCH EA: at 08:02

## 2022-11-30 RX ADMIN — NICOTINE SCH MG: 14 PATCH, EXTENDED RELEASE TRANSDERMAL at 09:35

## 2022-11-30 RX ADMIN — MAGNESIUM SULFATE IN DEXTROSE SCH MLS/HR: 10 INJECTION, SOLUTION INTRAVENOUS at 07:58

## 2022-11-30 RX ADMIN — FOLIC ACID SCH MG: 1 TABLET ORAL at 09:37

## 2022-11-30 RX ADMIN — IPRATROPIUM BROMIDE AND ALBUTEROL SULFATE SCH ML: .5; 3 SOLUTION RESPIRATORY (INHALATION) at 09:53

## 2022-11-30 RX ADMIN — ASCORBIC ACID, VITAMIN A PALMITATE, CHOLECALCIFEROL, THIAMINE HYDROCHLORIDE, RIBOFLAVIN-5 PHOSPHATE SODIUM, PYRIDOXINE HYDROCHLORIDE, NIACINAMIDE, DEXPANTHENOL, ALPHA-TOCOPHEROL ACETATE, VITAMIN K1, FOLIC ACID, BIOTIN, CYANOCOBALAMIN SCH MG: 200; 3300; 200; 6; 3.6; 6; 40; 15; 10; 150; 600; 60; 5 INJECTION, SOLUTION INTRAVENOUS at 21:41

## 2022-11-30 RX ADMIN — DOCUSATE SODIUM SCH MG: 100 CAPSULE ORAL at 09:35

## 2022-11-30 RX ADMIN — ENOXAPARIN SODIUM SCH MG: 100 INJECTION SUBCUTANEOUS at 09:37

## 2022-11-30 RX ADMIN — POTASSIUM CHLORIDE SCH MEQ: 1500 TABLET, EXTENDED RELEASE ORAL at 07:58

## 2022-11-30 RX ADMIN — POTASSIUM CHLORIDE SCH MLS/HR: 200 INJECTION, SOLUTION INTRAVENOUS at 07:57

## 2022-11-30 RX ADMIN — IPRATROPIUM BROMIDE AND ALBUTEROL SULFATE SCH ML: .5; 3 SOLUTION RESPIRATORY (INHALATION) at 21:28

## 2022-11-30 RX ADMIN — IPRATROPIUM BROMIDE AND ALBUTEROL SULFATE SCH ML: .5; 3 SOLUTION RESPIRATORY (INHALATION) at 02:49

## 2022-11-30 RX ADMIN — DEXTROSE MONOHYDRATE, SODIUM CHLORIDE, AND POTASSIUM CHLORIDE SCH MLS/HR: 50; 4.5; 1.49 INJECTION, SOLUTION INTRAVENOUS at 03:07

## 2022-11-30 RX ADMIN — DEXTROSE MONOHYDRATE, SODIUM CHLORIDE, AND POTASSIUM CHLORIDE SCH MLS/HR: 50; 4.5; 1.49 INJECTION, SOLUTION INTRAVENOUS at 16:36

## 2022-11-30 RX ADMIN — Medication SCH MG: at 08:02

## 2022-11-30 NOTE — PROGRESS NOTE - HOSPITALIST
Subjective


HPI/CC On Admission


Date Seen by Provider:  Nov 30, 2022


Catrachito Angel is a 72 year old male with PMH subarachnoid hemorrhage, 

ventriculoperitoneal shunt, COPD, hepatitis C, who presented after a fall at 

home. He lives by himself. His neighbors found him down on the floor in the 

kitchen. He reports that he lost his balance. He is a poor historian. He denies 

pain. He denies shortness of breath. He denies abdominal pain, nausea, and vomit

ing. He is awake and alert. He is oriented to person, place, month, and year. He

is disoriented to city and thinks he is in [place]. We discussed his goals

of care and he says he does not want CPR or a breathing machine. He wants to be 

DNR/DNI. He says he wants to go home and smoke cigarettes and drink whiskey. We 

attempted to call his DPOA, Steven, but he did not answer.


Subjective/Events-last exam


Pt reports doing well. No complaints. Asks if he can bum a spoke off of me.





Focused Exam


Lactate Level


11/28/22 07:06: Lactic Acid Level 1.37








Objective


Exam


Vital Signs





Vital Signs








  Date Time  Temp Pulse Resp B/P (MAP) Pulse Ox O2 Delivery O2 Flow Rate FiO2


 


11/30/22 12:00 37.2 104 20 131/87 (102) 97 Nasal Cannula 2.00 


 


11/30/22 09:55        28





Capillary Refill : Less Than 3 Seconds


General Appearance:  No Apparent Distress


Respiratory:  Lungs Clear, No Respiratory Distress


Cardiovascular:  Regular Rate, Rhythm, No Murmur


Neurologic/Psychiatric:  Alert, Oriented x3, Normal Mood/Affect





Results/Procedures


Lab


Laboratory Tests


11/30/22 06:31








Patient resulted labs reviewed.


Imaging:  Reviewed Imaging Films, Reviewed Imaging Report





Assessment/Plan


Assessment and Plan


Assess & Plan/Chief Complaint


Dehydration


Fall from ground level


Rhabdomyolysis- resolved


Lactic acidosis


   IV fluids- decrease rate


   PT/OT


   Social work consulted- possible SNF placement





Malnutrition


Hypoalbuminemia


Dysphagia


   ST consult- Minced and moist ordered


   Monitor electrolytes





Hypoglycemia


High anion gap metabolic acidosis


   Not diabetic


   D5 1/2 NS w/20KCl- decrease rate


   Possibly due to prolonged starvation





SIRS


   leukocytosis resolved


   CXR, UA, blood cultures negative


   No infectious source identified


   Antibiotics deferred





Alcohol abuse


Tobacco abuse


   Nicotine patch


   Monitor for withdrawal symptoms


   Continue multivitamin, folate, thiamine





Macrocytic anemia


   Mild anemia


   Possibly due to alcohol use


   Normal to high folic acid and B12 levels





History of subarachnoid hemorrhage


Ventriculoperitoneal shunt


   Clinically significant, no acute management needs





DVT prophylaxis: RONI Hernandez MD             Nov 30, 2022 1:35 pm

## 2022-11-30 NOTE — OCC THERAPY PROGRESS NOTE
Therapy Progress Note


OT tx attempted, pt adamantly refused OT tx on this date. OT educated pt on 

purpose and benefit of OT, he verbalized understanding, but continued to refuse 

tx, stating "I'm exhausted" and requesting a day to rest. OT will attempt tx 

again tomorrow per pt request.





1, refusal


[id number]











ROMAN WOODRUFF OT          Nov 30, 2022 10:37

## 2022-11-30 NOTE — DISCHARGE INST-SKILLED NURSING
Discharge Inst-Skilled NF


Chief Complaint


Catrachito Angel is a 72 year old male with PMH subarachnoid hemorrhage, 

ventriculoperitoneal shunt, COPD, hepatitis C, who presented after a fall at 

home. He lives by himself. His neighbors found him down on the floor in the 

kitchen. He reports that he lost his balance. He is a poor historian. He denies 

pain. He denies shortness of breath. He denies abdominal pain, nausea, and 

vomiting. He is awake and alert. He is oriented to person, place, month, and 

year. He is disoriented to city and thinks he is in [place]. We discussed 

his goals of care and he says he does not want CPR or a breathing machine. He 

wants to be DNR/DNI. He says he wants to go home and smoke cigarettes and drink 

whiskey. We attempted to call his DPOA, Steven, but he did not answer.





Consult/Follow Up/Orders


Skilled NF Admit to:  [facility]


Certification (SNF)


I certify that SNF services are required to be given on an inpatient basis 

because of the above named patient's need for skilled nursing care on a 

continuing basis for the conditions(s) for which he/she was receiving inpatient 

hospital services prior to his/her transfer to the SNF.


Skilled Nursing Facility Order:  Nursing Services, Occupational Ther-Evaluate & 

Treat, Physical Therapy-Evaluate & Treat, Speech Language-Evaluate & Treat


Oxygen Delivery Method:  Nasal Cannula


Discharge Diet:  Soft Diet


Resuscitation Status:  Do Not Resuscitate





New & Resume Previous Orders


Roni Trujillo 


Nov 30, 2022 


18:33











RONI TRUJILLO MD              Nov 30, 2022 18:35

## 2022-11-30 NOTE — PHYSICAL THERAPY DAILY NOTE
PT Daily Note-Current


Subjective


Pt. on BSC upon entering room, having BM. Pt. initially declines Rx but when 

encouraged that PT can help him get stronger and get out of here pt. agreed. Pt.

declined attempting to clean himself after BM , "you do it , I dont think I can"

Pt. appeared to have OCD tendencies requesting all wrinkles be smoothed absolut

ely perfectly out of his bed which was freshly made and several other 

insignificant items in room be moved exactly. "Thank you for your patience"





Pain





   Numeric Pain Scale:  4


   Location:  Right


   Location Body Site:  Thigh (and left thigh and leg)


   Pain Description:  Ache


Section J - Health Conditions


1. Rarely or not at all


2. Occasionally


3. Frequently


4. Almost constantly


8. Unable to answer


Pain Effect on Sleep:  3


Pain Interference with Therapy:  3


Pain Interference w/Day-to-Day:  3





Appearance


pt. removes his O2 frequently but is visibly SOB. O2 reinstated x 2 at 2 L with 

SOA contd,





Mental Status


Patient Orientation:  Normal For Age


Attachments:  Oxygen (2L), IV





Transfers


SCALE: Activities may be completed with or without assistive devices.





6-Indepedent-patient completes the activity by him/herself with no assistance 

from a helper.


5-Set-up or Clean-up Assistance-helper sets up or cleans up; patient completes 

activity. [place] assists only prior to or  


    following the activity.


4-Supervision or Touching Assistance-helper provides verbal cues and/or 

touching/steadying and/or contact guard assistance as patient completes 

activity. Assistance may be provided   


    throughout the activity or intermittently.


3-Partial/Moderate Assistance-helper does LESS THAN HALF the effort. [place] 

lifts, holds or supports trunk or limbs, but provides less than half the effort.


2-Substantial/Maximal Assistance-helper does MORE THAN HALF the effort. [place] 

lifts or holds trunk or limbs and provides more than half the effort.


8-Gqbjwfgkz-kqxxae does ALL the effort. Patient does none of the effort to 

complete the activity. Or, the assistance of 2 or more helpers is required for 

the patient to complete the  


    activity.


If activity was not attempted, code reason:


7-Patient Refused.


9-Not Applicable-not attempted and the patient did not perform the activity 

before the current illness, exacerbation or injury.


10-Not Attempted due to Environmental Limitations-(lack of equipment, weather 

restraints, etc.).


88-Not Attempted due to Medical Conditions or Safety Concerns.


Sit to Stand (QC):  4


Chair/Bed-to-Chair Xfer(QC):  4


Toilet Transfer (QC):  4





Weight Bearing


Right Lower Extremity:  Right


Weight Bearing/Tolerated


Left Lower Extremity:  Left


Weight Bearing/Tolerated





Gait Training


Does the Patient Walk?:  Yes


Walk 10 feet (QC):  4


Gait Persons Needed:  1


Gait Assistive Device:  FWW


pt. is very slow, takes several standing rest breaks, wide TARA, needs mod assist

to guide and move FWW and instruction to stay in safe position in FWW. ambulated

25 ft inside room, BSC to recliner, resistive





Treatments


toileting, sit to stands, gait, funct mob





Assessment


Current Status:  Fair Progress





PT Long Term Goals


Long Term Goals


PT Long Term Goals Time Frame:  Dec 10, 2022


Roll Left & Right (QC):  6


Sit to Lying (QC):  6


Lying-Sitting on Side/Bed(QC):  6


Sit to Stand (QC):  6


Chair/Bed-to-Chair Xfer(QC):  6


Toilet Transfer (QC):  6


Walk 10 feet (QC):  4


Walk 50ft with 2 Turns (QC):  4


Walk 150 ft (QC):  4





PT Plan


Treatment/Plan


Treatment Plan:  Continue Plan of Care


Treatment Plan:  Bed Mobility, Education, Functional Activity Judith, Functional 

Strength, Gait, Safety, Therapeutic Exercise, Transfers


Treatment Duration:  Dec 10, 2022


Frequency:  6 times per week


Estimated Hrs Per Day:  .25 hour per day





Safety Risks/Education


Patient Education:  Gait Training, Transfer Techniques, Reviewed Precautions, 

Correct Positioning, Disease Process, Safety Issues


Teaching Recipient:  Patient


Teaching Methods:  Demonstration, Discussion


Response to Teaching:  Verbalize Understanding, Return Demonstration, 

Reinforcement Needed





Time


Time In:  900


Time Out:  930


DATE:  Nov 30, 2022


Total Billed Treatment Time:  30


Total Billed Treatment


1,FA15m,GT15m











RODOLFO MOONEY PTA             Nov 30, 2022 09:35

## 2022-11-30 NOTE — DIAGNOSTIC IMAGING REPORT
EXAMINATION: CT head without contrast.



TECHNIQUE: Multiple contiguous axial images were obtained through

the brain without the use of intravenous contrast. All CT scans

use one or more of the following dose optimizing techniques:

automated exposure control, MA and/or KvP adjustment based on

patient size and exam type or iterative reconstruction. 



HISTORY: Seizure-like activity.



COMPARISON: 11/26/2022.



FINDINGS: There has been interval development of a left convexity

subdural hematoma measuring approximately 0.4-0.5 cm in

thickness. This appears superimposed on chronic subdural

thickening along the left convexity. No midline shift. No large

acute territorial ischemia. Stable area of encephalomalacia in

the lateral right frontal lobe. Stable aneurysm coiling at the

basilar tip. Stable right parietal approach  shunt. The

ventricles, cortical sulci and basilar cisterns are patent and

unremarkable. 



The orbits are normal. Paranasal sinuses are normal. Mastoid air

cells are clear. No soft tissue abnormality is seen. No osseus

lesion or fracture is seen.



IMPRESSION:  

1. Interval development of acute on chronic left convexity

subdural hematoma measuring 0.4-0.5 cm in thickness. No

associated mass effect. This may represent over shunting and

correlation with the right parietal approach  shunt settings is

recommended.

2. No large acute territorial ischemia. No hydrocephalus.



Findings were called to the patient's nurse at 10:04 PM on

11/30/2022 by Dr. Elvis Perez.



Dictated by: 



  Dictated on workstation # [id number]

## 2022-12-01 VITALS — SYSTOLIC BLOOD PRESSURE: 116 MMHG | DIASTOLIC BLOOD PRESSURE: 54 MMHG

## 2022-12-01 VITALS — DIASTOLIC BLOOD PRESSURE: 70 MMHG | SYSTOLIC BLOOD PRESSURE: 90 MMHG

## 2022-12-01 VITALS — DIASTOLIC BLOOD PRESSURE: 58 MMHG | SYSTOLIC BLOOD PRESSURE: 130 MMHG

## 2022-12-01 VITALS — DIASTOLIC BLOOD PRESSURE: 59 MMHG | SYSTOLIC BLOOD PRESSURE: 106 MMHG

## 2022-12-01 VITALS — DIASTOLIC BLOOD PRESSURE: 80 MMHG | SYSTOLIC BLOOD PRESSURE: 129 MMHG

## 2022-12-01 LAB
BASOPHILS # BLD AUTO: 0 10^3/UL (ref 0–0.1)
BASOPHILS NFR BLD AUTO: 1 % (ref 0–10)
BUN/CREAT SERPL: 14
CALCIUM SERPL-MCNC: 7.9 MG/DL (ref 8.5–10.1)
CHLORIDE SERPL-SCNC: 94 MMOL/L (ref 98–107)
CO2 SERPL-SCNC: 26 MMOL/L (ref 21–32)
CREAT SERPL-MCNC: 0.64 MG/DL (ref 0.6–1.3)
EOSINOPHIL # BLD AUTO: 0.1 10^3/UL (ref 0–0.3)
EOSINOPHIL NFR BLD AUTO: 1 % (ref 0–10)
GFR SERPLBLD BASED ON 1.73 SQ M-ARVRAT: 99 ML/MIN
GLUCOSE SERPL-MCNC: 81 MG/DL (ref 70–105)
HCT VFR BLD CALC: 26 % (ref 40–54)
HGB BLD-MCNC: 8.5 G/DL (ref 13.3–17.7)
LYMPHOCYTES # BLD AUTO: 0.3 10^3/UL (ref 1–4)
LYMPHOCYTES NFR BLD AUTO: 5 % (ref 12–44)
MAGNESIUM SERPL-MCNC: 1.6 MG/DL (ref 1.6–2.4)
MANUAL DIFFERENTIAL PERFORMED BLD QL: NO
MCH RBC QN AUTO: 35 PG (ref 25–34)
MCHC RBC AUTO-ENTMCNC: 33 G/DL (ref 32–36)
MCV RBC AUTO: 107 FL (ref 80–99)
MONOCYTES # BLD AUTO: 0.4 10^3/UL (ref 0–1)
MONOCYTES NFR BLD AUTO: 7 % (ref 0–12)
NEUTROPHILS # BLD AUTO: 5.1 10^3/UL (ref 1.8–7.8)
NEUTROPHILS NFR BLD AUTO: 86 % (ref 42–75)
PHOSPHATE SERPL-MCNC: 2.9 MG/DL (ref 2.3–4.7)
PLATELET # BLD: 191 10^3/UL (ref 130–400)
PMV BLD AUTO: 11 FL (ref 9–12.2)
POTASSIUM SERPL-SCNC: 4.7 MMOL/L (ref 3.6–5)
SODIUM SERPL-SCNC: 128 MMOL/L (ref 135–145)
WBC # BLD AUTO: 5.9 10^3/UL (ref 4.3–11)

## 2022-12-01 RX ADMIN — Medication SCH MG: at 05:15

## 2022-12-01 RX ADMIN — IPRATROPIUM BROMIDE AND ALBUTEROL SULFATE SCH ML: .5; 3 SOLUTION RESPIRATORY (INHALATION) at 08:03

## 2022-12-01 RX ADMIN — MAGNESIUM SULFATE IN DEXTROSE SCH MLS/HR: 10 INJECTION, SOLUTION INTRAVENOUS at 06:43

## 2022-12-01 RX ADMIN — IPRATROPIUM BROMIDE AND ALBUTEROL SULFATE SCH ML: .5; 3 SOLUTION RESPIRATORY (INHALATION) at 22:10

## 2022-12-01 RX ADMIN — DOCUSATE SODIUM SCH MG: 100 CAPSULE ORAL at 20:11

## 2022-12-01 RX ADMIN — DEXTROSE MONOHYDRATE, SODIUM CHLORIDE, AND POTASSIUM CHLORIDE SCH MLS/HR: 50; 4.5; 1.49 INJECTION, SOLUTION INTRAVENOUS at 15:43

## 2022-12-01 RX ADMIN — MAGNESIUM SULFATE IN DEXTROSE SCH MLS/HR: 10 INJECTION, SOLUTION INTRAVENOUS at 06:00

## 2022-12-01 RX ADMIN — DOCUSATE SODIUM SCH MG: 100 CAPSULE ORAL at 08:22

## 2022-12-01 RX ADMIN — ASCORBIC ACID, VITAMIN A PALMITATE, CHOLECALCIFEROL, THIAMINE HYDROCHLORIDE, RIBOFLAVIN-5 PHOSPHATE SODIUM, PYRIDOXINE HYDROCHLORIDE, NIACINAMIDE, DEXPANTHENOL, ALPHA-TOCOPHEROL ACETATE, VITAMIN K1, FOLIC ACID, BIOTIN, CYANOCOBALAMIN SCH MLS/HR: 200; 3300; 200; 6; 3.6; 6; 40; 15; 10; 150; 600; 60; 5 INJECTION, SOLUTION INTRAVENOUS at 08:51

## 2022-12-01 RX ADMIN — FOLIC ACID SCH MG: 1 TABLET ORAL at 08:22

## 2022-12-01 RX ADMIN — POTASSIUM CHLORIDE SCH MEQ: 1500 TABLET, EXTENDED RELEASE ORAL at 06:00

## 2022-12-01 RX ADMIN — POTASSIUM CHLORIDE SCH MLS/HR: 200 INJECTION, SOLUTION INTRAVENOUS at 06:00

## 2022-12-01 RX ADMIN — NICOTINE SCH MG: 14 PATCH, EXTENDED RELEASE TRANSDERMAL at 08:57

## 2022-12-01 RX ADMIN — ASCORBIC ACID, VITAMIN A PALMITATE, CHOLECALCIFEROL, THIAMINE HYDROCHLORIDE, RIBOFLAVIN-5 PHOSPHATE SODIUM, PYRIDOXINE HYDROCHLORIDE, NIACINAMIDE, DEXPANTHENOL, ALPHA-TOCOPHEROL ACETATE, VITAMIN K1, FOLIC ACID, BIOTIN, CYANOCOBALAMIN SCH MG: 200; 3300; 200; 6; 3.6; 6; 40; 15; 10; 150; 600; 60; 5 INJECTION, SOLUTION INTRAVENOUS at 08:52

## 2022-12-01 RX ADMIN — Medication SCH EA: at 05:15

## 2022-12-01 RX ADMIN — MAGNESIUM SULFATE IN DEXTROSE SCH MLS/HR: 10 INJECTION, SOLUTION INTRAVENOUS at 07:56

## 2022-12-01 RX ADMIN — ASCORBIC ACID, VITAMIN A PALMITATE, CHOLECALCIFEROL, THIAMINE HYDROCHLORIDE, RIBOFLAVIN-5 PHOSPHATE SODIUM, PYRIDOXINE HYDROCHLORIDE, NIACINAMIDE, DEXPANTHENOL, ALPHA-TOCOPHEROL ACETATE, VITAMIN K1, FOLIC ACID, BIOTIN, CYANOCOBALAMIN SCH MG: 200; 3300; 200; 6; 3.6; 6; 40; 15; 10; 150; 600; 60; 5 INJECTION, SOLUTION INTRAVENOUS at 20:11

## 2022-12-01 NOTE — OCC THERAPY PROGRESS NOTE
Therapy Progress Note


OT tx attempted this AM, pt laying in bed, eyes barely open, lethargic. Pt 

grunting, but no verbal response to this therapist, and unable to follow 

instructions. Pt unable to actively participate in skilled OT services at this 

time. OT will attempt tx again at next available time.





1, visit


[id number]











ROMAN WOODRUFF OT           Dec 1, 2022 10:04

## 2022-12-01 NOTE — PROGRESS NOTE - HOSPITALIST
Subjective


HPI/CC On Admission


Date Seen by Provider:  Dec 1, 2022


Catrachito Angel is a 72 year old male with PMH subarachnoid hemorrhage, 

ventriculoperitoneal shunt, COPD, hepatitis C, who presented after a fall at 

home. He lives by himself. His neighbors found him down on the floor in the 

kitchen. He reports that he lost his balance. He is a poor historian. He denies 

pain. He denies shortness of breath. He denies abdominal pain, nausea, and 

vomiting. He is awake and alert. He is oriented to person, place, month, and 

year. He is disoriented to city and thinks he is in [place]. We discussed 

his goals of care and he says he does not want CPR or a breathing machine. He 

wants to be DNR/DNI. He says he wants to go home and smoke cigarettes and drink 

whiskey. We attempted to call his DPOA, Steven, but he did not answer.


Subjective/Events-last exam


Reviewed events from last night. Saw patient around 0730 this AM. Arousable but 

certainly not as alert as yesterday. Called and spoke with Steven (DPOA) and his 

wife and updated him on events from last night and CT findings of SDH. They 

report that Jadon would not want any further neurosurgical interventions and 

would just like to be kept comfortable. Discussed plan to repeat CT head in AM 

and assess for progression. If stable can DC to NH on hospice but if he 

progresses further consider comfort care in the hospital.





Objective


Exam


Vital Signs





Vital Signs








  Date Time  Temp Pulse Resp B/P (MAP) Pulse Ox O2 Delivery O2 Flow Rate FiO2


 


12/1/22 08:04     91 Nasal Cannula 2.00 


 


12/1/22 08:00 37.4 114 26 106/59 (75)    


 


11/30/22 09:55        28





Capillary Refill : Less Than 3 Seconds


General Appearance:  No Apparent Distress, Chronically ill


Respiratory:  Lungs Clear, No Respiratory Distress


Cardiovascular:  No Murmur, Tachycardia


Neurologic/Psychiatric:  Alert





Results/Procedures


Lab


Laboratory Tests


12/1/22 05:53








Patient resulted labs reviewed.


Imaging:  Reviewed Imaging Films, Reviewed Imaging Report





Assessment/Plan


Assessment and Plan


Assess & Plan/Chief Complaint


Acute on chronic SDH


Seizure


   Hold blood thinners


   Discussed with DPOA- no surgical intervention desired- defer transfer


   Repeat CT Head in AM


   If stable DC to NH with hospice tomorrow


   Ativan prn





Dehydration


Fall from ground level


Rhabdomyolysis- resolved


Lactic acidosis


   PT/OT as able





Malnutrition


Hypoalbuminemia


Dysphagia


   ST consult- Minced and moist ordered


   Monitor electrolytes





Hypoglycemia


High anion gap metabolic acidosis


   Not diabetic


   D5 1/2 NS w/20KCl


   Possibly due to prolonged starvation





Alcohol abuse


Tobacco abuse


   Nicotine patch


   Monitor for withdrawal symptoms


   Continue multivitamin, folate, thiamine





Macrocytic anemia


   Mild anemia


   Possibly due to alcohol use


   Normal to high folic acid and B12 levels





History of subarachnoid hemorrhage


Ventriculoperitoneal shunt


   Clinically significant, no acute management needs





DVT prophylaxis: RONI Hernandez MD               Dec 1, 2022 12:31

## 2022-12-01 NOTE — PHYSICAL THERAPY PROGRESS NOTE
Therapy Progress Note


Patient on Hold per RN due to decline in medical status.  PT will continue to 

monitor patient status.











REID DYSON PT               Dec 1, 2022 10:33

## 2022-12-02 VITALS — DIASTOLIC BLOOD PRESSURE: 56 MMHG | SYSTOLIC BLOOD PRESSURE: 132 MMHG

## 2022-12-02 VITALS — SYSTOLIC BLOOD PRESSURE: 144 MMHG | DIASTOLIC BLOOD PRESSURE: 67 MMHG

## 2022-12-02 VITALS — SYSTOLIC BLOOD PRESSURE: 155 MMHG | DIASTOLIC BLOOD PRESSURE: 68 MMHG

## 2022-12-02 VITALS — SYSTOLIC BLOOD PRESSURE: 112 MMHG | DIASTOLIC BLOOD PRESSURE: 63 MMHG

## 2022-12-02 LAB
BASOPHILS # BLD AUTO: 0 10^3/UL (ref 0–0.1)
BASOPHILS NFR BLD AUTO: 1 % (ref 0–10)
BUN/CREAT SERPL: 14
CALCIUM SERPL-MCNC: 7.7 MG/DL (ref 8.5–10.1)
CHLORIDE SERPL-SCNC: 94 MMOL/L (ref 98–107)
CO2 SERPL-SCNC: 29 MMOL/L (ref 21–32)
CREAT SERPL-MCNC: 0.63 MG/DL (ref 0.6–1.3)
EOSINOPHIL # BLD AUTO: 0.1 10^3/UL (ref 0–0.3)
EOSINOPHIL NFR BLD AUTO: 1 % (ref 0–10)
GFR SERPLBLD BASED ON 1.73 SQ M-ARVRAT: 100 ML/MIN
GLUCOSE SERPL-MCNC: 79 MG/DL (ref 70–105)
HCT VFR BLD CALC: 27 % (ref 40–54)
HGB BLD-MCNC: 8.8 G/DL (ref 13.3–17.7)
LYMPHOCYTES # BLD AUTO: 0.4 10^3/UL (ref 1–4)
LYMPHOCYTES NFR BLD AUTO: 4 % (ref 12–44)
MAGNESIUM SERPL-MCNC: 2 MG/DL (ref 1.6–2.4)
MANUAL DIFFERENTIAL PERFORMED BLD QL: NO
MCH RBC QN AUTO: 35 PG (ref 25–34)
MCHC RBC AUTO-ENTMCNC: 33 G/DL (ref 32–36)
MCV RBC AUTO: 107 FL (ref 80–99)
MONOCYTES # BLD AUTO: 0.6 10^3/UL (ref 0–1)
MONOCYTES NFR BLD AUTO: 7 % (ref 0–12)
NEUTROPHILS # BLD AUTO: 7.6 10^3/UL (ref 1.8–7.8)
NEUTROPHILS NFR BLD AUTO: 87 % (ref 42–75)
PHOSPHATE SERPL-MCNC: 2.5 MG/DL (ref 2.3–4.7)
PLATELET # BLD: 195 10^3/UL (ref 130–400)
PMV BLD AUTO: 11.2 FL (ref 9–12.2)
POTASSIUM SERPL-SCNC: 4.5 MMOL/L (ref 3.6–5)
SODIUM SERPL-SCNC: 130 MMOL/L (ref 135–145)
WBC # BLD AUTO: 8.7 10^3/UL (ref 4.3–11)

## 2022-12-02 RX ADMIN — POTASSIUM CHLORIDE SCH MEQ: 1500 TABLET, EXTENDED RELEASE ORAL at 07:40

## 2022-12-02 RX ADMIN — MAGNESIUM SULFATE IN DEXTROSE SCH MLS/HR: 10 INJECTION, SOLUTION INTRAVENOUS at 07:40

## 2022-12-02 RX ADMIN — Medication SCH EA: at 06:14

## 2022-12-02 RX ADMIN — NICOTINE SCH MG: 14 PATCH, EXTENDED RELEASE TRANSDERMAL at 08:34

## 2022-12-02 RX ADMIN — POTASSIUM CHLORIDE SCH MLS/HR: 200 INJECTION, SOLUTION INTRAVENOUS at 07:39

## 2022-12-02 RX ADMIN — IPRATROPIUM BROMIDE AND ALBUTEROL SULFATE SCH ML: .5; 3 SOLUTION RESPIRATORY (INHALATION) at 08:19

## 2022-12-02 RX ADMIN — ASCORBIC ACID, VITAMIN A PALMITATE, CHOLECALCIFEROL, THIAMINE HYDROCHLORIDE, RIBOFLAVIN-5 PHOSPHATE SODIUM, PYRIDOXINE HYDROCHLORIDE, NIACINAMIDE, DEXPANTHENOL, ALPHA-TOCOPHEROL ACETATE, VITAMIN K1, FOLIC ACID, BIOTIN, CYANOCOBALAMIN SCH MLS/HR: 200; 3300; 200; 6; 3.6; 6; 40; 15; 10; 150; 600; 60; 5 INJECTION, SOLUTION INTRAVENOUS at 08:34

## 2022-12-02 RX ADMIN — FOLIC ACID SCH MG: 1 TABLET ORAL at 08:38

## 2022-12-02 RX ADMIN — DOCUSATE SODIUM SCH MG: 100 CAPSULE ORAL at 08:34

## 2022-12-02 RX ADMIN — Medication SCH MG: at 06:14

## 2022-12-02 RX ADMIN — ASCORBIC ACID, VITAMIN A PALMITATE, CHOLECALCIFEROL, THIAMINE HYDROCHLORIDE, RIBOFLAVIN-5 PHOSPHATE SODIUM, PYRIDOXINE HYDROCHLORIDE, NIACINAMIDE, DEXPANTHENOL, ALPHA-TOCOPHEROL ACETATE, VITAMIN K1, FOLIC ACID, BIOTIN, CYANOCOBALAMIN SCH MG: 200; 3300; 200; 6; 3.6; 6; 40; 15; 10; 150; 600; 60; 5 INJECTION, SOLUTION INTRAVENOUS at 08:39

## 2022-12-02 NOTE — OCCUPATIONAL THER DAILY NOTE
OT Current Status-Daily Note


Subjective


Pt in bed, agreeable to OT Tx.





ADL-Treatment


Therapy Code Descriptions/Definitions 





Functional [place] Measure:


0=Not Assessed/NA        4=Minimal Assistance


1=Total Assistance        5=Supervision or Setup


2=Maximal Assistance  6=Modified [place]


3=Moderate Assistance 7=Complete IndependenceSCALE: Activities may be completed 

with or without assistive devices.





6-Indepedent-patient completes the activity by him/herself with no assistance 

from a helper.


5-Set-up or Clean-up Assistance-helper sets up or cleans up; patient completes 

activity. [place] assists only prior to or  


    following the activity.


4-Supervision or Touching Assistance-helper provides verbal cues and/or 

touching/steadying and/or contact guard assistance as patient completes 

activity. Assistance may be provided   


    throughout the activity or intermittently.


3-Partial/Moderate Assistance-helper does LESS THAN HALF the effort. [place] 

lifts, holds or supports trunk or limbs, but provides less than half the effort.


2-Substantial/Maximal Assistance-helper does MORE THAN HALF the effort. [place] 

lifts or holds trunk or limbs and provides more than half the effort.


7-[place]om[place]ucio-qldiep does ALL the effort. Patient does none of the effort to 

complete the activity. Or, the assistance of 2 or more helpers is required for 

the patient to complete the  


    activity.


If activity was not attempted, code reason:


7-Patient Refused.


9-Not Applicable-not attempted and the patient did not perform the activity befo

re the current illness, exacerbation or injury.


10-Not Attempted due to Environmental Limitations-(lack of equipment, weather re

straints, etc.).


88-Not Attempted due to Medical Conditions or Safety Concerns.





Other Treatment


Pt in recliner, in order to increase BUE Strength and activity tolerance, pt 

completed x10 reps each of the following BUE AAROM: shoulder flexion, front 

punch, elbow flexion/extension. OT assisted pt with applying lotion to chest and

back due to noted dry skin. Post tx, pt in bed, call light in reach and all 

needs met, telesitter present.





Education


OT Patient Education:  Correct positioning, Energy conservation, Exercise 

program, Modified ADL techniques, Progress toward Goal/Update tx plan, Purpose 

of tx/functional activities, Rehab process


Teaching Recipient:  Patient


Teaching Methods:  Demonstration


Response to Teaching:  Return Demonstration





OT Long Term Goals


Long Term Goals


Time Frame:  Dec 9, 2022


Eating (QC):  6


Oral Hygiene (QC):  6


Toileting Hygiene (QC):  6


Shower/Bathe Self (QC):  4


Upper Body Dressing (QC):  5


Lower Body Dressing (QC):  4


On/Off Footwear (QC):  4


Additional Goals:  1-Demonstrate ADL Tasks, 2-Verbalize Understanding, 

3-ImproveStrength/Judith


1=Demonstrate adherence to instructed precautions during ADL tasks.


2=Patient will verbalize/demonstrate understanding of assistive 

devices/modifications for ADL.


3=Patient will improve strength/tolerance for activity to enable patient to 

perform ADL's.





OT Education/Plan


Problem List/Assessment


Assessment:  Decreased Activ Tolerance, Decreased Safety Aware, Decreased UE 

Strength, Impaired Cognition, Impaired Funct Balance, Impaired I ADL's, Impaired

Self-Care Skills





Discharge Recommendations


Plan/Recommendations:  Continue POC





Treatment Plan/Plan of Care


Patient would benefit from OT for education, treatment and training to promote 

independence in ADL's, mobility, safety and/or upper extremity function for 

ADL's.


Plan of Care:  ADL Retraining, Functional Mobility, UE Funct Exercise/Act


Treatment Duration:  Dec 9, 2022


Frequency:  3 times per week (3-5 times per week)


Estimated Hrs Per Day:  .25 hour per day


Rehab Potential:  Fair





Time


Start Time:  10:51


Stop Time:  11:00


DATE:  Dec 2, 2022


Total Time Billed (hr/min):  9


Billed Treatment Time


1, EX











ROMAN WOODRUFF OT           Dec 2, 2022 11:14

## 2022-12-02 NOTE — DIAGNOSTIC IMAGING REPORT
PROCEDURE: CT head without contrast.



TECHNIQUE: Multiple contiguous axial images were obtained through

the brain without the use of intravenous contrast. Auto Exposure

Controls were utilized during the CT exam to meet ALARA standards

for radiation dose reduction. 



INDICATION:  Follow-up subdural hematoma.



Correlation is made prior head CT 11/30/2022.



Right-sided  shunt remains in place. Overall ventricular size

is stable. The shallow mixed density subdural along the left

cerebral convexity appears stable when compared with prior exam.

No new area of hemorrhage is detected. There is no midline shift.

Cisterns are patent. 3 aneurysm coils from prior a basilar tip

aneurysm coiling are again noted.



IMPRESSION: Stable noncontrast head CT with stable mixed density

subdural fluid collection along the left cerebral convexity when

compared with prior exam from 11/30/2022. No new abnormality is

detected.



Dictated by: 



  Dictated on workstation # [id number]

## 2022-12-02 NOTE — DISCHARGE SUMMARY
Diagnosis/Chief Complaint


Date of Admission


Nov 27, 2022 at 16:39


Date of Discharge





Discharge Date:  Dec 2, 2022


Admission Diagnosis


Rhabdomyolysis


Primary Care


Henrik Allison MD


Discharge Diagnosis





(1) Rhabdomyolysis


Status:  Acute


(2) Fall from ground level


Status:  Acute


(3) Debility


Status:  Acute


(4) Lactic acidosis


Status:  Acute


(5) SIRS (systemic inflammatory response syndrome)


Status:  Acute


(6) Malnutrition


Status:  Acute


(7) Hypoalbuminemia


Status:  Acute


(8) Dysphagia


Status:  Acute


(9) Alcohol abuse


Status:  Chronic


(10) Tobacco abuse


Status:  Chronic


(11) Macrocytic anemia


Status:  Acute


(12) History of subarachnoid hemorrhage


Status:  Chronic


(13) Status post ventriculoperitoneal shunt


Status:  Chronic


(14) Dehydration


Status:  Acute


(15) Pressure sore


Status:  Acute


(16) COPD (chronic obstructive pulmonary disease)


Status:  Chronic


(17) FTT (failure to thrive) in adult


Status:  Acute


(18) Non-diabetic hypoglycemia


Status:  Acute


(19) High anion gap metabolic acidosis


Status:  Acute





Discharge Summary


Discharge Physical Exam


Allergies:  


Coded Allergies:  


     No Known Drug Allergies (Unverified , 11/17/21)


Vitals & I&Os





Vital Signs








  Date Time  Temp Pulse Resp B/P (MAP) Pulse Ox O2 Delivery O2 Flow Rate FiO2


 


12/2/22 11:06 37.1 107 19 132/56 (81)  Nasal Cannula 2.00 


 


12/2/22 08:48     93   


 


11/30/22 09:55        28








General Appearance:  Chronically ill, Thin


Respiratory:  Lungs Clear, No Respiratory Distress


Cardiovascular:  Regular Rate, Rhythm, No Murmur


Neurologic/Psychiatric:  Alert, Oriented x3





Hospital Course


Patient was admitted to the hospital secondary to rhabdomyolysis due to a fall 

with acute kidney injury.  He was treated with IV fluids and did well.  He was 

seen by physical therapy and was to discharge to a nursing home but the night 

before planned discharge he had a seizure and CT head was done which showed 

acute on chronic subdural hematoma.  Despite these findings he was clinically 

stable and returned to his baseline mentation.  A repeat CT head 24 hours later 

was done and was stable.  I discussed with patient's DPOA regarding goals of 

care and whether or not he would want aggressive surgical intervention for this 

and the DPOA stated that the patient would not.  They elected to not transfer or

pursue neurosurgical evaluation and to continue plan to discharge to skilled 

nursing.  We did discuss potential hospice but that will be addressed on an 

outpatient status.


Labs (last 24 hrs)


Laboratory Tests


12/2/22 12:40: Glucometer 109





Microbiology


11/26/22 Blood Culture - Final, Complete


           No growth


Patient resulted labs reviewed.


Pending Labs





Imaging:  Reviewed Imaging Films, Reviewed Imaging Report





Discussion & Recommendations


Discharge Planning:  >30 minutes discharge planning





Discharge


Home Medications:





Active Scripts


Active


Tab-A-Flora Multivit with Iron (Multivitamin/Iron/Folic Acid) 18 Mg Iron-400 Mcg 

Tablet 1 Ea PO DAILY@0700





Instructions to patient/family


Please see electronic discharge instructions given to patient.





Problem Qualifiers





(1) Rhabdomyolysis:  


Rhabdomyolysis type:  non-traumatic  Qualified Codes:  M62.82 - Rhabdomyolysis


(2) Malnutrition:  


Malnutrition type:  protein-calorie malnutrition








RONI CASAS MD               Dec 2, 2022 13:04

## 2022-12-03 ENCOUNTER — HOSPITAL ENCOUNTER (EMERGENCY)
Dept: HOSPITAL 75 - ER | Age: 72
Discharge: HOME | End: 2022-12-03
Payer: MEDICARE

## 2022-12-03 VITALS — BODY MASS INDEX: 20.35 KG/M2 | HEIGHT: 68.11 IN | WEIGHT: 134.26 LBS

## 2022-12-03 VITALS — SYSTOLIC BLOOD PRESSURE: 132 MMHG | DIASTOLIC BLOOD PRESSURE: 66 MMHG

## 2022-12-03 DIAGNOSIS — F17.210: ICD-10-CM

## 2022-12-03 DIAGNOSIS — R53.83: Primary | ICD-10-CM

## 2022-12-03 DIAGNOSIS — J44.9: ICD-10-CM

## 2022-12-03 DIAGNOSIS — Z99.81: ICD-10-CM

## 2022-12-03 PROCEDURE — 99283 EMERGENCY DEPT VISIT LOW MDM: CPT

## 2022-12-03 NOTE — ED GENERAL
General


Chief Complaint:  General Problems/Pain


Stated Complaint:  LETHARGY


Nursing Triage Note:  


PT TO [facility] BY [facility] WITH WEAKNESS, LETHARGIC AND LOW O2. PT IS ON O2 3 LPM 


ALL THE TIME. 97% ON 3 LPM AT TRIAGE. PT IS IN [facility] 


[facility]. 35.3 TEMP, WARM BLANKETS APPLIED


Source of Information:  Patient


Exam Limitations:  No Limitations





History of Present Illness


Date Seen by Provider:  Dec 3, 2022


Time Seen by Provider:  10:32


Initial Comments


72-year-old male presents from [facility] for reported decreased breath 

sounds on the right side and what they reported as possible slurred speech this 

morning.  He was just discharged from our facility yesterday.  He had a subdural

hematoma and generalized deconditioning, alcohol abuse.  [facility] and discussion once he was medically optimized and whether or 

not they wanted to pursue hospice versus other care based on his progression.  

On arrival he has no specific complaints.  He is alert, oriented with no pain.  

He has no slurred speech.  He denies any weakness numbness or tingling anywhere.

he states he does not feel short of breath.





Allergies and Home Medications


Allergies


Coded Allergies:  


     No Known Drug Allergies (Unverified , 11/17/21)





Patient Home Medication List


Home Medication List Reviewed:  Yes


Multivitamin/Iron/Folic Acid (Tab-A-Flora Multivit with Iron) 18 Mg Iron-400 Mcg 

Tablet, 1 EA PO DAILY@0700


   Prescribed by: RONI CASAS on 12/2/22 0703





Review of Systems


Review of Systems


Constitutional:  no symptoms reported


EENTM:  no symptoms reported


Respiratory:  no symptoms reported


Cardiovascular:  no symptoms reported


Gastrointestinal:  no symptoms reported


Genitourinary:  no symptoms reported


Musculoskeletal:  no symptoms reported


Skin:  no symptoms reported


Psychiatric/Neurological:  No Symptoms Reported


Hematologic/Lymphatic:  No Symptoms Reported


Immunological/Allergic:  no symptoms reported





Past Medical-Social-Family Hx


Patient Social History


Tobacco Use?:  Yes


Tobacco type used:  Cigarettes


Use of E-Cig and/or Vaping dev:  No


Substance use?:  No


Alcohol Use?:  Yes


Alcohol type:  Hard Liquor





Past Medical History


Surgery/Hospitalization HX:  


Brain aneurysm treated at [facility],  shunt, COPD that is oxygen 


dependent,Hepatitis C


Brain Shunt


COPD


Hepatitis





Family Medical History


Reviewed Nursing Family Hx


No Pertinent Family Hx





Physical Exam


Vital Signs





Vital Signs - First Documented








 12/3/22





 10:24


 


Temp 35.3


 


Pulse 96


 


Resp 18


 


B/P (MAP) 135/62 (86)


 


Pulse Ox 97


 


O2 Delivery Nasal Cannula


 


O2 Flow Rate 3.00





Capillary Refill : Less Than 3 Seconds


Height, Weight, BMI


Height: '"


Weight: lbs. oz. kg; 20.00 BMI


Method:


General Appearance:  No Apparent Distress, Thin


HEENT:  PERRL/EOMI, TMs Normal, Normal ENT Inspection, Other


Neck:  Normal Inspection, Non Tender, Supple


Respiratory:  Chest Non Tender, Lungs Clear, No Accessory Muscle Use, No 

Respiratory Distress, Rhonci


Cardiovascular:  Regular Rate, Rhythm, No Edema, No Gallop, No JVD, No Murmur, 

Normal Peripheral Pulses


Gastrointestinal:  Normal Bowel Sounds, No Organomegaly, No Pulsatile Mass, Non 

Tender, Soft


Back:  Normal Inspection, No CVA Tenderness, No Vertebral Tenderness


Extremity:  Normal Capillary Refill, Normal Inspection, Normal Range of Motion, 

Non Tender, No Calf Tenderness, No Pedal Edema


Neurologic/Psychiatric:  Alert, Oriented x3, No Motor/Sensory Deficits, Normal M

ood/Affect, CNs II-XII Norm as Tested


Skin:  Normal Color, Warm/Dry





Progress/Results/Core Measures


Suspected Sepsis


SIRS


Temperature: 


Pulse: 96 


Respiratory Rate: 18


 


Blood Pressure 135 /62 


Mean: 86





Results/Orders


Vital Signs/I&O











 12/3/22 12/3/22





 10:24 10:31


 


Temp 35.3 


 


Pulse 96 


 


Resp 18 


 


B/P (MAP) 135/62 (86) 


 


Pulse Ox 97 


 


O2 Delivery Nasal Cannula Nasal Cannula


 


O2 Flow Rate 3.00 3.00





Capillary Refill : Less Than 3 Seconds








Blood Pressure Mean:                    86











Departure


Communication (Admissions)


I spoke with the patient and his family at about his care.  He has possibly had 

slurred speech earlier in the day that is not present on exam today.  Regardless

I think if he would have had slurred speech it was likely related to subdural 

hematoma which he and the family already decided not to pursue any further 

treatment outside of rehab.  He has equal breath sounds bilaterally though he 

does have significant rhonchi bilaterally his oxygen saturations are normal and 

he has no respiratory distress and no subjective concerns related to this.  He 

wishes no further interventions here in the emergency department has no concerns

at this time.  Given his family, DPOA who agrees I spoke with Dr. Brown who 

discharged the patient yesterday.  She actually came down to evaluate him and 

states that she believes he is in the same condition he was upon discharge 

yesterday.  He can be discharged back to medical [facility] is an otherwise stable 

condition.





Impression





   Primary Impression:  


   Fatigue


   Qualified Codes:  R53.83 - Other fatigue


Disposition:  01 HOME, SELF-CARE


Condition:  Stable





Departure-Patient Inst.


Referrals:  


SAM SANTIZO MD (PCP/Family)


Primary Care Physician


Patient Instructions:  Fatigue





Add. Discharge Instructions:  


We have spoken to TIBURCIO Harris.  He has no physical exam findings on arrival here 

and his vital signs are stable.  They wish no further interventions at this time

and discharged back to skilled nursing facility for rehab at least as best as he

can get in and then will decide further to go forward with hospice or long-term 

care facility.  





All discharge instructions reviewed with patient and/or family. Voiced 

understanding.











CRUZITO JAIMES DO             Dec 3, 2022 10:42

## 2022-12-04 ENCOUNTER — HOSPITAL ENCOUNTER (EMERGENCY)
Dept: HOSPITAL 75 - ER | Age: 72
End: 2022-12-04
Payer: MEDICARE

## 2022-12-04 VITALS — DIASTOLIC BLOOD PRESSURE: 64 MMHG | SYSTOLIC BLOOD PRESSURE: 136 MMHG

## 2022-12-04 DIAGNOSIS — Z99.81: ICD-10-CM

## 2022-12-04 DIAGNOSIS — R00.1: ICD-10-CM

## 2022-12-04 DIAGNOSIS — J96.90: Primary | ICD-10-CM

## 2022-12-04 DIAGNOSIS — J44.9: ICD-10-CM

## 2022-12-04 PROCEDURE — 99283 EMERGENCY DEPT VISIT LOW MDM: CPT

## 2022-12-04 NOTE — ED CPR
HPI-CPR


General


Chief Complaint:  Code Blue


Source of Information:  EMS


Exam Limitations:  Other (unresponsive)





History of Present Illness


Date Seen by Provider:  Dec 4, 2022


Time Seen by Provider:  06:30


Initial Comments


72-year-old male presents via EMS for decreased oxygen saturation.  He resides 

at [facility] and was seen here yesterday.  He was recently discharged for 

deconditioning after subdural hematoma.  He is an alcoholic and his family is 

aware that he is a very poor health and they have been considering hospice 

recently however they decided to send him to skilled nursing to "get him the 

best that they can" prior to making that decision.  EMS was called for decreased

oxygen saturation.  Upon talking to the DPOA yesterday I noted that there 

requests were that the patient would not want CPR or intubation.  On arrival CPR

is in progress and he has a I gel with bag mask ventilation in progress.  I 

spoke with Steven ([facility]) immediately upon the patient's arrival who confirms that 

they would wish only for comfort care.  He states "we knew this was coming."  He

specifically states the patient would not want CPR should it be required and he 

would not want to be on the ventilator.  I-gel was removed and oxy mask placed.





Allergies and Home Medications


Allergies


Coded Allergies:  


     No Known Drug Allergies (Unverified , 21)





Patient Home Medication List


Home Medication List Reviewed:  Yes


Multivitamin/Iron/Folic Acid (Tab-A-Flora Multivit with Iron) 18 Mg Iron-400 Mcg 

Tablet, 1 EA PO DAILY@0700


   Prescribed by: RONI CASAS on 22 0703





Review of Systems


Review of Systems


Constitutional:  no symptoms reported, other (Unable to obtain review of systems

due to unresponsiveness)





Past Medical-Social-Family Hx


Past Medical History


Surgery/Hospitalization HX:  


Brain aneurysm treated at [facility],  shunt, COPD that is oxygen 


dependent,Hepatitis C





All history via previous records as the patient is currently unresponsive


Brain Shunt


COPD


Hepatitis





Family Medical History


No Pertinent Family Hx





Physical Exam


Vital Signs





Vital Signs - First Documented








 22





 06:33


 


Pulse 69


 


Resp 14


 


B/P (MAP) 136/64 (88)


 


O2 Delivery Ambu Bag





Capillary Refill :


Height, Weight, BMI


Height: '"


Weight: lbs. oz. kg; 20.00 BMI


Method:


General Appearance:  Other (unresponsive)


HEENT:  Other (pupils dilated, sluggish)


Respiratory:  Decreased Breath Sounds, Rhonci, Other (bradypnea)


Cardiovascular:  Bradycardia


Gastrointestinal:  Normal Bowel Sounds, No Organomegaly, Non Tender, Soft


Extremity:  Slow Capillary Refill


Neurologic/Psychiatric:  Other (Unresponsive)


Skin:  Pallor





Progress/Results/Core Measures


Results/Orders


Vital Signs/I&O











 22





 06:33


 


Pulse 69


 


Resp 14


 


B/P (MAP) 136/64 (88)


 


O2 Delivery Ambu Bag











Departure


Communication (Admissions)


0645: On arrival CPR is in progress with bag mask ventilation. Spoke with TIBURCIO Harris immediately upon patient arrival who confirms patient and family wish only 

comfort measures. We stopped CPD and assisted ventilation and he had an 

organized bradycardic rhythm. I am unable to palpate a pulse but US confirms 

weak cardiac activity. He is breathing 7x/min unassisted and is hypoxic. Will 

proceed with comfort measures with expected death likely soon. 





0651:  bradycardia devolved to asystole. No spontaneous respiration, no pulse. 

TOD 0651





Impression





   Primary Impression:  


   Respiratory failure


   Qualified Codes:  J96.21 - Acute and chronic respiratory failure with 

   hypoxia; J96.22 - Acute and chronic respiratory failure with hypercapnia


   Additional Impression:  


   Bradycardia


Disposition:  20 


Condition:  





Departure-Patient Inst.


Referrals:  


SAM SANTIZO MD (PCP/Family)


Primary Care Physician











CRUZITO JAIMES DO             Dec 4, 2022 06:51

## 2025-03-11 NOTE — XMS REPORT
Encounter Summary

                             Created on: 2021



Catrachito Angel

External Reference #: [id number]

: 1950

Sex: Male



Demographics





                          Address                   [address] [place]  [address]

 

                          Home Phone                [phone]

 

                          Preferred Language        English

 

                          Marital Status            Single

 

                          Jehovah's witness Affiliation     Unknown

 

                          Race                      White

 

                          Ethnic Group              Not  or 





Author





                          Author                    [facility]

 

                          Organization              [facility]

 

                          Address                   Unknown

 

                          Phone                     Unavailable







Support





                Name            Relationship    Address         Phone

 

                Steven Yanez ECON            Unknown         [phone]







Care Team Providers





                    Care Team Member Name Role                Phone

 

                    Henrik Allison MD        PCP                 [phone]







Encounter Details





                          Care Team                 Description



                     Date                Type                Department  

 

                                        



Jabari, Interface Unk Provider              



                     2021          [facility]

e  



                                         Location  







Social History





                                        Date



                 Tobacco Use     Types           Packs/Day       Years Used 

 

                                         



                                         Never Assessed    







 



                           Sex Assigned at Birth     Date Recorded

 

 



                                         Not on file 



documented as of this encounter



Plan of Treatment





                                        Date/Time



                 Name            Type            Priority        Associated Diag

noses 

 

                                        2021  1:27 AM CST



                     [facility] outside images  External Films      Routine  



                                         for PACS    



documented as of this encounter



Procedures





                                        Comments



                 Procedure Name  Priority        Date/Time       Associated Diag

nosis 

 

                                         



                     [facility] OUTSIDE IMAGES  Routine             2021  



                           FOR PACS                  1:27 AM CST  



documented in this encounter



Visit Diagnoses

Not on filedocumented in this encounter



Care Teams





                          Start Date                End Date



                     Team Member         Relationship        Specialty  

 

                          21                   



                     Henrik Allison MD       PCP - General       Pediatrics  



                                         [address] [place] [id number]    



                                         [phone] (Work)    



                                         [phone] (Fax)    



documented as of this encounter Patient here for flu  vaccine.  VIS provided and reviewed.  Patient denies contraindications and consents to its administration.  Administered w/o adverse events.  See immunization list for additional information.   Patient left exam room in good condition.